# Patient Record
Sex: FEMALE | Race: WHITE | Employment: OTHER | URBAN - NONMETROPOLITAN AREA
[De-identification: names, ages, dates, MRNs, and addresses within clinical notes are randomized per-mention and may not be internally consistent; named-entity substitution may affect disease eponyms.]

---

## 2017-01-30 LAB
T4 FREE: 1.1 NG/DL (ref 0.8–1.8)
TSH SERPL DL<=0.05 MIU/L-ACNC: 0.48 UIU/ML (ref 0.4–4.4)

## 2017-03-02 DIAGNOSIS — E89.0 POSTOPERATIVE HYPOTHYROIDISM: ICD-10-CM

## 2017-03-03 RX ORDER — LEVOTHYROXINE SODIUM 0.05 MG/1
TABLET ORAL
Qty: 90 TABLET | Refills: 1 | Status: SHIPPED | OUTPATIENT
Start: 2017-03-03 | End: 2017-09-05 | Stop reason: SDUPTHER

## 2017-07-17 ENCOUNTER — HOSPITAL ENCOUNTER (OUTPATIENT)
Dept: INTERVENTIONAL RADIOLOGY/VASCULAR | Age: 62
Discharge: HOME | End: 2017-07-17
Payer: COMMERCIAL

## 2017-07-17 DIAGNOSIS — R60.9 SWELLING: ICD-10-CM

## 2017-07-17 PROCEDURE — 93971 EXTREMITY STUDY: CPT

## 2017-08-25 LAB
T4 FREE: 1.01 NG/DL (ref 0.8–1.8)
TSH SERPL DL<=0.05 MIU/L-ACNC: 0.88 UIU/ML (ref 0.4–4.4)

## 2017-09-05 ENCOUNTER — OFFICE VISIT (OUTPATIENT)
Dept: ENDOCRINOLOGY | Age: 62
End: 2017-09-05
Payer: COMMERCIAL

## 2017-09-05 VITALS
HEIGHT: 67 IN | WEIGHT: 189.5 LBS | HEART RATE: 110 BPM | DIASTOLIC BLOOD PRESSURE: 64 MMHG | RESPIRATION RATE: 20 BRPM | SYSTOLIC BLOOD PRESSURE: 118 MMHG | BODY MASS INDEX: 29.74 KG/M2

## 2017-09-05 DIAGNOSIS — E89.0 POSTOPERATIVE HYPOTHYROIDISM: Primary | ICD-10-CM

## 2017-09-05 DIAGNOSIS — E04.2 MULTINODULAR GOITER: ICD-10-CM

## 2017-09-05 PROCEDURE — 99213 OFFICE O/P EST LOW 20 MIN: CPT | Performed by: INTERNAL MEDICINE

## 2017-09-05 RX ORDER — LEVOTHYROXINE SODIUM 0.05 MG/1
TABLET ORAL
Qty: 90 TABLET | Refills: 1 | Status: SHIPPED | OUTPATIENT
Start: 2017-09-05 | End: 2018-03-09 | Stop reason: SDUPTHER

## 2018-02-03 ENCOUNTER — HOSPITAL ENCOUNTER (OUTPATIENT)
Age: 63
Discharge: HOME | End: 2018-02-03
Payer: COMMERCIAL

## 2018-02-03 LAB
ALBUMIN SERPL-MCNC: 4.6 G/DL (ref 3.5–5.1)
ALP BLD-CCNC: 83 U/L (ref 38–126)
ALT SERPL-CCNC: 9 U/L (ref 11–66)
ANION GAP SERPL CALCULATED.3IONS-SCNC: 11 MEQ/L (ref 8–16)
AST SERPL-CCNC: 10 U/L (ref 5–40)
BILIRUB SERPL-MCNC: 0.4 MG/DL (ref 0.3–1.2)
BILIRUBIN DIRECT: < 0.2 MG/DL (ref 0–0.3)
BUN BLDV-MCNC: 12 MG/DL (ref 7–22)
CALCIUM SERPL-MCNC: 9.8 MG/DL (ref 8.5–10.5)
CHLORIDE BLD-SCNC: 104 MEQ/L (ref 98–111)
CHOLESTEROL, TOTAL: 224 MG/DL (ref 100–199)
CO2: 28 MEQ/L (ref 23–33)
CREAT SERPL-MCNC: 0.6 MG/DL (ref 0.4–1.2)
GFR SERPL CREATININE-BSD FRML MDRD: > 90 ML/MIN/1.73M2
GLUCOSE BLD-MCNC: 105 MG/DL (ref 70–108)
HDLC SERPL-MCNC: 73 MG/DL
LDL CHOLESTEROL CALCULATED: 133 MG/DL
POTASSIUM SERPL-SCNC: 5.1 MEQ/L (ref 3.5–5.2)
SODIUM BLD-SCNC: 143 MEQ/L (ref 135–145)
TOTAL PROTEIN: 7 G/DL (ref 6.1–8)
TRIGL SERPL-MCNC: 89 MG/DL (ref 0–199)

## 2018-02-03 PROCEDURE — 80053 COMPREHEN METABOLIC PANEL: CPT

## 2018-02-03 PROCEDURE — 36415 COLL VENOUS BLD VENIPUNCTURE: CPT

## 2018-02-03 PROCEDURE — 82248 BILIRUBIN DIRECT: CPT

## 2018-02-03 PROCEDURE — 80061 LIPID PANEL: CPT

## 2018-03-09 DIAGNOSIS — E89.0 POSTOPERATIVE HYPOTHYROIDISM: ICD-10-CM

## 2018-03-09 RX ORDER — LEVOTHYROXINE SODIUM 0.05 MG/1
TABLET ORAL
Qty: 90 TABLET | Refills: 1 | Status: SHIPPED | OUTPATIENT
Start: 2018-03-09 | End: 2018-09-12 | Stop reason: SDUPTHER

## 2018-08-01 ENCOUNTER — HOSPITAL ENCOUNTER (OUTPATIENT)
Age: 63
Discharge: HOME | End: 2018-08-01
Payer: COMMERCIAL

## 2018-08-01 LAB
CREAT SERPL-MCNC: 0.9 MG/DL (ref 0.4–1.2)
EKG ATRIAL RATE: 74 BPM
EKG P AXIS: 2 DEGREES
EKG P-R INTERVAL: 120 MS
EKG Q-T INTERVAL: 404 MS
EKG QRS DURATION: 80 MS
EKG QTC CALCULATION (BAZETT): 448 MS
EKG R AXIS: 40 DEGREES
EKG T AXIS: 85 DEGREES
EKG VENTRICULAR RATE: 74 BPM
GFR SERPL CREATININE-BSD FRML MDRD: 64 ML/MIN/1.73M2
POTASSIUM SERPL-SCNC: 4.2 MEQ/L (ref 3.5–5.2)

## 2018-08-01 PROCEDURE — 93010 ELECTROCARDIOGRAM REPORT: CPT | Performed by: INTERNAL MEDICINE

## 2018-08-01 PROCEDURE — 84132 ASSAY OF SERUM POTASSIUM: CPT

## 2018-08-01 PROCEDURE — 82565 ASSAY OF CREATININE: CPT

## 2018-08-01 PROCEDURE — 93005 ELECTROCARDIOGRAM TRACING: CPT | Performed by: ORTHOPAEDIC SURGERY

## 2018-08-01 PROCEDURE — 36415 COLL VENOUS BLD VENIPUNCTURE: CPT

## 2018-08-31 ENCOUNTER — OFFICE VISIT (OUTPATIENT)
Dept: INTERNAL MEDICINE CLINIC | Age: 63
End: 2018-08-31
Payer: COMMERCIAL

## 2018-08-31 VITALS
SYSTOLIC BLOOD PRESSURE: 124 MMHG | RESPIRATION RATE: 14 BRPM | DIASTOLIC BLOOD PRESSURE: 82 MMHG | BODY MASS INDEX: 31.58 KG/M2 | HEART RATE: 80 BPM | WEIGHT: 201.2 LBS

## 2018-08-31 DIAGNOSIS — E04.2 MULTIPLE THYROID NODULES: ICD-10-CM

## 2018-08-31 DIAGNOSIS — E89.0 STATUS POST PARTIAL THYROIDECTOMY: ICD-10-CM

## 2018-08-31 DIAGNOSIS — E89.0 POSTOPERATIVE HYPOTHYROIDISM: Primary | ICD-10-CM

## 2018-08-31 PROCEDURE — 99204 OFFICE O/P NEW MOD 45 MIN: CPT | Performed by: INTERNAL MEDICINE

## 2018-08-31 RX ORDER — IBUPROFEN 800 MG/1
800 TABLET ORAL EVERY 6 HOURS PRN
COMMUNITY
End: 2019-08-22 | Stop reason: SDUPTHER

## 2018-08-31 RX ORDER — POTASSIUM CHLORIDE 1.5 G/1.77G
20 POWDER, FOR SOLUTION ORAL PRN
COMMUNITY
End: 2021-05-11

## 2018-08-31 RX ORDER — GABAPENTIN 100 MG/1
100 CAPSULE ORAL DAILY
COMMUNITY
End: 2021-05-11

## 2018-08-31 RX ORDER — FUROSEMIDE 40 MG/1
40 TABLET ORAL PRN
COMMUNITY
End: 2021-05-11

## 2018-08-31 ASSESSMENT — PATIENT HEALTH QUESTIONNAIRE - PHQ9
SUM OF ALL RESPONSES TO PHQ9 QUESTIONS 1 & 2: 0
2. FEELING DOWN, DEPRESSED OR HOPELESS: 0
SUM OF ALL RESPONSES TO PHQ QUESTIONS 1-9: 0
SUM OF ALL RESPONSES TO PHQ QUESTIONS 1-9: 0
1. LITTLE INTEREST OR PLEASURE IN DOING THINGS: 0

## 2018-08-31 NOTE — PROGRESS NOTES
[facility] PROFESSIONAL SERVS  PHYSICIANS [address], [address]  Dept: [phone]  Dept Fax: [phone]      Chief Complaint   Patient presents with    Hypothyroidism    Results       Patient presents for evaluation of postoperative hypothroidism. I have never seen the patient before. This patient is followed regularly by Dr. Lia Samaniego. Patient used to see Dr. Osiel Bradshaw. She had left thyroid lobectomy 2011 for thyroid nodules. She currently takes 50 µg of Synthroid daily. She said she feels good. She denies cold intolerance, weight gain, fatigue or constipation  Past Medical History:   Diagnosis Date    Hypothyroidism 9/9/2013       Current Outpatient Prescriptions   Medication Sig Dispense Refill    ibuprofen (ADVIL;MOTRIN) 800 MG tablet Take 800 mg by mouth every 6 hours as needed for Pain      gabapentin (NEURONTIN) 100 MG capsule Take 100 mg by mouth daily. Orval Opitz furosemide (LASIX) 40 MG tablet Take 40 mg by mouth as needed      potassium chloride (KLOR-CON) 20 MEQ packet Take 20 mEq by mouth as needed Along with furosemide      levothyroxine (SYNTHROID) 50 MCG tablet Take one tablet daily 90 tablet 1    escitalopram (LEXAPRO) 10 MG tablet Take 10 mg by mouth daily. No current facility-administered medications for this visit. Past Surgical History:   Procedure Laterality Date    ANKLE ARTHROSCOPY Left 05/15/2017    HYSTERECTOMY      THYROID SURGERY  1/5/2011    TONSILLECTOMY         Allergies   Allergen Reactions    Bee Venom Anaphylaxis    Tetracyclines & Related Rash    Zithromax [Azithromycin Dihydrate] Rash    Cefprozil     Doxycycline Diarrhea    Effexor [Venlafaxine] Diarrhea       Social History     Social History    Marital status:      Spouse name: N/A    Number of children: N/A    Years of education: N/A     Occupational History    Not on file.      Social History Main Topics    Smoking status: Never Smoker    Smokeless tobacco: Never Used    Alcohol use 0.0 oz/week      Comment: rarely    Drug use: No    Sexual activity: Not on file     Other Topics Concern    Not on file     Social History Narrative    No narrative on file   2 children   at detention    Family History   Problem Relation Age of Onset    Arthritis Mother     High Blood Pressure Mother     Birth Defects Father     Birth Defects Brother     Diabetes Maternal Aunt        Review of Systems - General ROS: negative  Psychological ROS: negative  Hematological and Lymphatic ROS: negative  Respiratory ROS: no cough, shortness of breath, or wheezing  Cardiovascular ROS: no chest pain or dyspnea on exertion  Gastrointestinal ROS: no abdominal pain, change in bowel habits, or black or bloody stools  Genito-Urinary ROS: no dysuria, trouble voiding, or hematuria  Musculoskeletal ROS: negative  Neurological ROS: no TIA or stroke symptoms  Dermatological ROS: negative    Blood pressure 124/82, pulse 80, resp. rate 14, weight 201 lb 3.2 oz (91.3 kg), not currently breastfeeding. Physical Examination: General appearance - alert, well appearing, and in no distress  Mental status - alert, oriented to person, place, and time  Neck - supple, no significant adenopathy, thyroid exam: thyroid is normal in size without nodules or tenderness  Chest - clear to auscultation, no wheezes, rales or rhonchi, symmetric air entry  Heart - normal rate, regular rhythm, normal S1, S2, no murmurs, rubs, clicks or gallops  Abdomen - soft, nontender, nondistended, no masses or organomegaly  Neurological - alert, oriented, normal speech, no focal findings or movement disorder noted  Musculoskeletal - no joint tenderness, deformity or swelling  Extremities - peripheral pulses normal, no pedal edema, no clubbing or cyanosis  Skin - normal coloration and turgor, no rashes, no suspicious skin lesions noted     Thyroid ultrasound (8-16): Impression       1.  Stable appearing right thyroid

## 2018-09-04 ENCOUNTER — HOSPITAL ENCOUNTER (OUTPATIENT)
Dept: ULTRASOUND IMAGING | Age: 63
Discharge: HOME | End: 2018-09-04
Payer: COMMERCIAL

## 2018-09-04 DIAGNOSIS — E04.2 MULTIPLE THYROID NODULES: ICD-10-CM

## 2018-09-04 PROCEDURE — 76536 US EXAM OF HEAD AND NECK: CPT

## 2018-09-12 DIAGNOSIS — E89.0 POSTOPERATIVE HYPOTHYROIDISM: ICD-10-CM

## 2018-09-14 ENCOUNTER — HOSPITAL ENCOUNTER (OUTPATIENT)
Dept: WOMENS IMAGING | Age: 63
Discharge: HOME | End: 2018-09-14
Payer: COMMERCIAL

## 2018-09-14 DIAGNOSIS — Z12.31 VISIT FOR SCREENING MAMMOGRAM: ICD-10-CM

## 2018-09-14 PROCEDURE — 77067 SCR MAMMO BI INCL CAD: CPT

## 2018-09-15 RX ORDER — LEVOTHYROXINE SODIUM 0.05 MG/1
TABLET ORAL
Qty: 30 TABLET | Refills: 5 | OUTPATIENT
Start: 2018-09-15 | End: 2019-02-05 | Stop reason: SDUPTHER

## 2019-01-26 ENCOUNTER — HOSPITAL ENCOUNTER (OUTPATIENT)
Age: 64
Discharge: HOME | End: 2019-01-26
Payer: COMMERCIAL

## 2019-01-26 LAB
ALBUMIN SERPL-MCNC: 4.7 G/DL (ref 3.5–5.1)
ALP BLD-CCNC: 92 U/L (ref 38–126)
ALT SERPL-CCNC: 10 U/L (ref 11–66)
ANION GAP SERPL CALCULATED.3IONS-SCNC: 12 MEQ/L (ref 8–16)
AST SERPL-CCNC: 11 U/L (ref 5–40)
BASOPHILS # BLD: 0.6 %
BASOPHILS ABSOLUTE: 0 THOU/MM3 (ref 0–0.1)
BILIRUB SERPL-MCNC: 0.4 MG/DL (ref 0.3–1.2)
BILIRUBIN DIRECT: < 0.2 MG/DL (ref 0–0.3)
BUN BLDV-MCNC: 16 MG/DL (ref 7–22)
CALCIUM SERPL-MCNC: 9.5 MG/DL (ref 8.5–10.5)
CHLORIDE BLD-SCNC: 110 MEQ/L (ref 98–111)
CHOLESTEROL, TOTAL: 212 MG/DL (ref 100–199)
CO2: 23 MEQ/L (ref 23–33)
CREAT SERPL-MCNC: 0.6 MG/DL (ref 0.4–1.2)
EOSINOPHIL # BLD: 2 %
EOSINOPHILS ABSOLUTE: 0.1 THOU/MM3 (ref 0–0.4)
ERYTHROCYTE [DISTWIDTH] IN BLOOD BY AUTOMATED COUNT: 13.2 % (ref 11.5–14.5)
ERYTHROCYTE [DISTWIDTH] IN BLOOD BY AUTOMATED COUNT: 44.1 FL (ref 35–45)
GFR SERPL CREATININE-BSD FRML MDRD: > 90 ML/MIN/1.73M2
GLUCOSE BLD-MCNC: 108 MG/DL (ref 70–108)
HCT VFR BLD CALC: 42.9 % (ref 37–47)
HDLC SERPL-MCNC: 63 MG/DL
HEMOGLOBIN: 13.1 GM/DL (ref 12–16)
IMMATURE GRANS (ABS): 0.01 THOU/MM3 (ref 0–0.07)
IMMATURE GRANULOCYTES: 0.2 %
LDL CHOLESTEROL CALCULATED: 132 MG/DL
LYMPHOCYTES # BLD: 22.8 %
LYMPHOCYTES ABSOLUTE: 1.1 THOU/MM3 (ref 1–4.8)
MCH RBC QN AUTO: 27.8 PG (ref 26–33)
MCHC RBC AUTO-ENTMCNC: 30.5 GM/DL (ref 32.2–35.5)
MCV RBC AUTO: 91.1 FL (ref 81–99)
MONOCYTES # BLD: 7 %
MONOCYTES ABSOLUTE: 0.4 THOU/MM3 (ref 0.4–1.3)
NUCLEATED RED BLOOD CELLS: 0 /100 WBC
PLATELET # BLD: 211 THOU/MM3 (ref 130–400)
PMV BLD AUTO: 11.2 FL (ref 9.4–12.4)
POTASSIUM SERPL-SCNC: 4.8 MEQ/L (ref 3.5–5.2)
RBC # BLD: 4.71 MILL/MM3 (ref 4.2–5.4)
SEG NEUTROPHILS: 67.4 %
SEGMENTED NEUTROPHILS ABSOLUTE COUNT: 3.4 THOU/MM3 (ref 1.8–7.7)
SODIUM BLD-SCNC: 145 MEQ/L (ref 135–145)
TOTAL PROTEIN: 6.7 G/DL (ref 6.1–8)
TRIGL SERPL-MCNC: 84 MG/DL (ref 0–199)
TSH SERPL DL<=0.05 MIU/L-ACNC: 0.71 UIU/ML (ref 0.4–4.2)
WBC # BLD: 5 THOU/MM3 (ref 4.8–10.8)

## 2019-01-26 PROCEDURE — 80061 LIPID PANEL: CPT

## 2019-01-26 PROCEDURE — 84443 ASSAY THYROID STIM HORMONE: CPT

## 2019-01-26 PROCEDURE — 80053 COMPREHEN METABOLIC PANEL: CPT

## 2019-01-26 PROCEDURE — 36415 COLL VENOUS BLD VENIPUNCTURE: CPT

## 2019-01-26 PROCEDURE — 85025 COMPLETE CBC W/AUTO DIFF WBC: CPT

## 2019-01-26 PROCEDURE — 82248 BILIRUBIN DIRECT: CPT

## 2019-02-05 DIAGNOSIS — E89.0 POSTOPERATIVE HYPOTHYROIDISM: ICD-10-CM

## 2019-02-07 RX ORDER — LEVOTHYROXINE SODIUM 0.05 MG/1
TABLET ORAL
Qty: 90 TABLET | Refills: 3 | Status: SHIPPED | OUTPATIENT
Start: 2019-02-07 | End: 2022-02-14

## 2019-08-22 ENCOUNTER — APPOINTMENT (OUTPATIENT)
Dept: GENERAL RADIOLOGY | Age: 64
End: 2019-08-22
Payer: COMMERCIAL

## 2019-08-22 ENCOUNTER — HOSPITAL ENCOUNTER (EMERGENCY)
Age: 64
Discharge: HOME | End: 2019-08-22
Payer: COMMERCIAL

## 2019-08-22 VITALS
SYSTOLIC BLOOD PRESSURE: 185 MMHG | WEIGHT: 195 LBS | OXYGEN SATURATION: 98 % | TEMPERATURE: 98.2 F | HEART RATE: 83 BPM | BODY MASS INDEX: 30.61 KG/M2 | RESPIRATION RATE: 17 BRPM | DIASTOLIC BLOOD PRESSURE: 90 MMHG

## 2019-08-22 DIAGNOSIS — S46.912A STRAIN OF LEFT SHOULDER, INITIAL ENCOUNTER: Primary | ICD-10-CM

## 2019-08-22 DIAGNOSIS — R03.0 ELEVATED BLOOD PRESSURE READING: ICD-10-CM

## 2019-08-22 PROCEDURE — 2709999900 HC NON-CHARGEABLE SUPPLY

## 2019-08-22 PROCEDURE — 99283 EMERGENCY DEPT VISIT LOW MDM: CPT

## 2019-08-22 PROCEDURE — 73030 X-RAY EXAM OF SHOULDER: CPT

## 2019-08-22 RX ORDER — IBUPROFEN 800 MG/1
800 TABLET ORAL EVERY 6 HOURS PRN
Qty: 30 TABLET | Refills: 0 | Status: SHIPPED | OUTPATIENT
Start: 2019-08-22 | End: 2019-09-05 | Stop reason: ALTCHOICE

## 2019-08-22 RX ORDER — CYCLOBENZAPRINE HCL 10 MG
10 TABLET ORAL 3 TIMES DAILY PRN
Qty: 21 TABLET | Refills: 0 | Status: SHIPPED | OUTPATIENT
Start: 2019-08-22 | End: 2019-09-01

## 2019-08-22 ASSESSMENT — PAIN DESCRIPTION - ORIENTATION: ORIENTATION: LEFT

## 2019-08-22 ASSESSMENT — ENCOUNTER SYMPTOMS
COUGH: 0
SHORTNESS OF BREATH: 0
ABDOMINAL PAIN: 0

## 2019-08-22 ASSESSMENT — PAIN SCALES - GENERAL: PAINLEVEL_OUTOF10: 3

## 2019-08-22 ASSESSMENT — PAIN DESCRIPTION - PAIN TYPE: TYPE: ACUTE PAIN

## 2019-08-22 ASSESSMENT — PAIN DESCRIPTION - DESCRIPTORS: DESCRIPTORS: ACHING

## 2019-08-22 ASSESSMENT — PAIN DESCRIPTION - LOCATION: LOCATION: SHOULDER

## 2019-08-22 NOTE — ED PROVIDER NOTES
[facility]    CONSULTS:  None    PROCEDURES:  None    FINAL IMPRESSION      1. Strain of left shoulder, initial encounter    2. Elevated blood pressure reading          DISPOSITION/PLAN   Discharge    PATIENT REFERRED TO:  Juan Zhang MD  [address] [place] [id number]  [phone]          [address] [address]  [phone]  In 1 day  appointment at [address] [place] [address]:  Discharge Medication List as of 8/22/2019 12:26 PM      START taking these medications    Details   cyclobenzaprine (FLEXERIL) 10 MG tablet Take 1 tablet by mouth 3 times daily as needed for Muscle spasms, Disp-21 tablet, R-0Print             (Please note that portions of this note were completed with a voice recognition program.  Efforts were made to edit the dictations but occasionally words are mis-transcribed.)    The patient was given an opportunity to see the Emergency Attending. The patient voiced understanding that I was a Mid-LevelProvider and was in agreement with being seen independently by myself.           JANEY Manzanares CNP  08/22/19 9453

## 2019-09-04 ENCOUNTER — HOSPITAL ENCOUNTER (OUTPATIENT)
Dept: MRI IMAGING | Age: 64
Discharge: HOME | End: 2019-09-04
Payer: COMMERCIAL

## 2019-09-04 DIAGNOSIS — S46.012A ROTATOR CUFF STRAIN, LEFT, INITIAL ENCOUNTER: ICD-10-CM

## 2019-09-04 PROCEDURE — 73221 MRI JOINT UPR EXTREM W/O DYE: CPT

## 2019-09-05 ENCOUNTER — OFFICE VISIT (OUTPATIENT)
Dept: INTERNAL MEDICINE CLINIC | Age: 64
End: 2019-09-05
Payer: COMMERCIAL

## 2019-09-05 VITALS
DIASTOLIC BLOOD PRESSURE: 63 MMHG | WEIGHT: 195.5 LBS | SYSTOLIC BLOOD PRESSURE: 142 MMHG | HEART RATE: 89 BPM | RESPIRATION RATE: 14 BRPM | HEIGHT: 67 IN | BODY MASS INDEX: 30.69 KG/M2

## 2019-09-05 DIAGNOSIS — E89.0 POSTOPERATIVE HYPOTHYROIDISM: ICD-10-CM

## 2019-09-05 DIAGNOSIS — E04.2 MULTIPLE THYROID NODULES: ICD-10-CM

## 2019-09-05 DIAGNOSIS — E03.9 HYPOTHYROIDISM, UNSPECIFIED TYPE: Primary | ICD-10-CM

## 2019-09-05 DIAGNOSIS — E89.0 STATUS POST PARTIAL THYROIDECTOMY: ICD-10-CM

## 2019-09-05 PROCEDURE — 99214 OFFICE O/P EST MOD 30 MIN: CPT | Performed by: INTERNAL MEDICINE

## 2019-09-05 RX ORDER — METHYLPREDNISOLONE 4 MG/1
4 TABLET ORAL DAILY
COMMUNITY
End: 2021-05-11

## 2019-09-05 ASSESSMENT — PATIENT HEALTH QUESTIONNAIRE - PHQ9
2. FEELING DOWN, DEPRESSED OR HOPELESS: 0
SUM OF ALL RESPONSES TO PHQ QUESTIONS 1-9: 0
SUM OF ALL RESPONSES TO PHQ9 QUESTIONS 1 & 2: 0
SUM OF ALL RESPONSES TO PHQ QUESTIONS 1-9: 0
1. LITTLE INTEREST OR PLEASURE IN DOING THINGS: 0

## 2019-09-05 NOTE — PROGRESS NOTES
not currently breastfeeding. Physical Examination: General appearance - alert, well appearing, and in no distress  Mental status - alert, oriented to person, place, and time  Neck - supple, no significant adenopathy, thyroid exam: thyroid is normal in size without nodules or tenderness  Chest - clear to auscultation, no wheezes, rales or rhonchi, symmetric air entry  Heart - normal rate, regular rhythm, normal S1, S2, no murmurs, rubs, clicks or gallops  Abdomen - soft, nontender, nondistended, no masses or organomegaly  Neurological - alert, oriented, normal speech, no focal findings or movement disorder noted  Musculoskeletal - no joint tenderness, deformity or swelling  Extremities - peripheral pulses normal, no pedal edema, no clubbing or cyanosis  Skin - normal coloration and turgor, no rashes, no suspicious skin lesions noted     Thyroid ultrasound 9/2018     Impression   1. Left thyroidectomy. 2. There is no residual thyroid tissue or mass within the left thyroid bed. 3. There is stable right thyromegaly and a stable sonographic findings consistent with a multinodular goiter. 4. There are no pathologically enlarged lymph nodes adjacent to the left thyroid bed or the right lobe/isthmus.              Diagnosis Orders   1. Hypothyroidism, unspecified type  TSH With Reflex Ft4    US THYROID   2. Postoperative hypothyroidism     3. Multiple thyroid nodules     4. Status post partial thyroidectomy         Orders Placed This Encounter   Procedures    US THYROID     Standing Status:   Future     Standing Expiration Date:   9/5/2020    TSH With Reflex Ft4     Standing Status:   Future     Standing Expiration Date:   9/5/2020     Patient has postoperative hypothyroidism. TSH normal.  In January  She feels well. We'll schedule thyroid ultrasound to follow-up on the nodules on the right.   I told her I would see her on a yearly basis

## 2021-05-11 PROBLEM — F41.9 ANXIETY: Status: ACTIVE | Noted: 2021-05-11

## 2021-05-11 PROBLEM — F33.9 EPISODE OF RECURRENT MAJOR DEPRESSIVE DISORDER (HCC): Status: ACTIVE | Noted: 2021-05-11

## 2021-07-09 PROBLEM — G90.522: Status: ACTIVE | Noted: 2021-07-09

## 2022-01-14 ENCOUNTER — HOSPITAL ENCOUNTER (EMERGENCY)
Age: 67
Discharge: HOME | End: 2022-01-14
Payer: COMMERCIAL

## 2022-01-14 ENCOUNTER — APPOINTMENT (OUTPATIENT)
Dept: GENERAL RADIOLOGY | Age: 67
End: 2022-01-14
Payer: COMMERCIAL

## 2022-01-14 VITALS
DIASTOLIC BLOOD PRESSURE: 66 MMHG | OXYGEN SATURATION: 98 % | HEIGHT: 67 IN | HEART RATE: 84 BPM | RESPIRATION RATE: 19 BRPM | SYSTOLIC BLOOD PRESSURE: 138 MMHG | BODY MASS INDEX: 31.39 KG/M2 | WEIGHT: 200 LBS | TEMPERATURE: 97.9 F

## 2022-01-14 DIAGNOSIS — S90.30XA CONTUSION OF DORSUM OF FOOT: Primary | ICD-10-CM

## 2022-01-14 PROCEDURE — 99213 OFFICE O/P EST LOW 20 MIN: CPT | Performed by: NURSE PRACTITIONER

## 2022-01-14 PROCEDURE — 73630 X-RAY EXAM OF FOOT: CPT

## 2022-01-14 PROCEDURE — 99213 OFFICE O/P EST LOW 20 MIN: CPT

## 2022-01-14 ASSESSMENT — ENCOUNTER SYMPTOMS
SHORTNESS OF BREATH: 0
VOMITING: 0
NAUSEA: 0
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
## 2022-02-08 PROBLEM — M17.12 ARTHRITIS OF LEFT KNEE: Status: ACTIVE | Noted: 2022-02-08

## 2022-02-25 PROBLEM — M76.829 TIBIALIS TENDINITIS: Status: ACTIVE | Noted: 2022-02-25

## 2022-03-04 ENCOUNTER — HOSPITAL ENCOUNTER (OUTPATIENT)
Age: 67
Discharge: HOME | End: 2022-03-04
Payer: COMMERCIAL

## 2022-03-04 DIAGNOSIS — Z00.00 ENCOUNTER FOR WELL ADULT EXAM WITHOUT ABNORMAL FINDINGS: ICD-10-CM

## 2022-03-04 DIAGNOSIS — Z00.00 WELLNESS EXAMINATION: ICD-10-CM

## 2022-03-04 DIAGNOSIS — Z11.59 NEED FOR HEPATITIS C SCREENING TEST: ICD-10-CM

## 2022-03-04 DIAGNOSIS — I10 HYPERTENSION, UNSPECIFIED TYPE: ICD-10-CM

## 2022-03-04 DIAGNOSIS — E89.0 POSTOPERATIVE HYPOTHYROIDISM: ICD-10-CM

## 2022-03-04 LAB
ALBUMIN SERPL-MCNC: 4.3 G/DL (ref 3.5–5.1)
ALP BLD-CCNC: 94 U/L (ref 38–126)
ALT SERPL-CCNC: 12 U/L (ref 11–66)
ANION GAP SERPL CALCULATED.3IONS-SCNC: 9 MEQ/L (ref 8–16)
AST SERPL-CCNC: 16 U/L (ref 5–40)
BASOPHILS # BLD: 0.7 %
BASOPHILS ABSOLUTE: 0 THOU/MM3 (ref 0–0.1)
BILIRUB SERPL-MCNC: 0.3 MG/DL (ref 0.3–1.2)
BUN BLDV-MCNC: 19 MG/DL (ref 7–22)
CALCIUM SERPL-MCNC: 9.5 MG/DL (ref 8.5–10.5)
CHLORIDE BLD-SCNC: 106 MEQ/L (ref 98–111)
CHOLESTEROL, TOTAL: 202 MG/DL (ref 100–199)
CO2: 27 MEQ/L (ref 23–33)
CREAT SERPL-MCNC: 0.8 MG/DL (ref 0.4–1.2)
EOSINOPHIL # BLD: 2.3 %
EOSINOPHILS ABSOLUTE: 0.1 THOU/MM3 (ref 0–0.4)
ERYTHROCYTE [DISTWIDTH] IN BLOOD BY AUTOMATED COUNT: 13.6 % (ref 11.5–14.5)
ERYTHROCYTE [DISTWIDTH] IN BLOOD BY AUTOMATED COUNT: 42.7 FL (ref 35–45)
GFR SERPL CREATININE-BSD FRML MDRD: 72 ML/MIN/1.73M2
GLUCOSE BLD-MCNC: 91 MG/DL (ref 70–108)
HCT VFR BLD CALC: 33 % (ref 37–47)
HDLC SERPL-MCNC: 76 MG/DL
HEMOGLOBIN: 9.7 GM/DL (ref 12–16)
HEPATITIS C ANTIBODY: NEGATIVE
IMMATURE GRANS (ABS): 0.01 THOU/MM3 (ref 0–0.07)
IMMATURE GRANULOCYTES: 0.2 %
LDL CHOLESTEROL CALCULATED: 112 MG/DL
LYMPHOCYTES # BLD: 24.9 %
LYMPHOCYTES ABSOLUTE: 1.4 THOU/MM3 (ref 1–4.8)
MCH RBC QN AUTO: 25.3 PG (ref 26–33)
MCHC RBC AUTO-ENTMCNC: 29.4 GM/DL (ref 32.2–35.5)
MCV RBC AUTO: 85.9 FL (ref 81–99)
MONOCYTES # BLD: 7.9 %
MONOCYTES ABSOLUTE: 0.5 THOU/MM3 (ref 0.4–1.3)
NUCLEATED RED BLOOD CELLS: 0 /100 WBC
PLATELET # BLD: 273 THOU/MM3 (ref 130–400)
PMV BLD AUTO: 11.2 FL (ref 9.4–12.4)
POTASSIUM SERPL-SCNC: 5 MEQ/L (ref 3.5–5.2)
RBC # BLD: 3.84 MILL/MM3 (ref 4.2–5.4)
SEG NEUTROPHILS: 64 %
SEGMENTED NEUTROPHILS ABSOLUTE COUNT: 3.6 THOU/MM3 (ref 1.8–7.7)
SODIUM BLD-SCNC: 142 MEQ/L (ref 135–145)
TOTAL PROTEIN: 6.5 G/DL (ref 6.1–8)
TRIGL SERPL-MCNC: 72 MG/DL (ref 0–199)
TSH SERPL DL<=0.05 MIU/L-ACNC: 0.85 UIU/ML (ref 0.4–4.2)
WBC # BLD: 5.7 THOU/MM3 (ref 4.8–10.8)

## 2022-03-04 PROCEDURE — 84443 ASSAY THYROID STIM HORMONE: CPT

## 2022-03-04 PROCEDURE — 86803 HEPATITIS C AB TEST: CPT

## 2022-03-04 PROCEDURE — 85025 COMPLETE CBC W/AUTO DIFF WBC: CPT

## 2022-03-04 PROCEDURE — 36415 COLL VENOUS BLD VENIPUNCTURE: CPT

## 2022-03-04 PROCEDURE — 80061 LIPID PANEL: CPT

## 2022-03-04 PROCEDURE — 80053 COMPREHEN METABOLIC PANEL: CPT

## 2022-03-08 ENCOUNTER — HOSPITAL ENCOUNTER (OUTPATIENT)
Age: 67
Discharge: HOME | End: 2022-03-08
Payer: COMMERCIAL

## 2022-03-08 DIAGNOSIS — D64.9 ANEMIA, UNSPECIFIED TYPE: ICD-10-CM

## 2022-03-08 PROCEDURE — 36415 COLL VENOUS BLD VENIPUNCTURE: CPT

## 2022-03-08 PROCEDURE — 83550 IRON BINDING TEST: CPT

## 2022-03-08 PROCEDURE — 82746 ASSAY OF FOLIC ACID SERUM: CPT

## 2022-03-08 PROCEDURE — 82607 VITAMIN B-12: CPT

## 2022-03-08 PROCEDURE — 83540 ASSAY OF IRON: CPT

## 2022-03-09 LAB
FOLATE: 10.7 NG/ML (ref 4.8–24.2)
IRON: 47 UG/DL (ref 50–170)
TOTAL IRON BINDING CAPACITY: 356 UG/DL (ref 171–450)
VITAMIN B-12: 571 PG/ML (ref 211–911)

## 2022-04-08 ENCOUNTER — HOSPITAL ENCOUNTER (OUTPATIENT)
Age: 67
Discharge: HOME | End: 2022-04-08
Payer: COMMERCIAL

## 2022-04-08 ENCOUNTER — HOSPITAL ENCOUNTER (OUTPATIENT)
Dept: GENERAL RADIOLOGY | Age: 67
Discharge: HOME | End: 2022-04-08
Payer: COMMERCIAL

## 2022-04-08 DIAGNOSIS — M79.671 RIGHT FOOT PAIN: ICD-10-CM

## 2022-04-08 LAB
ANION GAP SERPL CALCULATED.3IONS-SCNC: 9 MEQ/L (ref 8–16)
ANISOCYTOSIS: PRESENT
BASOPHILS # BLD: 0.7 %
BASOPHILS ABSOLUTE: 0 THOU/MM3 (ref 0–0.1)
BUN BLDV-MCNC: 24 MG/DL (ref 7–22)
C-REACTIVE PROTEIN: < 0.3 MG/DL (ref 0–1)
CALCIUM SERPL-MCNC: 9.6 MG/DL (ref 8.5–10.5)
CHLORIDE BLD-SCNC: 104 MEQ/L (ref 98–111)
CO2: 26 MEQ/L (ref 23–33)
CREAT SERPL-MCNC: 0.8 MG/DL (ref 0.4–1.2)
EKG ATRIAL RATE: 70 BPM
EKG P AXIS: 70 DEGREES
EKG P-R INTERVAL: 134 MS
EKG Q-T INTERVAL: 406 MS
EKG QRS DURATION: 82 MS
EKG QTC CALCULATION (BAZETT): 438 MS
EKG R AXIS: 64 DEGREES
EKG T AXIS: 78 DEGREES
EKG VENTRICULAR RATE: 70 BPM
EOSINOPHIL # BLD: 1.7 %
EOSINOPHILS ABSOLUTE: 0.1 THOU/MM3 (ref 0–0.4)
ERYTHROCYTE [DISTWIDTH] IN BLOOD BY AUTOMATED COUNT: 14 % (ref 11.5–14.5)
ERYTHROCYTE [DISTWIDTH] IN BLOOD BY AUTOMATED COUNT: 42 FL (ref 35–45)
GFR SERPL CREATININE-BSD FRML MDRD: 72 ML/MIN/1.73M2
GLUCOSE BLD-MCNC: 99 MG/DL (ref 70–108)
HCT VFR BLD CALC: 29.6 % (ref 37–47)
HEMOGLOBIN: 8.4 GM/DL (ref 12–16)
HYPOCHROMIA: PRESENT
IMMATURE GRANS (ABS): 0.02 THOU/MM3 (ref 0–0.07)
IMMATURE GRANULOCYTES: 0.4 %
LYMPHOCYTES # BLD: 17.5 %
LYMPHOCYTES ABSOLUTE: 0.9 THOU/MM3 (ref 1–4.8)
MCH RBC QN AUTO: 23.2 PG (ref 26–33)
MCHC RBC AUTO-ENTMCNC: 28.4 GM/DL (ref 32.2–35.5)
MCV RBC AUTO: 81.8 FL (ref 81–99)
MONOCYTES # BLD: 7.3 %
MONOCYTES ABSOLUTE: 0.4 THOU/MM3 (ref 0.4–1.3)
MRSA SCREEN RT-PCR: NEGATIVE
NUCLEATED RED BLOOD CELLS: 0 /100 WBC
PLATELET # BLD: 252 THOU/MM3 (ref 130–400)
PMV BLD AUTO: 10.7 FL (ref 9.4–12.4)
POTASSIUM SERPL-SCNC: 4.6 MEQ/L (ref 3.5–5.2)
RBC # BLD: 3.62 MILL/MM3 (ref 4.2–5.4)
SCAN OF BLOOD SMEAR: NORMAL
SEDIMENTATION RATE, ERYTHROCYTE: 11 MM/HR (ref 0–20)
SEG NEUTROPHILS: 72.4 %
SEGMENTED NEUTROPHILS ABSOLUTE COUNT: 3.9 THOU/MM3 (ref 1.8–7.7)
SODIUM BLD-SCNC: 139 MEQ/L (ref 135–145)
WBC # BLD: 5.4 THOU/MM3 (ref 4.8–10.8)

## 2022-04-08 PROCEDURE — 85651 RBC SED RATE NONAUTOMATED: CPT

## 2022-04-08 PROCEDURE — 71046 X-RAY EXAM CHEST 2 VIEWS: CPT

## 2022-04-08 PROCEDURE — 93010 ELECTROCARDIOGRAM REPORT: CPT | Performed by: INTERNAL MEDICINE

## 2022-04-08 PROCEDURE — 87641 MR-STAPH DNA AMP PROBE: CPT

## 2022-04-08 PROCEDURE — 36415 COLL VENOUS BLD VENIPUNCTURE: CPT

## 2022-04-08 PROCEDURE — 86140 C-REACTIVE PROTEIN: CPT

## 2022-04-08 PROCEDURE — 99211 OFF/OP EST MAY X REQ PHY/QHP: CPT

## 2022-04-08 PROCEDURE — 93005 ELECTROCARDIOGRAM TRACING: CPT | Performed by: ORTHOPAEDIC SURGERY

## 2022-04-08 PROCEDURE — 85025 COMPLETE CBC W/AUTO DIFF WBC: CPT

## 2022-04-08 PROCEDURE — 80048 BASIC METABOLIC PNL TOTAL CA: CPT

## 2022-04-18 ENCOUNTER — HOSPITAL ENCOUNTER (OUTPATIENT)
Age: 67
Discharge: HOME | End: 2022-04-18
Payer: COMMERCIAL

## 2022-04-18 DIAGNOSIS — D64.9 ANEMIA, UNSPECIFIED TYPE: ICD-10-CM

## 2022-04-18 LAB
IRON: 28 UG/DL (ref 50–170)
TOTAL IRON BINDING CAPACITY: 384 UG/DL (ref 171–450)

## 2022-04-18 PROCEDURE — 83550 IRON BINDING TEST: CPT

## 2022-04-18 PROCEDURE — 36415 COLL VENOUS BLD VENIPUNCTURE: CPT

## 2022-04-18 PROCEDURE — 83540 ASSAY OF IRON: CPT

## 2022-05-11 ENCOUNTER — HOSPITAL ENCOUNTER (OUTPATIENT)
Age: 67
Setting detail: SPECIMEN
Discharge: HOME | End: 2022-05-11
Payer: COMMERCIAL

## 2022-05-11 LAB — HEMOCCULT STL QL: NEGATIVE

## 2022-05-11 PROCEDURE — 82272 OCCULT BLD FECES 1-3 TESTS: CPT

## 2022-06-28 ENCOUNTER — HOSPITAL ENCOUNTER (OUTPATIENT)
Age: 67
Discharge: HOME | End: 2022-06-28

## 2022-06-28 DIAGNOSIS — D64.9 ANEMIA, UNSPECIFIED TYPE: ICD-10-CM

## 2022-06-29 ENCOUNTER — HOSPITAL ENCOUNTER (OUTPATIENT)
Age: 67
Discharge: HOME | End: 2022-06-29
Payer: COMMERCIAL

## 2022-06-29 PROCEDURE — 83540 ASSAY OF IRON: CPT

## 2022-06-29 PROCEDURE — 36415 COLL VENOUS BLD VENIPUNCTURE: CPT

## 2022-06-29 PROCEDURE — 85025 COMPLETE CBC W/AUTO DIFF WBC: CPT

## 2022-06-29 PROCEDURE — 83550 IRON BINDING TEST: CPT

## 2022-06-30 ENCOUNTER — HOSPITAL ENCOUNTER (EMERGENCY)
Age: 67
Discharge: HOME | End: 2022-06-30
Payer: COMMERCIAL

## 2022-06-30 VITALS
HEART RATE: 70 BPM | RESPIRATION RATE: 16 BRPM | WEIGHT: 200 LBS | DIASTOLIC BLOOD PRESSURE: 56 MMHG | TEMPERATURE: 98.1 F | SYSTOLIC BLOOD PRESSURE: 102 MMHG | OXYGEN SATURATION: 100 % | BODY MASS INDEX: 31.39 KG/M2 | HEIGHT: 67 IN

## 2022-06-30 DIAGNOSIS — D50.9 IRON DEFICIENCY ANEMIA, UNSPECIFIED IRON DEFICIENCY ANEMIA TYPE: Primary | ICD-10-CM

## 2022-06-30 LAB
ABO: NORMAL
ALBUMIN SERPL-MCNC: 4.2 G/DL (ref 3.5–5.1)
ALP BLD-CCNC: 95 U/L (ref 38–126)
ALT SERPL-CCNC: 13 U/L (ref 11–66)
ANION GAP SERPL CALCULATED.3IONS-SCNC: 10 MEQ/L (ref 8–16)
ANTIBODY SCREEN: NORMAL
APTT: 27.7 SECONDS (ref 22–38)
AST SERPL-CCNC: 17 U/L (ref 5–40)
BASOPHILIA: ABNORMAL
BASOPHILS # BLD: 0.5 %
BASOPHILS # BLD: 0.8 %
BASOPHILS ABSOLUTE: 0 THOU/MM3 (ref 0–0.1)
BASOPHILS ABSOLUTE: 0 THOU/MM3 (ref 0–0.1)
BILIRUB SERPL-MCNC: 0.2 MG/DL (ref 0.3–1.2)
BUN BLDV-MCNC: 21 MG/DL (ref 7–22)
CALCIUM SERPL-MCNC: 9 MG/DL (ref 8.5–10.5)
CHLORIDE BLD-SCNC: 104 MEQ/L (ref 98–111)
CO2: 25 MEQ/L (ref 23–33)
CREAT SERPL-MCNC: 0.7 MG/DL (ref 0.4–1.2)
DIFFERENTIAL TYPE: ABNORMAL
EKG ATRIAL RATE: 73 BPM
EKG P AXIS: -4 DEGREES
EKG P-R INTERVAL: 128 MS
EKG Q-T INTERVAL: 424 MS
EKG QRS DURATION: 78 MS
EKG QTC CALCULATION (BAZETT): 467 MS
EKG R AXIS: 22 DEGREES
EKG T AXIS: 31 DEGREES
EKG VENTRICULAR RATE: 73 BPM
ELLIPTOCYTES: ABNORMAL
ELLIPTOCYTES: ABNORMAL
EOSINOPHIL # BLD: 2.1 %
EOSINOPHIL # BLD: 2.4 %
EOSINOPHILS ABSOLUTE: 0.1 THOU/MM3 (ref 0–0.4)
EOSINOPHILS ABSOLUTE: 0.1 THOU/MM3 (ref 0–0.4)
ERYTHROCYTE [DISTWIDTH] IN BLOOD BY AUTOMATED COUNT: 19.1 % (ref 11.5–14.5)
ERYTHROCYTE [DISTWIDTH] IN BLOOD BY AUTOMATED COUNT: 19.1 % (ref 11.5–14.5)
ERYTHROCYTE [DISTWIDTH] IN BLOOD BY AUTOMATED COUNT: 58.2 FL (ref 35–45)
ERYTHROCYTE [DISTWIDTH] IN BLOOD BY AUTOMATED COUNT: 58.8 FL (ref 35–45)
FOLATE: 13.8 NG/ML (ref 4.8–24.2)
GFR SERPL CREATININE-BSD FRML MDRD: 84 ML/MIN/1.73M2
GLUCOSE BLD-MCNC: 96 MG/DL (ref 70–108)
HCT VFR BLD CALC: 25.3 % (ref 37–47)
HCT VFR BLD CALC: 25.6 % (ref 37–47)
HCT VFR BLD CALC: 25.6 % (ref 37–47)
HEMOCCULT STL QL: NEGATIVE
HEMOGLOBIN: 7 GM/DL (ref 12–16)
HEMOGLOBIN: 7.2 GM/DL (ref 12–16)
HEMOGLOBIN: 7.2 GM/DL (ref 12–16)
HYPOCHROMIA: PRESENT
HYPOCHROMIA: PRESENT
IMMATURE GRANS (ABS): 0.01 THOU/MM3 (ref 0–0.07)
IMMATURE GRANS (ABS): 0.02 THOU/MM3 (ref 0–0.07)
IMMATURE GRANULOCYTES: 0.2 %
IMMATURE GRANULOCYTES: 0.5 %
INR BLD: 0.98 (ref 0.85–1.13)
IRON SATURATION: 6 % (ref 20–50)
IRON: 23 UG/DL (ref 50–170)
IRON: 34 UG/DL (ref 50–170)
LYMPHOCYTES # BLD: 23.6 %
LYMPHOCYTES # BLD: 26.5 %
LYMPHOCYTES ABSOLUTE: 1 THOU/MM3 (ref 1–4.8)
LYMPHOCYTES ABSOLUTE: 1.3 THOU/MM3 (ref 1–4.8)
MCH RBC QN AUTO: 23 PG (ref 26–33)
MCH RBC QN AUTO: 23.5 PG (ref 26–33)
MCHC RBC AUTO-ENTMCNC: 27.3 GM/DL (ref 32.2–35.5)
MCHC RBC AUTO-ENTMCNC: 28.1 GM/DL (ref 32.2–35.5)
MCV RBC AUTO: 83.4 FL (ref 81–99)
MCV RBC AUTO: 84.2 FL (ref 81–99)
MONOCYTES # BLD: 7.8 %
MONOCYTES # BLD: 8 %
MONOCYTES ABSOLUTE: 0.3 THOU/MM3 (ref 0.4–1.3)
MONOCYTES ABSOLUTE: 0.4 THOU/MM3 (ref 0.4–1.3)
NUCLEATED RED BLOOD CELLS: 0 /100 WBC
NUCLEATED RED BLOOD CELLS: 0 /100 WBC
OSMOLALITY CALCULATION: 280.4 MOSMOL/KG (ref 275–300)
PATHOLOGIST REVIEW: ABNORMAL
PLATELET # BLD: 246 THOU/MM3 (ref 130–400)
PLATELET # BLD: 259 THOU/MM3 (ref 130–400)
PLATELET ESTIMATE: ADEQUATE
PMV BLD AUTO: 10.6 FL (ref 9.4–12.4)
PMV BLD AUTO: 10.8 FL (ref 9.4–12.4)
POIKILOCYTES: ABNORMAL
POTASSIUM REFLEX MAGNESIUM: 4 MEQ/L (ref 3.5–5.2)
RBC # BLD: 3.04 MILL/MM3 (ref 4.2–5.4)
RBC # BLD: 3.07 MILL/MM3 (ref 4.2–5.4)
RH FACTOR: NORMAL
SCAN OF BLOOD SMEAR: NORMAL
SCAN OF BLOOD SMEAR: NORMAL
SEG NEUTROPHILS: 62.4 %
SEG NEUTROPHILS: 65.2 %
SEGMENTED NEUTROPHILS ABSOLUTE COUNT: 2.7 THOU/MM3 (ref 1.8–7.7)
SEGMENTED NEUTROPHILS ABSOLUTE COUNT: 3.1 THOU/MM3 (ref 1.8–7.7)
SODIUM BLD-SCNC: 139 MEQ/L (ref 135–145)
TOTAL IRON BINDING CAPACITY: 359 UG/DL (ref 171–450)
TOTAL IRON BINDING CAPACITY: 367 UG/DL (ref 171–450)
TOTAL PROTEIN: 6.2 G/DL (ref 6.1–8)
TROPONIN T: < 0.01 NG/ML
TSH SERPL DL<=0.05 MIU/L-ACNC: 1.27 UIU/ML (ref 0.4–4.2)
VITAMIN B-12: 524 PG/ML (ref 211–911)
WBC # BLD: 4.2 THOU/MM3 (ref 4.8–10.8)
WBC # BLD: 4.9 THOU/MM3 (ref 4.8–10.8)

## 2022-06-30 PROCEDURE — 85025 COMPLETE CBC W/AUTO DIFF WBC: CPT

## 2022-06-30 PROCEDURE — 84443 ASSAY THYROID STIM HORMONE: CPT

## 2022-06-30 PROCEDURE — 2580000003 HC RX 258: Performed by: PHYSICIAN ASSISTANT

## 2022-06-30 PROCEDURE — 83550 IRON BINDING TEST: CPT

## 2022-06-30 PROCEDURE — 80053 COMPREHEN METABOLIC PANEL: CPT

## 2022-06-30 PROCEDURE — 84484 ASSAY OF TROPONIN QUANT: CPT

## 2022-06-30 PROCEDURE — 83540 ASSAY OF IRON: CPT

## 2022-06-30 PROCEDURE — 99284 EMERGENCY DEPT VISIT MOD MDM: CPT

## 2022-06-30 PROCEDURE — 93005 ELECTROCARDIOGRAM TRACING: CPT | Performed by: PHYSICIAN ASSISTANT

## 2022-06-30 PROCEDURE — 85014 HEMATOCRIT: CPT

## 2022-06-30 PROCEDURE — 85610 PROTHROMBIN TIME: CPT

## 2022-06-30 PROCEDURE — 82272 OCCULT BLD FECES 1-3 TESTS: CPT

## 2022-06-30 PROCEDURE — 85018 HEMOGLOBIN: CPT

## 2022-06-30 PROCEDURE — 36415 COLL VENOUS BLD VENIPUNCTURE: CPT

## 2022-06-30 PROCEDURE — 86850 RBC ANTIBODY SCREEN: CPT

## 2022-06-30 PROCEDURE — 82607 VITAMIN B-12: CPT

## 2022-06-30 PROCEDURE — 6360000002 HC RX W HCPCS: Performed by: PHYSICIAN ASSISTANT

## 2022-06-30 PROCEDURE — 93010 ELECTROCARDIOGRAM REPORT: CPT | Performed by: INTERNAL MEDICINE

## 2022-06-30 PROCEDURE — 96365 THER/PROPH/DIAG IV INF INIT: CPT

## 2022-06-30 PROCEDURE — 82746 ASSAY OF FOLIC ACID SERUM: CPT

## 2022-06-30 PROCEDURE — 85730 THROMBOPLASTIN TIME PARTIAL: CPT

## 2022-06-30 PROCEDURE — 86901 BLOOD TYPING SEROLOGIC RH(D): CPT

## 2022-06-30 PROCEDURE — 86900 BLOOD TYPING SEROLOGIC ABO: CPT

## 2022-06-30 RX ADMIN — IRON SUCROSE 200 MG: 20 INJECTION, SOLUTION INTRAVENOUS at 17:40

## 2022-06-30 ASSESSMENT — PAIN - FUNCTIONAL ASSESSMENT
PAIN_FUNCTIONAL_ASSESSMENT: NONE - DENIES PAIN

## 2022-06-30 ASSESSMENT — ENCOUNTER SYMPTOMS: SHORTNESS OF BREATH: 1

## 2022-06-30 NOTE — ED PROVIDER NOTES
[facility]  eMERGENCY dEPARTMENT eNCOUnter          CHIEF COMPLAINT       Chief Complaint   Patient presents with    Abnormal Lab       Nurses Notes reviewed and I agree except as noted in the HPI. HISTORY OF PRESENT ILLNESS    Paul Merritt is a 61 y.o. female who presents to the Emergency Department for the evaluation of anemia. Patient states that in late May she had a hemoglobin of 8.4 and was started on iron supplement at that time. She had some repeat testing done through her PCP yesterday that revealed hemoglobin of 7. She denies any abnormal bleeding visible. States she has had some black bowel movements that started only after she began the iron supplement and she notes a previous negative Hemoccult last month. States she does not take any Pepto-Bismol. She denies any associated abdominal pain, vomiting, unexpected weight loss, dizziness, syncope, chest pain or palpitations. She does report some fatigue but attributes this to poor sleep due to increased stress due to some family issues as well as pending total knee replacement and recovery from a foot surgery in April. States she has some occasional shortness of breath and this is not new and she attributes it to her weight. Denies any NSAID use or coffee intake. States she drinks 1-2 sodas per day, has cut back to assist in weight loss and has also eliminated sweets from her diet with resultant 10 pound weight loss. States she has had colonoscopies, most recently 4 years ago and they have always been normal.  No family history of colon cancer. No anticoagulant use. No other complaints today. The HPI was provided by the patient. REVIEW OF SYSTEMS     Review of Systems   Constitutional: Positive for fatigue. Respiratory: Positive for shortness of breath (chronic, at baseline). Psychiatric/Behavioral: Positive for sleep disturbance. The patient is nervous/anxious.     All other systems reviewed and are negative. PAST MEDICAL HISTORY    has a past medical history of Arthritis of left knee, Asthma, and Hypothyroidism. SURGICAL HISTORY      has a past surgical history that includes Tonsillectomy; Hysterectomy; Thyroid surgery (1/5/2011); and Ankle arthroscopy (Left, 05/15/2017). CURRENT MEDICATIONS       Discharge Medication List as of 6/30/2022  5:08 PM      CONTINUE these medications which have NOT CHANGED    Details   ferrous sulfate (FE TABS) 325 (65 Fe) MG EC tablet Take 1 tablet by mouth in the morning, at noon, and at bedtime, Disp-90 tablet, R-3Normal      levothyroxine (SYNTHROID) 50 MCG tablet take 1 tablet by mouth once daily, Disp-30 tablet, R-11DUE FOR WELLNESSNormal      valACYclovir (VALTREX) 500 MG tablet Take 1 tablet by mouth daily, Disp-30 tablet, R-11DUE FOR WELLNESSNormal      valsartan-hydroCHLOROthiazide (DIOVAN-HCT) 160-12.5 MG per tablet Take 1 tablet by mouth daily, Disp-30 tablet, R-11Normal      citalopram (CELEXA) 40 MG tablet Take 1 tablet by mouth daily, Disp-30 tablet, R-11Normal             ALLERGIES     is allergic to bee venom, tetracyclines & related, zithromax [azithromycin dihydrate], doxycycline, effexor [venlafaxine], synthroid [levothyroxine], and hydrochlorothiazide. FAMILY HISTORY     She indicated that the status of her mother is unknown. She indicated that the status of her father is unknown. She indicated that the status of her brother is unknown. She indicated that the status of her maternal aunt is unknown.   family history includes Arthritis in her mother; Birth Defects in her brother and father; Diabetes in her maternal aunt; High Blood Pressure in her mother. SOCIAL HISTORY      reports that she has never smoked. She has never used smokeless tobacco. She reports current alcohol use. She reports that she does not use drugs. PHYSICAL EXAM     INITIAL VITALS:  height is 5' 7\" (1.702 m) and weight is 200 lb (90.7 kg).  Her oral temperature is 98.1 °F (36.7 °C). Her blood pressure is 102/56 (abnormal) and her pulse is 70. Her respiration is 16 and oxygen saturation is 100%. Physical Exam  Vitals and nursing note reviewed. Constitutional:       Appearance: Normal appearance. HENT:      Head: Normocephalic and atraumatic. Eyes:      Conjunctiva/sclera: Conjunctivae normal.   Cardiovascular:      Rate and Rhythm: Normal rate and regular rhythm. Heart sounds: Normal heart sounds. No murmur heard. Pulmonary:      Effort: Pulmonary effort is normal. No respiratory distress. Breath sounds: Normal breath sounds. No wheezing or rhonchi. Abdominal:      Palpations: Abdomen is soft. Tenderness: There is no abdominal tenderness. There is no guarding or rebound. Musculoskeletal:      Cervical back: Normal range of motion. Right lower leg: No edema. Left lower leg: No edema. Skin:     General: Skin is warm and dry. Neurological:      General: No focal deficit present. Mental Status: She is alert and oriented to person, place, and time. Psychiatric:         Mood and Affect: Mood normal.         DIFFERENTIAL DIAGNOSIS:   Differential diagnoses are discussed    DIAGNOSTIC RESULTS     EKG: All EKG's are interpreted by the Emergency Department Physician who either signs or Co-signsthis chart in the absence of a cardiologist.    Vent. Rate: 73 bpm  PRinterval: 128 ms  QRS duration: 78 ms  QTc: 467 ms  P-R-T axes: -4, 22, 31  NSR. No STEMI.   Compared to old EKG on 4-8-22      RADIOLOGY: non-plain film images(s) such as CT, Ultrasound and MRI are read by the radiologist.    No orders to display       LABS:      Labs Reviewed   CBC WITH AUTO DIFFERENTIAL - Abnormal; Notable for the following components:       Result Value    WBC 4.2 (*)     RBC 3.07 (*)     Hemoglobin 7.2 (*)     Hematocrit 25.6 (*)     MCH 23.5 (*)     MCHC 28.1 (*)     RDW-CV 19.1 (*)     RDW-SD 58.2 (*)     Monocytes Absolute 0.3 (*)     All other components within normal limits   COMPREHENSIVE METABOLIC PANEL W/ REFLEX TO MG FOR LOW K - Abnormal; Notable for the following components: Total Bilirubin 0.2 (*)     All other components within normal limits   IRON SATURATION - Abnormal; Notable for the following components:    Iron Saturation 6 (*)     All other components within normal limits   IRON - Abnormal; Notable for the following components:    Iron 23 (*)     All other components within normal limits   GLOMERULAR FILTRATION RATE, ESTIMATED - Abnormal; Notable for the following components:    Est, Glom Filt Rate 84 (*)     All other components within normal limits   HEMOGLOBIN AND HEMATOCRIT - Abnormal; Notable for the following components:    Hemoglobin 7.2 (*)     Hematocrit 25.3 (*)     All other components within normal limits   BLOOD OCCULT STOOL SCREEN #1   PROTIME-INR   APTT   TROPONIN   VITAMIN B12 & FOLATE   TSH WITH REFLEX   ANION GAP   OSMOLALITY   SCAN OF BLOOD SMEAR   IRON BINDING CAPACITY   TYPE AND SCREEN       EMERGENCY DEPARTMENT COURSE:   Vitals:    Vitals:    06/30/22 1253 06/30/22 1423 06/30/22 1540 06/30/22 1654   BP: 106/80 131/78 (!) 103/57 (!) 102/56   Pulse: 62 72 70 70   Resp: 16 16 16 16   Temp:       TempSrc:       SpO2: 100% 99% 99% 100%   Weight:       Height:          12:31 PM EDT: The patient was seen and evaluated. Patient presents with reassuring vital signs for some ongoing anemia which has been present since March and worsening since that time. Denies any visible bleeding and denies any dark stools prior to initiating iron supplementation. She has been on iron supplementation since May but noted to have hemoglobin 7.0 upon outpatient labs yesterday. She does not really have any associated symptoms with this. She does note some fatigue but attributes this to poor sleep secondary to anxiety appropriate for situational stressors. Additional Hemoccult today was negative.   Hemoglobin 7.2 in the department and this remained stable on 3-hour recheck. Iron saturation is 6, iron 23, downward trending in comparison to yesterday's labs. Total iron binding capacity was within normal limits. At this time, I do not feel CT imaging is likely to yield any diagnosis due to repeated negative Hemoccults, no unexpected weight loss, abdominal pain, stool changes, family history of colon cancer. She does have history of routine screenings for colonoscopy that have been without abnormality, most recent 4 years ago, currently on every 10-year follow-up schedule. Mother and aunt both had diagnosis of iron deficiency anemia in their mid 76s with mother reporting having had many iron transfusions as a result. I discussed the case with hematology who is agreeable with 200 mg of Venofer in the department and follow-up with their office in 2 weeks. Patient was agreeable with this as well. She supposed to have a tooth pulled in 1 week, will contact her PCP office tomorrow to discuss a recheck prior to the procedure though she denies any history of abnormal bleeding with prior procedures. Signs and symptoms of anemia were discussed with the patient and mother at bedside and she is to return should the emergency department for further evaluation should she notice any or have any visible bleeding. She will continue her oral iron supplementation and contact hematology office tomorrow for follow-up. She was agreeable and denied further needs upon discharge. She remained hemodynamically stable during her ED stay. CRITICAL CARE:   None    CONSULTS:  Dr. gO Delgado, hematology    PROCEDURES:  None    FINAL IMPRESSION      1.  Iron deficiency anemia, unspecified iron deficiency anemia type          DISPOSITION/PLAN   Discharge    PATIENT REFERRED TO:  JANEY Villegas - SAURABH Flores [facility]  [address] [facility]  [phone]    Call in 1 day  for hemoglobin monitoring    Miah Dao MD  [address] [place] [address]  [phone]    Call in 1 day  to schedule follow-up in 2 weeks    [facility] EMERGENCY DEPT  [address] [place] [address]  [phone]    If symptoms worsen      DISCHARGEMEDICATIONS:  Discharge Medication List as of 6/30/2022  5:08 PM          (Please note that portions of this note were completedwith a voice recognition program.  Efforts were made to edit the dictations but occasionally words are mis-transcribed.)        Travon Hickey PA-C  07/01/22 0012

## 2022-06-30 NOTE — ED NOTES
Pt resting in cot. Updated on POC. VSS. Denies any needs.  Will monitor      Bobby Hernandez RN  06/30/22 1837

## 2022-07-06 ENCOUNTER — HOSPITAL ENCOUNTER (OUTPATIENT)
Age: 67
Discharge: HOME | End: 2022-07-06
Payer: COMMERCIAL

## 2022-07-06 DIAGNOSIS — D64.9 ANEMIA, UNSPECIFIED TYPE: ICD-10-CM

## 2022-07-06 LAB
HCT VFR BLD CALC: 28 % (ref 37–47)
HEMOGLOBIN: 7.9 GM/DL (ref 12–16)

## 2022-07-06 PROCEDURE — 85014 HEMATOCRIT: CPT

## 2022-07-06 PROCEDURE — 85018 HEMOGLOBIN: CPT

## 2022-07-06 PROCEDURE — 36415 COLL VENOUS BLD VENIPUNCTURE: CPT

## 2022-07-13 ENCOUNTER — HOSPITAL ENCOUNTER (OUTPATIENT)
Dept: MAMMOGRAPHY | Age: 67
Discharge: HOME | End: 2022-07-13
Payer: COMMERCIAL

## 2022-07-13 DIAGNOSIS — Z12.31 ENCOUNTER FOR SCREENING MAMMOGRAM FOR BREAST CANCER: ICD-10-CM

## 2022-07-13 PROCEDURE — 77063 BREAST TOMOSYNTHESIS BI: CPT

## 2022-07-15 ENCOUNTER — HOSPITAL ENCOUNTER (OUTPATIENT)
Age: 67
Discharge: HOME | End: 2022-07-15
Payer: COMMERCIAL

## 2022-07-15 DIAGNOSIS — R79.89 ABNORMAL CBC: ICD-10-CM

## 2022-07-15 DIAGNOSIS — D50.9 IRON DEFICIENCY ANEMIA, UNSPECIFIED IRON DEFICIENCY ANEMIA TYPE: ICD-10-CM

## 2022-07-15 PROCEDURE — 36415 COLL VENOUS BLD VENIPUNCTURE: CPT

## 2022-07-15 PROCEDURE — 85025 COMPLETE CBC W/AUTO DIFF WBC: CPT

## 2022-07-16 LAB
BASOPHILS # BLD: 0.4 %
BASOPHILS ABSOLUTE: 0 THOU/MM3 (ref 0–0.1)
ELLIPTOCYTES: ABNORMAL
EOSINOPHIL # BLD: 1.2 %
EOSINOPHILS ABSOLUTE: 0.1 THOU/MM3 (ref 0–0.4)
ERYTHROCYTE [DISTWIDTH] IN BLOOD BY AUTOMATED COUNT: 19 % (ref 11.5–14.5)
ERYTHROCYTE [DISTWIDTH] IN BLOOD BY AUTOMATED COUNT: 56.5 FL (ref 35–45)
HCT VFR BLD CALC: 29.4 % (ref 37–47)
HEMOGLOBIN: 8.3 GM/DL (ref 12–16)
HYPOCHROMIA: PRESENT
IMMATURE GRANS (ABS): 0.01 THOU/MM3 (ref 0–0.07)
IMMATURE GRANULOCYTES: 0.2 %
LYMPHOCYTES # BLD: 28.1 %
LYMPHOCYTES ABSOLUTE: 1.3 THOU/MM3 (ref 1–4.8)
MCH RBC QN AUTO: 23.2 PG (ref 26–33)
MCHC RBC AUTO-ENTMCNC: 28.2 GM/DL (ref 32.2–35.5)
MCV RBC AUTO: 82.1 FL (ref 81–99)
MONOCYTES # BLD: 8.1 %
MONOCYTES ABSOLUTE: 0.4 THOU/MM3 (ref 0.4–1.3)
NUCLEATED RED BLOOD CELLS: 0 /100 WBC
PLATELET # BLD: 304 THOU/MM3 (ref 130–400)
PLATELET ESTIMATE: ADEQUATE
PMV BLD AUTO: 11.3 FL (ref 9.4–12.4)
POIKILOCYTES: ABNORMAL
RBC # BLD: 3.58 MILL/MM3 (ref 4.2–5.4)
SCAN OF BLOOD SMEAR: NORMAL
SEG NEUTROPHILS: 62 %
SEGMENTED NEUTROPHILS ABSOLUTE COUNT: 3 THOU/MM3 (ref 1.8–7.7)
WBC # BLD: 4.8 THOU/MM3 (ref 4.8–10.8)

## 2022-08-05 ENCOUNTER — HOSPITAL ENCOUNTER (OUTPATIENT)
Dept: INFUSION THERAPY | Age: 67
Discharge: HOME | End: 2022-08-05
Payer: COMMERCIAL

## 2022-08-05 VITALS
TEMPERATURE: 97.5 F | OXYGEN SATURATION: 99 % | DIASTOLIC BLOOD PRESSURE: 67 MMHG | RESPIRATION RATE: 16 BRPM | SYSTOLIC BLOOD PRESSURE: 134 MMHG | HEART RATE: 65 BPM

## 2022-08-05 DIAGNOSIS — D50.9 IRON DEFICIENCY ANEMIA, UNSPECIFIED IRON DEFICIENCY ANEMIA TYPE: Primary | ICD-10-CM

## 2022-08-05 PROBLEM — D64.9 ANEMIA: Status: ACTIVE | Noted: 2022-08-05

## 2022-08-05 PROCEDURE — P9016 RBC LEUKOCYTES REDUCED: HCPCS

## 2022-08-05 PROCEDURE — 36430 TRANSFUSION BLD/BLD COMPNT: CPT

## 2022-08-05 PROCEDURE — 2580000003 HC RX 258: Performed by: INTERNAL MEDICINE

## 2022-08-05 RX ORDER — SODIUM CHLORIDE 9 MG/ML
20 INJECTION, SOLUTION INTRAVENOUS CONTINUOUS
Status: DISCONTINUED | OUTPATIENT
Start: 2022-08-05 | End: 2022-08-06 | Stop reason: HOSPADM

## 2022-08-05 RX ORDER — SODIUM CHLORIDE 9 MG/ML
20 INJECTION, SOLUTION INTRAVENOUS CONTINUOUS
Status: CANCELLED | OUTPATIENT
Start: 2022-08-05

## 2022-08-05 RX ADMIN — SODIUM CHLORIDE 20 ML/HR: 9 INJECTION, SOLUTION INTRAVENOUS at 08:53

## 2022-08-05 NOTE — PROGRESS NOTES
Patient assessed for the following post blood transfusion    Dizziness   No  Lightheadedness  No      Acute nausea/vomiting No  Headache   No  Chest pain/pressure  No  Rash/itching   No  Shortness of breath  No    Patient kept for 20 minutes observation post blood transfusion    Patient tolerated blood transfusion without any complications. Last vital signs:   /67   Pulse 65   Temp 97.5 °F (36.4 °C) (Oral)   Resp 16 Comment: lungs clear  SpO2 99%         Patient instructed if experience any of the above symptoms following today's infusion,he/she is to notify MD immediately or go to the emergency department. Discharge instructions given to patient. Verbalizes understanding. Ambulated off unit per self with belongings.

## 2022-08-05 NOTE — DISCHARGE INSTRUCTIONS
Please contact your Oncologist if you have any questions regarding the  blood transfusion that you received today. Patient instructed if experience any of the symptoms following today's transfusion / to notify MD immediately or go to emergency department.     * dizziness/lightheadedness  *acute nausea/vomiting - not relieved with medication  *headache - not relieved from Tylenol/pain medication  *chest pain/pressure  *rash/itching  *shortness of breath        Drink fluids - 48oz fluids daily  Call if develop fever/ chills/ signs or symptoms of infection     Keep follow up appt  With Dr. Claudean Held

## 2022-08-05 NOTE — PLAN OF CARE
Problem: Discharge Planning  Goal: Discharge to home or other facility with appropriate resources  Outcome: Adequate for Discharge  Flowsheets (Taken 8/5/2022 6665)  Discharge to home or other facility with appropriate resources: Identify barriers to discharge with patient and caregiver  Note: Verbalize understanding of discharge instructions, follow up appointments, and when to call Physician. Problem: Chronic Conditions and Co-morbidities  Goal: Patient's chronic conditions and co-morbidity symptoms are monitored and maintained or improved  Outcome: Adequate for Discharge  Flowsheets (Taken 8/5/2022 8761)  Care Plan - Patient's Chronic Conditions and Co-Morbidity Symptoms are Monitored and Maintained or Improved: Monitor and assess patient's chronic conditions and comorbid symptoms for stability, deterioration, or improvement  Note: Patient educated blood product transfusion protocol:    Patient receiving 1 UNIT PRBC:      - Blood product transfusion information sheet given: questions answered and consent signed  - Take vital signs/ monitor lungs sound prior to transfusion  - Monitor patient for 15 minutes after transfusion started  - Take vital signs / monitor lungs sound in 15 minutes and post transfusion  - Assess IV site   - Monitor patient closely for potential transfusion reaction    Call MD if develop complications once discharged. Problem: Safety - Adult  Goal: Free from fall injury  Outcome: Adequate for Discharge  Flowsheets (Taken 8/5/2022 0042)  Free From Fall Injury: Instruct family/caregiver on patient safety  Note: No falls occurred with visit today. Care plan reviewed with patient. Patient verbalizes understanding of the plan of care and contributes to goal setting.

## 2022-08-28 ENCOUNTER — APPOINTMENT (OUTPATIENT)
Dept: CT IMAGING | Age: 67
End: 2022-08-28
Payer: COMMERCIAL

## 2022-08-28 ENCOUNTER — HOSPITAL ENCOUNTER (OUTPATIENT)
Age: 67
Setting detail: OBSERVATION
Discharge: HOME | End: 2022-08-29
Attending: EMERGENCY MEDICINE | Admitting: INTERNAL MEDICINE
Payer: COMMERCIAL

## 2022-08-28 ENCOUNTER — APPOINTMENT (OUTPATIENT)
Dept: GENERAL RADIOLOGY | Age: 67
End: 2022-08-28
Payer: COMMERCIAL

## 2022-08-28 DIAGNOSIS — R06.09 DYSPNEA ON EXERTION: Primary | ICD-10-CM

## 2022-08-28 DIAGNOSIS — R31.29 MICROSCOPIC HEMATURIA: ICD-10-CM

## 2022-08-28 DIAGNOSIS — R10.9 LEFT FLANK PAIN: ICD-10-CM

## 2022-08-28 PROBLEM — J18.9 CAP (COMMUNITY ACQUIRED PNEUMONIA): Status: ACTIVE | Noted: 2022-08-28

## 2022-08-28 PROBLEM — R06.00 DYSPNEA: Status: ACTIVE | Noted: 2022-08-28

## 2022-08-28 LAB
ABSOLUTE RETIC #: 53 THOU/MM3 (ref 20–115)
ALBUMIN SERPL-MCNC: 4.1 G/DL (ref 3.5–5.1)
ALBUMIN SERPL-MCNC: 4.2 G/DL (ref 3.5–5.1)
ALP BLD-CCNC: 90 U/L (ref 38–126)
ALP BLD-CCNC: 91 U/L (ref 38–126)
ALT SERPL-CCNC: 14 U/L (ref 11–66)
ALT SERPL-CCNC: 16 U/L (ref 11–66)
ANION GAP SERPL CALCULATED.3IONS-SCNC: 10 MEQ/L (ref 8–16)
ANISOCYTOSIS: PRESENT
AST SERPL-CCNC: 16 U/L (ref 5–40)
AST SERPL-CCNC: 17 U/L (ref 5–40)
BACTERIA: NORMAL
BASOPHILS # BLD: 0.6 %
BASOPHILS ABSOLUTE: 0 THOU/MM3 (ref 0–0.1)
BILIRUB SERPL-MCNC: 0.2 MG/DL (ref 0.3–1.2)
BILIRUB SERPL-MCNC: 0.3 MG/DL (ref 0.3–1.2)
BILIRUBIN DIRECT: < 0.2 MG/DL (ref 0–0.3)
BILIRUBIN URINE: NEGATIVE
BLOOD, URINE: NEGATIVE
BUN BLDV-MCNC: 14 MG/DL (ref 7–22)
CALCIUM SERPL-MCNC: 9.5 MG/DL (ref 8.5–10.5)
CASTS: NORMAL /LPF
CASTS: NORMAL /LPF
CHARACTER, URINE: CLEAR
CHLORIDE BLD-SCNC: 104 MEQ/L (ref 98–111)
CO2: 25 MEQ/L (ref 23–33)
COLOR: YELLOW
CREAT SERPL-MCNC: 0.7 MG/DL (ref 0.4–1.2)
CRYSTALS: NORMAL
EKG ATRIAL RATE: 79 BPM
EKG ATRIAL RATE: 92 BPM
EKG P AXIS: 62 DEGREES
EKG P AXIS: 65 DEGREES
EKG P-R INTERVAL: 126 MS
EKG P-R INTERVAL: 132 MS
EKG Q-T INTERVAL: 380 MS
EKG Q-T INTERVAL: 414 MS
EKG QRS DURATION: 76 MS
EKG QRS DURATION: 78 MS
EKG QTC CALCULATION (BAZETT): 469 MS
EKG QTC CALCULATION (BAZETT): 474 MS
EKG R AXIS: 39 DEGREES
EKG R AXIS: 40 DEGREES
EKG T AXIS: 70 DEGREES
EKG T AXIS: 75 DEGREES
EKG VENTRICULAR RATE: 79 BPM
EKG VENTRICULAR RATE: 92 BPM
EOSINOPHIL # BLD: 3.7 %
EOSINOPHILS ABSOLUTE: 0.2 THOU/MM3 (ref 0–0.4)
EPITHELIAL CELLS, UA: NORMAL /HPF
ERYTHROCYTE [DISTWIDTH] IN BLOOD BY AUTOMATED COUNT: 23.1 % (ref 11.5–14.5)
ERYTHROCYTE [DISTWIDTH] IN BLOOD BY AUTOMATED COUNT: 70.7 FL (ref 35–45)
FERRITIN: 198 NG/ML (ref 10–291)
FOLATE: 13.5 NG/ML (ref 4.8–24.2)
GFR SERPL CREATININE-BSD FRML MDRD: 84 ML/MIN/1.73M2
GLUCOSE BLD-MCNC: 139 MG/DL (ref 70–108)
GLUCOSE, URINE: NEGATIVE MG/DL
HCT VFR BLD CALC: 33.1 % (ref 37–47)
HEMOGLOBIN: 9.7 GM/DL (ref 12–16)
IMMATURE GRANS (ABS): 0.01 THOU/MM3 (ref 0–0.07)
IMMATURE GRANULOCYTES: 0.2 %
IMMATURE RETIC FRACT: 14.6 % (ref 3–15.9)
IRON SATURATION: 8 % (ref 20–50)
IRON: 20 UG/DL (ref 50–170)
KETONES, URINE: NEGATIVE
LACTIC ACID: 1.2 MMOL/L (ref 0.5–2)
LEUKOCYTE ESTERASE, URINE: NEGATIVE
LYMPHOCYTES # BLD: 14.4 %
LYMPHOCYTES ABSOLUTE: 0.7 THOU/MM3 (ref 1–4.8)
MCH RBC QN AUTO: 24.9 PG (ref 26–33)
MCHC RBC AUTO-ENTMCNC: 29.3 GM/DL (ref 32.2–35.5)
MCV RBC AUTO: 85.1 FL (ref 81–99)
MISCELLANEOUS LAB TEST RESULT: NORMAL
MONOCYTES # BLD: 5.8 %
MONOCYTES ABSOLUTE: 0.3 THOU/MM3 (ref 0.4–1.3)
NITRITE, URINE: NEGATIVE
NUCLEATED RED BLOOD CELLS: 0 /100 WBC
OSMOLALITY CALCULATION: 280.3 MOSMOL/KG (ref 275–300)
PH UA: 5.5 (ref 5–9)
PLATELET # BLD: 232 THOU/MM3 (ref 130–400)
PMV BLD AUTO: 10.7 FL (ref 9.4–12.4)
POTASSIUM REFLEX MAGNESIUM: 4 MEQ/L (ref 3.5–5.2)
POTASSIUM SERPL-SCNC: 4 MEQ/L (ref 3.5–5.2)
PRO-BNP: 291.7 PG/ML (ref 0–900)
PROCALCITONIN: 0.08 NG/ML (ref 0.01–0.09)
PROTEIN UA: NEGATIVE MG/DL
RBC # BLD: 3.89 MILL/MM3 (ref 4.2–5.4)
RBC URINE: NORMAL /HPF
RENAL EPITHELIAL, UA: NORMAL
RETIC HEMOGLOBIN: 28 PG (ref 28.2–35.7)
RETICULOCYTE ABSOLUTE COUNT: 1.4 % (ref 0.5–2)
SARS-COV-2, NAAT: NOT DETECTED
SCAN OF BLOOD SMEAR: NORMAL
SEG NEUTROPHILS: 75.3 %
SEGMENTED NEUTROPHILS ABSOLUTE COUNT: 3.5 THOU/MM3 (ref 1.8–7.7)
SODIUM BLD-SCNC: 139 MEQ/L (ref 135–145)
SPECIFIC GRAVITY UA: 1.02 (ref 1–1.03)
TOTAL IRON BINDING CAPACITY: 258 UG/DL (ref 171–450)
TOTAL PROTEIN: 6.4 G/DL (ref 6.1–8)
TOTAL PROTEIN: 6.5 G/DL (ref 6.1–8)
TROPONIN T: < 0.01 NG/ML
TSH SERPL DL<=0.05 MIU/L-ACNC: 1.05 UIU/ML (ref 0.4–4.2)
UROBILINOGEN, URINE: 0.2 EU/DL (ref 0–1)
VITAMIN B-12: 560 PG/ML (ref 211–911)
WBC # BLD: 4.6 THOU/MM3 (ref 4.8–10.8)
WBC UA: NORMAL /HPF
YEAST: NORMAL

## 2022-08-28 PROCEDURE — 80053 COMPREHEN METABOLIC PANEL: CPT

## 2022-08-28 PROCEDURE — 82607 VITAMIN B-12: CPT

## 2022-08-28 PROCEDURE — 82728 ASSAY OF FERRITIN: CPT

## 2022-08-28 PROCEDURE — 6360000002 HC RX W HCPCS

## 2022-08-28 PROCEDURE — 71046 X-RAY EXAM CHEST 2 VIEWS: CPT

## 2022-08-28 PROCEDURE — 84484 ASSAY OF TROPONIN QUANT: CPT

## 2022-08-28 PROCEDURE — 36415 COLL VENOUS BLD VENIPUNCTURE: CPT

## 2022-08-28 PROCEDURE — 74177 CT ABD & PELVIS W/CONTRAST: CPT

## 2022-08-28 PROCEDURE — 87635 SARS-COV-2 COVID-19 AMP PRB: CPT

## 2022-08-28 PROCEDURE — 85046 RETICYTE/HGB CONCENTRATE: CPT

## 2022-08-28 PROCEDURE — 99285 EMERGENCY DEPT VISIT HI MDM: CPT

## 2022-08-28 PROCEDURE — 84145 PROCALCITONIN (PCT): CPT

## 2022-08-28 PROCEDURE — G0378 HOSPITAL OBSERVATION PER HR: HCPCS

## 2022-08-28 PROCEDURE — 93010 ELECTROCARDIOGRAM REPORT: CPT | Performed by: INTERNAL MEDICINE

## 2022-08-28 PROCEDURE — 6360000004 HC RX CONTRAST MEDICATION: Performed by: EMERGENCY MEDICINE

## 2022-08-28 PROCEDURE — 83605 ASSAY OF LACTIC ACID: CPT

## 2022-08-28 PROCEDURE — 82746 ASSAY OF FOLIC ACID SERUM: CPT

## 2022-08-28 PROCEDURE — 83540 ASSAY OF IRON: CPT

## 2022-08-28 PROCEDURE — 85025 COMPLETE CBC W/AUTO DIFF WBC: CPT

## 2022-08-28 PROCEDURE — 83550 IRON BINDING TEST: CPT

## 2022-08-28 PROCEDURE — 93005 ELECTROCARDIOGRAM TRACING: CPT | Performed by: EMERGENCY MEDICINE

## 2022-08-28 PROCEDURE — 96366 THER/PROPH/DIAG IV INF ADDON: CPT

## 2022-08-28 PROCEDURE — 81001 URINALYSIS AUTO W/SCOPE: CPT

## 2022-08-28 PROCEDURE — 2580000003 HC RX 258

## 2022-08-28 PROCEDURE — 99219 PR INITIAL OBSERVATION CARE/DAY 50 MINUTES: CPT | Performed by: INTERNAL MEDICINE

## 2022-08-28 PROCEDURE — 83880 ASSAY OF NATRIURETIC PEPTIDE: CPT

## 2022-08-28 PROCEDURE — 96365 THER/PROPH/DIAG IV INF INIT: CPT

## 2022-08-28 PROCEDURE — 84443 ASSAY THYROID STIM HORMONE: CPT

## 2022-08-28 RX ORDER — POTASSIUM CHLORIDE 20 MEQ/1
40 TABLET, EXTENDED RELEASE ORAL PRN
Status: DISCONTINUED | OUTPATIENT
Start: 2022-08-28 | End: 2022-08-29 | Stop reason: HOSPADM

## 2022-08-28 RX ORDER — POLYETHYLENE GLYCOL 3350 17 G/17G
17 POWDER, FOR SOLUTION ORAL DAILY PRN
Status: DISCONTINUED | OUTPATIENT
Start: 2022-08-28 | End: 2022-08-29 | Stop reason: HOSPADM

## 2022-08-28 RX ORDER — ACETAMINOPHEN 325 MG/1
650 TABLET ORAL EVERY 6 HOURS PRN
Status: DISCONTINUED | OUTPATIENT
Start: 2022-08-28 | End: 2022-08-29 | Stop reason: HOSPADM

## 2022-08-28 RX ORDER — ACETAMINOPHEN 650 MG/1
650 SUPPOSITORY RECTAL EVERY 6 HOURS PRN
Status: DISCONTINUED | OUTPATIENT
Start: 2022-08-28 | End: 2022-08-29 | Stop reason: HOSPADM

## 2022-08-28 RX ORDER — CITALOPRAM 40 MG/1
40 TABLET ORAL DAILY
Status: DISCONTINUED | OUTPATIENT
Start: 2022-08-29 | End: 2022-08-29 | Stop reason: HOSPADM

## 2022-08-28 RX ORDER — MAGNESIUM SULFATE IN WATER 40 MG/ML
2000 INJECTION, SOLUTION INTRAVENOUS PRN
Status: DISCONTINUED | OUTPATIENT
Start: 2022-08-28 | End: 2022-08-29 | Stop reason: HOSPADM

## 2022-08-28 RX ORDER — POTASSIUM CHLORIDE 7.45 MG/ML
10 INJECTION INTRAVENOUS PRN
Status: DISCONTINUED | OUTPATIENT
Start: 2022-08-28 | End: 2022-08-29 | Stop reason: HOSPADM

## 2022-08-28 RX ORDER — ONDANSETRON 2 MG/ML
4 INJECTION INTRAMUSCULAR; INTRAVENOUS EVERY 6 HOURS PRN
Status: DISCONTINUED | OUTPATIENT
Start: 2022-08-28 | End: 2022-08-29 | Stop reason: HOSPADM

## 2022-08-28 RX ORDER — ONDANSETRON 4 MG/1
4 TABLET, ORALLY DISINTEGRATING ORAL EVERY 8 HOURS PRN
Status: DISCONTINUED | OUTPATIENT
Start: 2022-08-28 | End: 2022-08-29 | Stop reason: HOSPADM

## 2022-08-28 RX ORDER — BENZONATATE 100 MG/1
100 CAPSULE ORAL 3 TIMES DAILY PRN
Status: DISCONTINUED | OUTPATIENT
Start: 2022-08-28 | End: 2022-08-29 | Stop reason: HOSPADM

## 2022-08-28 RX ORDER — LEVOTHYROXINE SODIUM 0.05 MG/1
50 TABLET ORAL DAILY
Status: DISCONTINUED | OUTPATIENT
Start: 2022-08-29 | End: 2022-08-29 | Stop reason: HOSPADM

## 2022-08-28 RX ORDER — ENOXAPARIN SODIUM 100 MG/ML
40 INJECTION SUBCUTANEOUS DAILY
Status: DISCONTINUED | OUTPATIENT
Start: 2022-08-28 | End: 2022-08-29 | Stop reason: HOSPADM

## 2022-08-28 RX ORDER — SODIUM CHLORIDE 0.9 % (FLUSH) 0.9 %
5-40 SYRINGE (ML) INJECTION EVERY 12 HOURS SCHEDULED
Status: DISCONTINUED | OUTPATIENT
Start: 2022-08-28 | End: 2022-08-29 | Stop reason: HOSPADM

## 2022-08-28 RX ORDER — LEVOFLOXACIN 5 MG/ML
750 INJECTION, SOLUTION INTRAVENOUS EVERY 24 HOURS
Status: DISCONTINUED | OUTPATIENT
Start: 2022-08-28 | End: 2022-08-29

## 2022-08-28 RX ORDER — SODIUM CHLORIDE 9 MG/ML
INJECTION, SOLUTION INTRAVENOUS PRN
Status: DISCONTINUED | OUTPATIENT
Start: 2022-08-28 | End: 2022-08-29 | Stop reason: HOSPADM

## 2022-08-28 RX ORDER — PANTOPRAZOLE SODIUM 40 MG/1
40 TABLET, DELAYED RELEASE ORAL DAILY
Status: DISCONTINUED | OUTPATIENT
Start: 2022-08-29 | End: 2022-08-29 | Stop reason: HOSPADM

## 2022-08-28 RX ORDER — SODIUM CHLORIDE 0.9 % (FLUSH) 0.9 %
5-40 SYRINGE (ML) INJECTION PRN
Status: DISCONTINUED | OUTPATIENT
Start: 2022-08-28 | End: 2022-08-29 | Stop reason: HOSPADM

## 2022-08-28 RX ADMIN — IOPAMIDOL 80 ML: 755 INJECTION, SOLUTION INTRAVENOUS at 14:49

## 2022-08-28 RX ADMIN — SODIUM CHLORIDE, PRESERVATIVE FREE 10 ML: 5 INJECTION INTRAVENOUS at 20:41

## 2022-08-28 RX ADMIN — LEVOFLOXACIN 750 MG: 5 INJECTION, SOLUTION INTRAVENOUS at 20:42

## 2022-08-28 ASSESSMENT — LIFESTYLE VARIABLES
HOW MANY STANDARD DRINKS CONTAINING ALCOHOL DO YOU HAVE ON A TYPICAL DAY: 1 OR 2
HOW OFTEN DO YOU HAVE A DRINK CONTAINING ALCOHOL: 2-4 TIMES A MONTH

## 2022-08-28 ASSESSMENT — ENCOUNTER SYMPTOMS
VOICE CHANGE: 0
SHORTNESS OF BREATH: 1
BACK PAIN: 0
DIARRHEA: 0
CHEST TIGHTNESS: 0
VOMITING: 0
ABDOMINAL PAIN: 0
TROUBLE SWALLOWING: 0
BLOOD IN STOOL: 0
COUGH: 1
NAUSEA: 0

## 2022-08-28 ASSESSMENT — PAIN SCALES - GENERAL
PAINLEVEL_OUTOF10: 8
PAINLEVEL_OUTOF10: 8

## 2022-08-28 ASSESSMENT — PAIN - FUNCTIONAL ASSESSMENT
PAIN_FUNCTIONAL_ASSESSMENT: 0-10
PAIN_FUNCTIONAL_ASSESSMENT: NONE - DENIES PAIN
PAIN_FUNCTIONAL_ASSESSMENT: 0-10
PAIN_FUNCTIONAL_ASSESSMENT: NONE - DENIES PAIN

## 2022-08-28 ASSESSMENT — PAIN DESCRIPTION - LOCATION: LOCATION: FLANK

## 2022-08-28 ASSESSMENT — PAIN DESCRIPTION - ORIENTATION: ORIENTATION: LEFT;LOWER

## 2022-08-28 NOTE — ED PROVIDER NOTES
V5 and V6 and is very mild. There is T wave inversion in V2. ST depression seems to be new. RADIOLOGY: (none if blank)   Interpretation per the Radiologistbelow, if available at the time of this note:    CT ABDOMEN PELVIS W IV CONTRAST Additional Contrast? None   Final Result   1. Diverticulosis coli. Findings of constipation. 2. Moderate-sized hiatus hernia. 3. No acute findings in the abdomen or pelvis. .            **This report has been created using voice recognition software. It may contain minor errors which are inherent in voice recognition technology. **      Final report electronically signed by Dr. Martin Carey on 8/28/2022 3:12 PM      XR CHEST (2 VW)   Final Result   1. Borderline heart size. Small hiatus hernia. 2. No acute findings. No infiltrates or effusions are seen. **This report has been created using voice recognition software. It may contain minor errors which are inherent in voice recognition technology. **      Final report electronically signed by Dr. Martin Carey on 8/28/2022 12:58 PM          LABS:  Labs Reviewed   BASIC METABOLIC PANEL W/ REFLEX TO MG FOR LOW K - Abnormal; Notable for the following components:       Result Value    Glucose 139 (*)     All other components within normal limits   CBC WITH AUTO DIFFERENTIAL - Abnormal; Notable for the following components:    WBC 4.6 (*)     RBC 3.89 (*)     Hemoglobin 9.7 (*)     Hematocrit 33.1 (*)     MCH 24.9 (*)     MCHC 29.3 (*)     RDW-CV 23.1 (*)     RDW-SD 70.7 (*)     Lymphocytes Absolute 0.7 (*)     Monocytes Absolute 0.3 (*)     All other components within normal limits   GLOMERULAR FILTRATION RATE, ESTIMATED - Abnormal; Notable for the following components:    Est, Glom Filt Rate 84 (*)     All other components within normal limits   HEPATIC FUNCTION PANEL - Abnormal; Notable for the following components:     Total Bilirubin 0.2 (*)     All other components within normal limits   COVID-19, RAPID   BRAIN NATRIURETIC PEPTIDE   COMPREHENSIVE METABOLIC PANEL   LACTIC ACID   ANION GAP   OSMOLALITY   URINALYSIS WITH MICROSCOPIC   TSH WITH REFLEX   TROPONIN   SCAN OF BLOOD SMEAR       All other labs were within normal range or not returned as of this dictation. Please note, any cultures that may have been sent were not resulted at the time of this patient visit. EMERGENCY DEPARTMENT COURSE andMedical Decision Making:     MDM  /   Patient presents with new/worsened exertional dyspnea and although she does not have any chest pain and has not any chest pain with this, does sometimes feel some tightness with exertion. EKG does show some minimal ST depressions in the lateral leads, slightly worse from previous. She has not had a cardiac evaluation and I feel would benefit from an echo sooner rather than later      ED Medications administered this visit:    Medications   iopamidol (ISOVUE-370) 76 % injection 80 mL (80 mLs IntraVENous Given 8/28/22 1449)         Procedures: (None if blank)         CLINICAL IMPRESSION     1. Dyspnea on exertion    2. Left flank pain    3.  Microscopic hematuria          DISPOSITION/PLAN   DISPOSITION Decision To Admit 08/28/2022 04:05:54 PM      PATIENT REFERRED TO:  [facility]  [address]  In 3 days      DISCHARGE MEDICATIONS:  New Prescriptions    No medications on file           (Please note that portions of this note were completed with a voice recognition program.  Efforts were made to edit the dictations but occasionallywords are mis-transcribed.)      Katherine Cid DO,ABRAHAM (electronically signed)  Attending Physician, Emergency [address], DO  08/28/22 3201

## 2022-08-28 NOTE — PLAN OF CARE
Problem: Discharge Planning  Goal: Discharge to home or other facility with appropriate resources  Flowsheets (Taken 8/28/2022 5140)  Discharge to home or other facility with appropriate resources: Identify barriers to discharge with patient and caregiver  Note: Patient will discharge home when medically stable. Problem: Safety - Adult  Goal: Free from fall injury  Note: Call light in reach. Bed wheels locked. Bed alarm on. Patient calls out appropriately.

## 2022-08-28 NOTE — ED NOTES
Pt resting in cot. Denies any pain at this time. VSS.  Will monitor      Anali Killian RN  08/28/22 6102

## 2022-08-28 NOTE — H&P
Internal Medicine Resident History and Physical          Patient: Stef Lindquist  : 1955  MRN: [id number]     Acct: [de-identified]    PCP: JANEY Maria - CNP  Date of Admission: 2022  Date of Service: Pt seen/examined on 22  and Admitted to Observation with expected LOS less than two midnights due to medical therapy. Hospital Problems             Last Modified POA    * (Principal) CAP (community acquired pneumonia) 2022 Yes    Dyspnea 2022 Yes       Assessment and Plan:    CAP  Afebrile w/o leukocytosis. LA WNL. Productive cough w/ green sputum. CXR w/o acute processes. Left lung fields w/ inspiratory and expiratory wheezing w/o improvements in forced expiration. Cannot r/o atypical pneumonia  Obtain Procal for further evaluation  Levofloxacin IV ( - ) for 5 days. Tessalon pearls prn. Trend CBC    Atypical CP  Bilateral anterior chest wall pain associated w/ coughs. Does not worsen w/ exertion, does not relieve w/ rest. Dyspnea on exertion. BNP and trops WNL. TSH WNL. EKG w/ T wave inversion in anterior leads and T wave flattening in inferior leads unchanged from prior; no acute ischemic changes noted. HEART score 2; Low suspicion for cardiac etiology in setting of chronic EKG T wave abnormalities. Echo. 4PEPS score 1; Low suspicion for PE. US Doppler BLE. Tele monitoring  Monitor vitals    Iron Deficiency Anemia  Diagnosed 2022. Hgb 9.7, likely at baseline. Receiving infusions from Dr. Foreign Fraser OP. GI work-up OP w/ endoscopy and colonoscopy w/o significant findings. Obtained Iron Panel, Retic, B12&Folate for further evaluation. Consider Venofer pending results  Trend CBC    Nephrolithiasis, resolved  Hx Nephrolithiasis. Back pain w/ hematuria; has resolved upon ED admit. Afebrile, no leukocytosis. CT A/P notable for 2mm non-obstructive stones in left kidney. She passed previous stones w/o needing intervention; likely has passed this stone already. Pain management prn. Continued to monitor. HTN: held home Valsartan-HCTZ in setting of soft BPs on admit  Asthma: currently not on any home medications  Hypothyroidism: continued home Synthroid  Anxiety/Depression: continued home Celexa  Hiatal Hernia: follows w/ GI OP. S/p endoscopy and colonoscopy (7/2022). Continued home Protonix. Hx Nephrolithiasis: noted, last episode >1 years ago.          =======================================================================      Chief Complaint:  Hematuria    History Of Present Illness:  Alvan Nyhan is a 61 y.o. female with PMHx of asthma, hypothyroidism, HTN, and previous nephrolithiasis (>1 year ago) who presents to [address] [place] [address] with hematuria. Patient reported chronically stable SOB for 1 year; initially seen by PCP and diagnosed w/ iron deficiency anemia. Unresolved on oral iron supplements and referred to Dr. Amanda Blackman from [facility] ED for transfusions 6/2022. She had been getting transfusions w/o any improvement in dyspnea. Endorsed that she was ambulatory w/ full ADLs.  8/25/2022, patient had episode of hematuria w/ left back pain which has since resolved. Also endorsed productive cough w/ green sputum starting then; associated bilateral chest pain during coughing episodes which does not improve w/ rest or worsen on exertion. Left lung fields w/ inspiratory and expiratory wheezing w/o improvements in forced expiration. Denied fevers, chills, night sweats, headaches, SOB, N/V/C/D, abdominal pain, alterations in appetite or weight. Of note, her previous kidney stones were all passed w/o needing intervention. ED course: Vitals stable in ED, afebrile. CMP unremarkable, TSH, BNP, trop WNL. LA 1.2, WBC 4.6, Hgb 9.7. UA negative for UTI, notable for 5-10 RBC. CXR w/o acute processes. CT A/P  notable for constipation, hiatal hernia, and 2mm non-obstructive stones in left kidney.  EKG w/ T wave inversion in anterior leads and T wave flattening in inferior leads unchanged from prior; no acute ischemic changes noted. Admitted to observation w/ telemetry monitoring for management. Past Medical History:        Diagnosis Date    Arthritis of left knee 2/8/2022    Asthma     Hypothyroidism 9/9/2013       Past Surgical History:        Procedure Laterality Date    ANKLE ARTHROSCOPY Left 05/15/2017    HYSTERECTOMY (CERVIX STATUS UNKNOWN)      THYROID SURGERY  1/5/2011    TONSILLECTOMY         Medications Prior to Admission:   Prior to Admission medications    Medication Sig Start Date End Date Taking? Authorizing Provider   Potassium 99 MG TABS Take 99 mg by mouth daily as needed (muscle cramps)   Yes Historical Provider, MD   pantoprazole (PROTONIX) 40 MG tablet Take 40 mg by mouth 2 times daily 7/7/22   Historical Provider, MD   levothyroxine (SYNTHROID) 50 MCG tablet take 1 tablet by mouth once daily 2/25/22   Giulia Dose, APRN - CNP   valACYclovir (VALTREX) 500 MG tablet Take 1 tablet by mouth daily 2/25/22   Giulia Dose, APRN - CNP   valsartan-hydroCHLOROthiazide (DIOVAN-HCT) 160-12.5 MG per tablet Take 1 tablet by mouth daily 2/25/22   Giulia Dose, APRN - CNP   citalopram (CELEXA) 40 MG tablet Take 1 tablet by mouth daily 2/25/22   Giulia Dose, APRN - CNP       Allergies:  Bee venom, Tetracyclines & related, Zithromax [azithromycin dihydrate], Doxycycline, Effexor [venlafaxine], Synthroid [levothyroxine], Cyclobenzaprine, and Hydrochlorothiazide    Social History:    The patient currently lives at home. Tobacco use:   reports that she has never smoked. She has never used smokeless tobacco.  Alcohol use:   reports current alcohol use. Drug use:  reports no history of drug use.      Family History:   as follows:      Problem Relation Age of Onset    Arthritis Mother     High Blood Pressure Mother     Breast Cancer Mother 80    Birth Defects Father     Birth Defects Brother     Diabetes Maternal Aunt     Breast Cancer Maternal Cousin 64       Review of Systems:   Pertinent positives and negatives as noted in the HPI. Otherwise complete ROS negative. Physical Exam:    /75   Pulse 72   Temp 98 °F (36.7 °C) (Oral)   Resp 18   Ht 5' 7\" (1.702 m)   Wt 202 lb 11.2 oz (91.9 kg)   SpO2 98%   BMI 31.75 kg/m²       General appearance: No apparent distress, appears stated age. Eyes:  Pupils equal, round, and reactive to light. Conjunctivae/corneas clear. HENT: Head normal in appearance. External nares normal.  Oral mucosa moist without lesions. Hearing grossly intact. Neck: Supple, with full range of motion. Trachea midline. No gross JVD appreciated. Respiratory:  Normal respiratory effort. Left lung inspiratory and expiratory wheezes in upper and lower lobe; no improvements w/ forced expiration. Left posterior lower rib tenderness. Cardiovascular: Normal rate, regular rhythm with normal S1/S2 without murmurs. No lower extremity edema. No ttp on anterior or posterior thoracic wall. Abdomen: Soft, non-tender, non-distended with normal bowel sounds. No CVA tenderness bilaterally. Musculoskeletal: No joint swelling or tenderness. Normal tone. No abnormal movements. Skin: Warm and dry. No rashes or lesions. Neurologic:  No focal sensory/motor deficits in the upper and lower extremities. Psychiatric: Alert and oriented, normal insight and thought content. Capillary Refill: Brisk,< 3 seconds. Peripheral Pulses: +2 palpable, equal bilaterally. Labs:     Recent Labs     08/28/22  1150   WBC 4.6*   HGB 9.7*   HCT 33.1*        Recent Labs     08/28/22  1150      K 4.0  4.0      CO2 25   BUN 14   CREATININE 0.7   CALCIUM 9.5     Recent Labs     08/28/22  1150   AST 17  16   ALT 16  14   BILIDIR <0.2   BILITOT 0.3  0.2*   ALKPHOS 91  90     No results for input(s): INR in the last 72 hours. No results for input(s): Yuki Comber in the last 72 hours.   Lab Results   Component Value Date/Time    NITRU NEGATIVE 08/28/2022 01:00 PM    WBCUA 0-2 08/28/2022 01:00 PM    BACTERIA NONE SEEN 08/28/2022 01:00 PM    RBCUA 5-10 08/28/2022 01:00 PM    BLOODU NEGATIVE 08/28/2022 01:00 PM    SPECGRAV 1.020 08/28/2022 01:00 PM    GLUCOSEU negative 02/25/2022 04:00 PM    GLUCOSEU Negative 10/27/2016 04:19 PM         Radiology:     CT ABDOMEN PELVIS W IV CONTRAST Additional Contrast? None   Final Result   1. Diverticulosis coli. Findings of constipation. 2. Moderate-sized hiatus hernia. 3. No acute findings in the abdomen or pelvis. .            **This report has been created using voice recognition software. It may contain minor errors which are inherent in voice recognition technology. **      Final report electronically signed by Dr. Roosevelt Verdugo on 8/28/2022 3:12 PM      XR CHEST (2 VW)   Final Result   1. Borderline heart size. Small hiatus hernia. 2. No acute findings. No infiltrates or effusions are seen. **This report has been created using voice recognition software. It may contain minor errors which are inherent in voice recognition technology. **      Final report electronically signed by Dr. Roosevelt Verdugo on 8/28/2022 12:58 PM             EKG:  I have reviewed the EKG with the following interpretation: EKG w/ T wave inversion in anterior leads and T wave flattening in inferior leads unchanged from prior; no acute ischemic changes noted. PT/OT Eval Status: patient will be assessed  Diet: ADULT DIET; Regular  DVT prophylaxis: lovenox 40mg SC  Code Status: Full Code  Disposition: admit to observation; likely discharge home when medically stable. Thank you JANEY Dominguez - SAURABH for the opportunity to be involved in this patient's care.     Electronically signed by Fred Fang DO on 8/28/2022 at 6:20 PM.     Case discussed with Attending, Dr. Juan Manuel Blevins DO  .

## 2022-08-28 NOTE — ED NOTES
Patient ambulated around department with supervision of this RN. Patient gets winded by end of walk but pulse ox remains 96% on room air. Patient does state she does have shortness of breath. Repeat EKG done at this time per Dr Telly Ramsey.       Daija Rice RN  08/28/22 7714

## 2022-08-28 NOTE — ED NOTES
Pt to ED c/o shortness of breath, hematuria, and left sided flank pain. Pt states it feels like a kidney stone. Hx of low iron that causes SOB; last infusion was a week ago Thursday. EKG complete. Blood work sent.  Dagoberto Nunes RN  08/28/22 1587

## 2022-08-28 NOTE — DISCHARGE INSTRUCTIONS
Return to the Emergency Department immediately if you develop any persistent or worsening shortness of breath, chest pain, fever, vomiting, worsening pain , or you have any other concerns. Please follow up with your primary care doctor in 1-2 days, and urologist for the blood in the urine.

## 2022-08-29 ENCOUNTER — APPOINTMENT (OUTPATIENT)
Dept: INTERVENTIONAL RADIOLOGY/VASCULAR | Age: 67
End: 2022-08-29
Payer: COMMERCIAL

## 2022-08-29 VITALS
OXYGEN SATURATION: 96 % | BODY MASS INDEX: 31.81 KG/M2 | TEMPERATURE: 98.2 F | HEIGHT: 67 IN | DIASTOLIC BLOOD PRESSURE: 66 MMHG | HEART RATE: 76 BPM | WEIGHT: 202.7 LBS | RESPIRATION RATE: 18 BRPM | SYSTOLIC BLOOD PRESSURE: 113 MMHG

## 2022-08-29 LAB
ACINETOBACTER CALCOACETICUS-BAUMANNII BY PCR: NOT DETECTED
ADENOVIRUS BY PCR: NOT DETECTED
ANION GAP SERPL CALCULATED.3IONS-SCNC: 10 MEQ/L (ref 8–16)
BUN BLDV-MCNC: 17 MG/DL (ref 7–22)
CALCIUM SERPL-MCNC: 9.1 MG/DL (ref 8.5–10.5)
CHLAMYDIA PNEUMONIAE BY PCR: NOT DETECTED
CHLORIDE BLD-SCNC: 106 MEQ/L (ref 98–111)
CO2: 25 MEQ/L (ref 23–33)
CREAT SERPL-MCNC: 0.8 MG/DL (ref 0.4–1.2)
ENTEROBACTER CLOACAE COMPLEX BY PCR: NOT DETECTED
ERYTHROCYTE [DISTWIDTH] IN BLOOD BY AUTOMATED COUNT: 22.9 % (ref 11.5–14.5)
ERYTHROCYTE [DISTWIDTH] IN BLOOD BY AUTOMATED COUNT: 73.5 FL (ref 35–45)
ESCHERICHIA COLI BY PCR: NOT DETECTED
GFR SERPL CREATININE-BSD FRML MDRD: 72 ML/MIN/1.73M2
GLUCOSE BLD-MCNC: 101 MG/DL (ref 70–108)
HAEMOPHILUS INFLUENZAE BY PCR: NOT DETECTED
HCT VFR BLD CALC: 32.6 % (ref 37–47)
HCT VFR BLD CALC: 34.1 % (ref 37–47)
HEMOGLOBIN: 9.2 GM/DL (ref 12–16)
HEMOGLOBIN: 9.8 GM/DL (ref 12–16)
INFLUENZA A BY PCR: NOT DETECTED
INFLUENZA B BY PCR: NOT DETECTED
KLEBSIELLA AEROGENES BY PCR: NOT DETECTED
KLEBSIELLA OXYTOCA BY PCR: NOT DETECTED
KLEBSIELLA PNEUMONIAE GROUP BY PCR: NOT DETECTED
LEGIONELLA PNEUMOPHILIA BY PCR: NOT DETECTED
LV EF: 60 %
LVEF MODALITY: NORMAL
MCH RBC QN AUTO: 24.9 PG (ref 26–33)
MCHC RBC AUTO-ENTMCNC: 28.2 GM/DL (ref 32.2–35.5)
MCV RBC AUTO: 88.1 FL (ref 81–99)
METAPNEUMOVIRUS BY PCR: NOT DETECTED
MORAXELLA CATARRHALIS BY PCR: NOT DETECTED
MYCOPLASMA PNEUMONIAE BY PCR: NOT DETECTED
NON-SARS CORONAVIRUS: NOT DETECTED
PARAINFLUENZA VIRUS BY PCR: NOT DETECTED
PLATELET # BLD: 220 THOU/MM3 (ref 130–400)
PMV BLD AUTO: 10.9 FL (ref 9.4–12.4)
POTASSIUM SERPL-SCNC: 5 MEQ/L (ref 3.5–5.2)
PROTEUS SPECIES BY PCR: NOT DETECTED
PSEUDOMONAS AERUGINOSA BY PCR: NOT DETECTED
RBC # BLD: 3.7 MILL/MM3 (ref 4.2–5.4)
RESISTANT GENE CTX-M BY PCR: ABNORMAL
RESISTANT GENE IMP BY PCR: ABNORMAL
RESISTANT GENE KPC BY PCR: ABNORMAL
RESISTANT GENE MECA/C & MREJ BY PCR: ABNORMAL
RESISTANT GENE NDM BY PCR: ABNORMAL
RESISTANT GENE OXA-48-LIKE BY PCR: ABNORMAL
RESISTANT GENE VIM BY PCR: ABNORMAL
RESPIRATORY SYNCYTIAL VIRUS BY PCR: NOT DETECTED
RHINOVIRUS ENTEROVIRUS PCR: DETECTED
SERRATIA MARCESCENS BY PCR: NOT DETECTED
SODIUM BLD-SCNC: 141 MEQ/L (ref 135–145)
SOURCE: ABNORMAL
SPECIMEN ACCEPTABILITY: ABNORMAL
STAPH AUREUS BY PCR: NOT DETECTED
STREP AGALACTIAE BY PCR: NOT DETECTED
STREP PNEUMONIAE BY PCR: NOT DETECTED
STREP PYOGENES BY PCR: NOT DETECTED
WBC # BLD: 4.2 THOU/MM3 (ref 4.8–10.8)

## 2022-08-29 PROCEDURE — 87581 M.PNEUMON DNA AMP PROBE: CPT

## 2022-08-29 PROCEDURE — G0378 HOSPITAL OBSERVATION PER HR: HCPCS

## 2022-08-29 PROCEDURE — 85027 COMPLETE CBC AUTOMATED: CPT

## 2022-08-29 PROCEDURE — 85014 HEMATOCRIT: CPT

## 2022-08-29 PROCEDURE — 93970 EXTREMITY STUDY: CPT

## 2022-08-29 PROCEDURE — 87205 SMEAR GRAM STAIN: CPT

## 2022-08-29 PROCEDURE — 87486 CHLMYD PNEUM DNA AMP PROBE: CPT

## 2022-08-29 PROCEDURE — 80048 BASIC METABOLIC PNL TOTAL CA: CPT

## 2022-08-29 PROCEDURE — 36415 COLL VENOUS BLD VENIPUNCTURE: CPT

## 2022-08-29 PROCEDURE — 87631 RESP VIRUS 3-5 TARGETS: CPT

## 2022-08-29 PROCEDURE — 85018 HEMOGLOBIN: CPT

## 2022-08-29 PROCEDURE — 87070 CULTURE OTHR SPECIMN AEROBIC: CPT

## 2022-08-29 PROCEDURE — 6370000000 HC RX 637 (ALT 250 FOR IP)

## 2022-08-29 PROCEDURE — 2580000003 HC RX 258

## 2022-08-29 PROCEDURE — 87798 DETECT AGENT NOS DNA AMP: CPT

## 2022-08-29 PROCEDURE — 99217 PR OBSERVATION CARE DISCHARGE MANAGEMENT: CPT | Performed by: INTERNAL MEDICINE

## 2022-08-29 PROCEDURE — 87541 LEGION PNEUMO DNA AMP PROB: CPT

## 2022-08-29 PROCEDURE — 93306 TTE W/DOPPLER COMPLETE: CPT

## 2022-08-29 RX ORDER — ALBUTEROL SULFATE 90 UG/1
2 AEROSOL, METERED RESPIRATORY (INHALATION) EVERY 6 HOURS PRN
Qty: 18 G | Refills: 3 | Status: SHIPPED | OUTPATIENT
Start: 2022-08-29 | End: 2022-11-03

## 2022-08-29 RX ADMIN — ACETAMINOPHEN 650 MG: 325 TABLET ORAL at 09:15

## 2022-08-29 RX ADMIN — LEVOTHYROXINE SODIUM 50 MCG: 50 TABLET ORAL at 06:05

## 2022-08-29 RX ADMIN — PANTOPRAZOLE SODIUM 40 MG: 40 TABLET, DELAYED RELEASE ORAL at 06:05

## 2022-08-29 RX ADMIN — SODIUM CHLORIDE, PRESERVATIVE FREE 10 ML: 5 INJECTION INTRAVENOUS at 13:45

## 2022-08-29 NOTE — PLAN OF CARE
Problem: Discharge Planning  Goal: Discharge to home or other facility with appropriate resources  8/29/2022 0400 by Rafat Oneill RN  Outcome: Progressing  8/28/2022 1835 by Lita Kaplan RN  Flowsheets (Taken 8/28/2022 1834)  Discharge to home or other facility with appropriate resources: Identify barriers to discharge with patient and caregiver  Note: Patient will discharge home when medically stable. Problem: Safety - Adult  Goal: Free from fall injury  8/29/2022 0400 by Rafat Oneill RN  Outcome: Progressing  Note: Patient uses call light for all needs. Overbed table within reach. Call light within reach. Hourly rounds complete and needs are made known during rounds. 8/28/2022 1835 by Lita Kaplan RN  Note: Call light in reach. Bed wheels locked. Bed alarm on. Patient calls out appropriately. Problem: Respiratory - Adult  Goal: Achieves optimal ventilation and oxygenation  Outcome: Progressing  Note: No issues noted this shift. Problem: Cardiovascular - Adult  Goal: Maintains optimal cardiac output and hemodynamic stability  Outcome: Progressing  Note: No issues reported this shift. Goal: Absence of cardiac dysrhythmias or at baseline  Outcome: Progressing  Note: No issues this shift.

## 2022-08-29 NOTE — CARE COORDINATION
8/29/22, 10:23 AM EDT  DISCHARGE PLANNING EVALUATION:    Avis Christopher       Admitted: 8/28/2022/ Jose 688 day: 0   Location: [address] [place] [address] Reason for admit: Dyspnea [R06.00]  Dyspnea on exertion [R06.00]  Microscopic hematuria [R31.29]  Left flank pain [R10.9]   PMH:  has a past medical history of Arthritis of left knee, Asthma, and Hypothyroidism. Procedure: No.   Barriers to Discharge: To ER with SOB and hematuria. Echo ordered. Afebrile and on room air. IV Levaquin for possible atypical pneumonia. PCP: JANEY Cobb CNP    Patient's Healthcare Decision Maker: Named in [address] [place] [address]    Patient Goals/Plan/Treatment Preferences: Met with pt today. She is from home with spouse. A dtr is present. Pt has no home services or DME. She is self sufficient typically, she drives she is insured, no issues getting meds and she has a PCP. Pt dtr speaks up during pt interview with concerns that pt was told she is being discharged again without answers regarding her fatigue and SOB that has been going on for 2 months. She states, this is not how my mother is. Dtr states pt was recently sent to ER per her PCP and then sent home from ER without answers. Dtr states they did make a call to Patient Relations regarding this. I advised pt that there is some mention of possibly an atypical pneumonia that could contribute to SOB, however dtr again states this has been going on for 2 months. Spoke with Jose M Hill RN for pt and requested she ambulate pt around the unit with O2 monitor. Jose M Hill ambulated pt and reports pt was coughing much of the time, during ambulation with PO of 92%. Jose M Hill reports pt was very weak and fatigued with ambulation. Spoke with Dr. Demetria Crocker regarding above concerns and pt/family feeling the need for further workup and answers. Dr. Demetria Crocker states he will discuss with team and assess situation. 1310 Dr. Demetria Crocker to unit to see pt. Pulmonary consult placed. Transportation/Food Security/Housekeeping Addressed:  No issues identified. 8/29/22, 4:11 PM EDT    Patient goals/plan/ treatment preferences discussed by  and . Patient goals/plan/ treatment preferences reviewed with patient/ family. Patient/ family verbalize understanding of discharge plan and are in agreement with goal/plan/treatment preferences. Understanding was demonstrated using the teach back method. AVS provided by RN at time of discharge, which includes all necessary medical information pertaining to the patients current course of illness, treatment, post-discharge goals of care, and treatment preferences. Services At/After Discharge: None           Discharge order in place. Pt to have OP follow up with Pulmonology post discharge. Pt plans return home with spouse.

## 2022-08-29 NOTE — DISCHARGE SUMMARY
Internal Medicine Resident Discharge Summary      Patient Identification:   Asher Bland   : 1955  MRN: [id number]   Account: [de-identified]      Patient's PCP: JANEY Garibay CNP    Admit Date: 2022     Discharge Date:   2022    Admitting Physician: Negrito Huggins DO     Discharge Physician: Kalen Mcarthur DO       Hospital Course:   Asher Bland is a 74 y.o. female with PMHx  asthma, hypothyroidism, HTN, and previous nephrolithiasis (>1 year ago) admitted to [facility] on 2022 for hematuria. Patient reported chronically stable SOB for 1 year; initially seen by PCP and diagnosed w/ iron deficiency anemia. Unresolved on oral iron supplements and referred to Dr. Mercy Miranda from [facility] ED for transfusions 2022. She had been getting transfusions w/o any improvement in dyspnea. She had been following w/ GI, endoscopy and colonoscopy (2022) notable for hiatal hernia and bleeding site at hiatal hernia. Biopsied and sent for testing, appointment for 2022 scheduled. Endorsed that she was ambulatory w/ full ADLs. 2022, patient had episode of hematuria w/ left back pain which has since resolved. Also endorsed productive cough w/ green sputum starting then; associated bilateral chest pain during coughing episodes which does not improve w/ rest or worsen on exertion. Left lung fields w/ inspiratory and expiratory wheezing w/o improvements in forced expiration. Denied fevers, chills, night sweats, headaches, SOB, N/V/C/D, abdominal pain, alterations in appetite or weight. Of note, her previous kidney stones were all passed w/o needing intervention. Vitals stable in ED, afebrile. CMP unremarkable, TSH, BNP, trop WNL. LA 1.2, Procal 0. 08, WBC 4.6, Hgb 9.7. UA negative for UTI, notable for 5-10 RBC. CXR w/o acute processes. CT A/P  notable for constipation, hiatal hernia, and 2mm non-obstructive stones in left kidney.  EKG w/ T wave inversion in anterior leads and T wave flattening in inferior leads unchanged from prior; no acute ischemic changes noted. Admitted to observation w/ telemetry monitoring for management. BLE US Doppler w/o signs of VTE. TTE notable for EF 60% w/ normal ventricular function. Coughing improved, continued dyspnea on exertion on RA. Patient reported tarry stool during stay; endorsed history of chronic tarry stool (2-3 times a week) for about 6 months. No new symptoms of dyschezia, N/V/C/D, hematochezia, fevers, or chills. Work-up suggested viral rather than bacterial pneumonia; discontinued Levofloxacin. Pulmonology consulted and discussed OP work-up options w/ patient and family. Patient's family concerned about her progressing weakness over the last several months; discussed w/ patient and family regarding OP work-up that is currently being completed. Discharged home w/ follow up scheduled w/ PCP, Pulmonology, Cardiology, Heme/Onc, and Urology. Dyspnea on Exertion  Likely multifactorial from obstructive lung disease (PFTs 2015) and viral pneumonia. Discussed supportive treatment w/ fluid hydration and rest w/ patient. Obstructive disease may have progressed and contributed to JOSEPH as she was not following up w/ any doctor. Pulm consulted and discussed OP work-up and treatment options. Patient agreed and discharged w/ OP pulmonology follow-up. Prescribed Albuterol inhaler and ordered PFTs to be completed at [facility] clinic. CAP  Hx asthma, PFTs (2015) notable for obstructive process. JOSEPH, Afebrile w/o leukocytosis. LA WNL. Productive cough w/ green sputum. CXR w/o acute processes. Left lung fields w/ inspiratory and expiratory wheezing w/o improvements in forced expiration. Cannot r/o atypical pneumonia vs viral pneumonia. Levofloxacin IV (8/28). Procal 0.08, likely viral etiology. Discontinued Levofloxacin. Tessalon pearls prn. Discussed supportive treatment w/ fluid hydration and rest w/ patient.       Atypical CP, improved  Bilateral anterior chest wall pain associated w/ coughs. Does not worsen w/ exertion, does not relieve w/ rest. Dyspnea on exertion. BNP and trops WNL. TSH WNL. EKG w/ T wave inversion in anterior leads and T wave flattening in inferior leads unchanged from prior; no acute ischemic changes noted. HEART score 2; Low suspicion for cardiac etiology in setting of chronic EKG T wave abnormalities. Echo notable for EF 60% w/o ventricular wall abnormalities. 4PEPS score 1; Low suspicion for PE. US Doppler BLE negative for VTE. CP improved w/ treatment of cough. Follow-up OP w/ Dr. Desmond Houston who she saw in the past.     Iron Deficiency Anemia  Diagnosed 6/30/2022. Hgb 9.7, likely at baseline. Received infusions from Dr. Nixon Walsh OP. GI work-up OP w/ endoscopy and colonoscopy notable for hiatal hernia w/ noted bleeding site; biopsies obtained and sent for testing. Obtained Iron Panel, Retic, B12&Folate for further evaluation; results consistent w/ SANTANA. Discharged w/ instructions to continued follow-up and treatment w/ Dr. Nixon Walsh. Nephrolithiasis, resolved  Hx Nephrolithiasis. Back pain w/ hematuria; has resolved upon ED admit. Afebrile, no leukocytosis. CT A/P notable for 2mm non-obstructive stones in left kidney. She passed previous stones w/o needing intervention; likely has passed this stone already. Pain management prn. Continued to monitor. Hematuria never returned during hospitalization. Follow-up w/ Urology OP. HTN: held home Valsartan-HCTZ in setting of soft BPs on admit. Asthma: PFTs 2015 notable for obstructive processes, improved w/ bronchodilators. currently not on any home medications. Follow-up w/ pulm OP for PFTs and further work up. Hypothyroidism: continued home Synthroid. Anxiety/Depression: continued home Celexa. Hiatal Hernia: followed w/ GI OP. S/p endoscopy and colonoscopy (7/2022). Continued home Protonix. Follow-up w/ GI OP 9/2022.   Hx Nephrolithiasis: noted, last episode >1 years Capillary Refill: Brisk,< 3 seconds. Peripheral Pulses: +2 palpable, equal bilaterally. Labs: For convenience and continuity at follow-up the following most recent labs are provided:    CBC:    Lab Results   Component Value Date/Time    WBC 4.2 08/29/2022 06:38 AM    HGB 9.8 08/29/2022 04:47 PM    HCT 34.1 08/29/2022 04:47 PM     08/29/2022 06:38 AM       Renal:    Lab Results   Component Value Date/Time     08/29/2022 06:38 AM    K 5.0 08/29/2022 06:38 AM    K 4.0 08/28/2022 11:50 AM     08/29/2022 06:38 AM    CO2 25 08/29/2022 06:38 AM    BUN 17 08/29/2022 06:38 AM    CREATININE 0.8 08/29/2022 06:38 AM    CALCIUM 9.1 08/29/2022 06:38 AM         Significant Diagnostic Studies    Radiology:   VL DUP LOWER EXTREMITY VENOUS BILATERAL   Final Result   No evidence of a DVT. **This report has been created using voice recognition software. It may contain minor errors which are inherent in voice recognition technology. **      Final report electronically signed by Dr. Alex Steward on 8/29/2022 8:23 AM      CT ABDOMEN PELVIS W IV CONTRAST Additional Contrast? None   Final Result   1. Diverticulosis coli. Findings of constipation. 2. Moderate-sized hiatus hernia. 3. No acute findings in the abdomen or pelvis. .            **This report has been created using voice recognition software. It may contain minor errors which are inherent in voice recognition technology. **      Final report electronically signed by Dr. Shruti Sylvester on 8/28/2022 3:12 PM      XR CHEST (2 VW)   Final Result   1. Borderline heart size. Small hiatus hernia. 2. No acute findings. No infiltrates or effusions are seen. **This report has been created using voice recognition software. It may contain minor errors which are inherent in voice recognition technology. **      Final report electronically signed by Dr. Shruti Sylvester on 8/28/2022 12:58 PM             Consults:     IP CONSULT TO PULMONOLOGY    Disposition: Home  Condition at Discharge: Stable    Code Status:  Full Code     Patient Instructions:    Discharge lab work: None  Activity: activity as tolerated  Diet: ADULT DIET; Regular      Follow-up visits:   Lanita Paget, APRN - CNP  [address] [facility]  [phone]    Follow up on 9/6/2022  Follow up appointment September 6, 2022 at [address] [facility]. [facility]  [address] [facility].  [address] [facility]  Follow up in 1 week(s)  Office will contact patient for a new patient appointment. [address] [facility], [address] [facility]  [address] [facility]  [address]     Call in 2 week(s)  follow-up for Tom Santana  [address] [facility] [address] [phone]  Schedule an appointment as soon as possible for a visit in 2 week(s)  PFTs 2015 noted for obstructive disease. follow-up for PFTs and work-up in setting of JOSEPH. [address] [address] [phone]    Call in 2 week(s)  follow-up for SANTANA.          Discharge Medications:        Medication List        START taking these medications      albuterol sulfate  (90 Base) MCG/ACT inhaler  Commonly known as: Proventil HFA  Inhale 2 puffs into the lungs every 6 hours as needed for Wheezing            CONTINUE taking these medications      citalopram 40 MG tablet  Commonly known as: CeleXA  Take 1 tablet by mouth daily     levothyroxine 50 MCG tablet  Commonly known as: SYNTHROID  take 1 tablet by mouth once daily     pantoprazole 40 MG tablet  Commonly known as: PROTONIX     Potassium 99 MG Tabs     valACYclovir 500 MG tablet  Commonly known as: VALTREX  Take 1 tablet by mouth daily     valsartan-hydroCHLOROthiazide 160-12.5 MG per tablet  Commonly known as: DIOVAN-HCT  Take 1 tablet by mouth daily               Where to Get Your Medications        These medications were sent to [facility] [address]  [address], [place] [address]      Phone: [phone]   albuterol sulfate  (90 Base) MCG/ACT inhaler            Time Spent on discharge is 60 minutes in the examination, evaluation, counseling and review of medications and discharge plan. Thank you JANEY Nava CNP for the opportunity to be involved in this patient's care.       Signed:    Electronically signed by Lars Almanzar DO on 8/29/22 at 5:11 PM EDT     Case was discussed with Attending, Dr. Una Sanford MD

## 2022-08-29 NOTE — PLAN OF CARE
Problem: Discharge Planning  Goal: Discharge to home or other facility with appropriate resources  8/29/2022 1731 by Monica France RN  Outcome: Completed  8/29/2022 0400 by Rima Rios RN  Outcome: Progressing     Problem: Safety - Adult  Goal: Free from fall injury  8/29/2022 1731 by Monica France RN  Outcome: Completed  8/29/2022 0400 by Rima Rios RN  Outcome: Progressing  Note: Patient uses call light for all needs. Overbed table within reach. Call light within reach. Hourly rounds complete and needs are made known during rounds. Problem: Respiratory - Adult  Goal: Achieves optimal ventilation and oxygenation  8/29/2022 1731 by Monica France RN  Outcome: Completed  8/29/2022 0400 by Rima Rios RN  Outcome: Progressing  Note: No issues noted this shift. Problem: Cardiovascular - Adult  Goal: Maintains optimal cardiac output and hemodynamic stability  8/29/2022 1731 by Monica France RN  Outcome: Completed  8/29/2022 0400 by Rima Rios RN  Outcome: Progressing  Note: No issues reported this shift. Goal: Absence of cardiac dysrhythmias or at baseline  8/29/2022 1731 by Monica France RN  Outcome: Completed  8/29/2022 0400 by Rima Rios RN  Outcome: Progressing  Note: No issues this shift.

## 2022-08-30 ENCOUNTER — TELEPHONE (OUTPATIENT)
Dept: PULMONOLOGY | Age: 67
End: 2022-08-30

## 2022-08-30 PROBLEM — R31.29 MICROSCOPIC HEMATURIA: Status: ACTIVE | Noted: 2022-08-30

## 2022-08-30 PROBLEM — D50.0 CHRONIC BLOOD LOSS ANEMIA: Status: ACTIVE | Noted: 2022-08-30

## 2022-08-30 NOTE — PROGRESS NOTES
Patient with discharge order. All instructions for care at home, including medications, diet, and ff-up appointments were explained in detail to the patient and then presented with written form to take home with.      Patient left the facility at 6:05 PM.

## 2022-08-30 NOTE — TELEPHONE ENCOUNTER
Patient called to schedule her hospital fup and wants to be seen asap which we would need to see her in 3 months not 1-2 wks. I advised her that Dr. Royal Veronika roth had stated in hospital for 1-2 wks which she need to get pft done and and schedule with us in three month I spoke to Dr. Niki Alegria as well. I explain to patient that and she didn't understand but I did transfer her to central scheduling to get pft set up in three moths and she can call back to schedule follow up.

## 2022-08-31 LAB
GRAM STAIN RESULT: NORMAL
RESPIRATORY CULTURE: NORMAL

## 2022-09-06 ENCOUNTER — OFFICE VISIT (OUTPATIENT)
Dept: CARDIOLOGY CLINIC | Age: 67
End: 2022-09-06
Payer: COMMERCIAL

## 2022-09-06 VITALS
DIASTOLIC BLOOD PRESSURE: 82 MMHG | WEIGHT: 204.8 LBS | HEIGHT: 67 IN | HEART RATE: 88 BPM | SYSTOLIC BLOOD PRESSURE: 164 MMHG | BODY MASS INDEX: 32.15 KG/M2

## 2022-09-06 DIAGNOSIS — R06.02 SOB (SHORTNESS OF BREATH): ICD-10-CM

## 2022-09-06 DIAGNOSIS — I20.8 ANGINAL EQUIVALENT (HCC): Primary | ICD-10-CM

## 2022-09-06 DIAGNOSIS — R53.83 FATIGUE, UNSPECIFIED TYPE: ICD-10-CM

## 2022-09-06 PROCEDURE — 1123F ACP DISCUSS/DSCN MKR DOCD: CPT | Performed by: INTERNAL MEDICINE

## 2022-09-06 PROCEDURE — G8427 DOCREV CUR MEDS BY ELIG CLIN: HCPCS | Performed by: INTERNAL MEDICINE

## 2022-09-06 PROCEDURE — 1036F TOBACCO NON-USER: CPT | Performed by: INTERNAL MEDICINE

## 2022-09-06 PROCEDURE — G8400 PT W/DXA NO RESULTS DOC: HCPCS | Performed by: INTERNAL MEDICINE

## 2022-09-06 PROCEDURE — 99204 OFFICE O/P NEW MOD 45 MIN: CPT | Performed by: INTERNAL MEDICINE

## 2022-09-06 PROCEDURE — G8417 CALC BMI ABV UP PARAM F/U: HCPCS | Performed by: INTERNAL MEDICINE

## 2022-09-06 PROCEDURE — 1090F PRES/ABSN URINE INCON ASSESS: CPT | Performed by: INTERNAL MEDICINE

## 2022-09-06 PROCEDURE — 3017F COLORECTAL CA SCREEN DOC REV: CPT | Performed by: INTERNAL MEDICINE

## 2022-09-06 NOTE — PROGRESS NOTES
[address] [facility] [place] [address] [place] [address]  Dept: [phone]  Dept Fax: [phone]  Loc: [phone]    Visit Date: 9/6/2022    Ms. Benjamin Celis is a 74 y.o. female  who presented for:  Chief Complaint   Patient presents with    Establish Cardiologist    New Patient    Check-Up       HPI:   75 yo F c hx of hypothyrodism, SANTANA (Hb 9.8/34) is here for evaluation of shortness of breath and fatigue. Patient has been following up with hematologist/gastroenterologist and has been getting blood transfusion and iron infusion. EKG shows SR, NSST. Echo recently showed EF 60%, no WMA. Current Outpatient Medications:     albuterol sulfate HFA (PROVENTIL HFA) 108 (90 Base) MCG/ACT inhaler, Inhale 2 puffs into the lungs every 6 hours as needed for Wheezing, Disp: 18 g, Rfl: 3    pantoprazole (PROTONIX) 40 MG tablet, Take 40 mg by mouth 2 times daily, Disp: , Rfl:     levothyroxine (SYNTHROID) 50 MCG tablet, take 1 tablet by mouth once daily, Disp: 30 tablet, Rfl: 11    valACYclovir (VALTREX) 500 MG tablet, Take 1 tablet by mouth daily, Disp: 30 tablet, Rfl: 11    valsartan-hydroCHLOROthiazide (DIOVAN-HCT) 160-12.5 MG per tablet, Take 1 tablet by mouth daily, Disp: 30 tablet, Rfl: 11    citalopram (CELEXA) 40 MG tablet, Take 1 tablet by mouth daily, Disp: 30 tablet, Rfl: 11    Past Medical History  Reinaldo Miller  has a past medical history of Arthritis of left knee, Asthma, and Hypothyroidism. Social History  Reinaldo Miller  reports that she has never smoked. She has never used smokeless tobacco. She reports current alcohol use. She reports that she does not use drugs. Family History  Sofya family history includes Arthritis in her mother; Birth Defects in her brother and father; Breast Cancer (age of onset: 64) in her maternal cousin; Breast Cancer (age of onset: 80) in her mother; Diabetes in her maternal aunt;  High Blood Pressure in her mother. Past Surgical History   Past Surgical History:   Procedure Laterality Date    ANKLE ARTHROSCOPY Left 05/15/2017    COLONOSCOPY  08/23/2022    ENDOSCOPY, COLON, DIAGNOSTIC  08/09/2022    HYSTERECTOMY (CERVIX STATUS UNKNOWN)      THYROID SURGERY  01/05/2011    TONSILLECTOMY         Subjective:     REVIEW OF SYSTEMS  Constitutional: denies sweats, chills and fever  HENT: denies  congestion, sinus pressure, sneezing and sore throat. Eyes: denies  pain, discharge, redness and itching. Respiratory: denies apnea, cough  Gastrointestinal: denies blood in stool, constipation, diarrhea   Endocrine: denies cold intolerance, heat intolerance, polydipsia. Genitourinary: denies dysuria, enuresis, flank pain and hematuria. Musculoskeletal: denies arthralgias, joint swelling and neck pain. Neurological: denies numbness and headaches. Psychiatric/Behavioral: denies agitation, confusion, decreased concentration and dysphoric mood    All others reviewed and are negative. Objective:     BP (!) 164/82   Pulse 88   Ht 5' 7\" (1.702 m)   Wt 204 lb 12.8 oz (92.9 kg)   BMI 32.08 kg/m²     Wt Readings from Last 3 Encounters:   09/06/22 204 lb 12.8 oz (92.9 kg)   08/28/22 202 lb 11.2 oz (91.9 kg)   06/30/22 200 lb (90.7 kg)     BP Readings from Last 3 Encounters:   09/06/22 (!) 164/82   08/29/22 113/66   08/05/22 134/67       PHYSICAL EXAM  Constitutional: Oriented to person, place, and time. Appears well-developed and well-nourished. HENT:   Head: Normocephalic and atraumatic. Eyes: EOM are normal. Pupils are equal, round, and reactive to light. Neck: Normal range of motion. Neck supple. No JVD present. Cardiovascular: Normal rate , normal heart sounds and intact distal pulses. Pulmonary/Chest: Effort normal and breath sounds normal. No respiratory distress. No wheezes. No rales. Abdominal: Soft. Bowel sounds are normal. No distension. There is no tenderness. Musculoskeletal: Normal range of motion. No edema. Neurological: Alert and oriented to person, place, and time. No cranial nerve deficit. Coordination normal.   Skin: Skin is warm and dry. Psychiatric: Normal mood and affect.        No results found for: CKTOTAL, CKMB, CKMBINDEX    Lab Results   Component Value Date/Time    WBC 4.2 08/29/2022 06:38 AM    RBC 3.70 08/29/2022 06:38 AM    HGB 9.8 08/29/2022 04:47 PM    HCT 34.1 08/29/2022 04:47 PM    MCV 88.1 08/29/2022 06:38 AM    MCH 24.9 08/29/2022 06:38 AM    MCHC 28.2 08/29/2022 06:38 AM    RDW 12.7 05/05/2017 04:25 PM     08/29/2022 06:38 AM    MPV 10.9 08/29/2022 06:38 AM       Lab Results   Component Value Date/Time     08/29/2022 06:38 AM    K 5.0 08/29/2022 06:38 AM    K 4.0 08/28/2022 11:50 AM     08/29/2022 06:38 AM    CO2 25 08/29/2022 06:38 AM    BUN 17 08/29/2022 06:38 AM    LABALBU 4.1 08/28/2022 11:50 AM    LABALBU 4.2 08/28/2022 11:50 AM    CREATININE 0.8 08/29/2022 06:38 AM    CALCIUM 9.1 08/29/2022 06:38 AM    LABGLOM 72 08/29/2022 06:38 AM    GLUCOSE 101 08/29/2022 06:38 AM    GLUCOSE 93 11/11/2011 11:20 AM       Lab Results   Component Value Date/Time    ALKPHOS 91 08/28/2022 11:50 AM    ALKPHOS 90 08/28/2022 11:50 AM    ALT 16 08/28/2022 11:50 AM    ALT 14 08/28/2022 11:50 AM    AST 17 08/28/2022 11:50 AM    AST 16 08/28/2022 11:50 AM    PROT 6.4 08/28/2022 11:50 AM    PROT 6.5 08/28/2022 11:50 AM    BILITOT 0.3 08/28/2022 11:50 AM    BILITOT 0.2 08/28/2022 11:50 AM    BILIDIR <0.2 08/28/2022 11:50 AM    LABALBU 4.1 08/28/2022 11:50 AM    LABALBU 4.2 08/28/2022 11:50 AM       Lab Results   Component Value Date/Time    MG 2.3 05/27/2013 01:00 PM       Lab Results   Component Value Date    INR 0.98 06/30/2022    INR 1.06 09/29/2016         No results found for: LABA1C    Lab Results   Component Value Date/Time    TRIG 72 03/04/2022 10:05 AM    HDL 76 03/04/2022 10:05 AM    LDLCALC 112 03/04/2022 10:05 AM       Lab Results   Component Value Date/Time    TSH 1.050 08/28/2022 11:50 AM         Testing Reviewed:      I haveindividually reviewed the below cardiac tests    EKG:    ECHO: Results for orders placed during the hospital encounter of 08/28/22    ECHO Complete 2D W Doppler W Color    Narrative  Transthoracic Echocardiography Report (TTE)    Demographics    Patient Name   [facility] Gender              Female  BRITTANI    MR #           [id number]       Race                    Ethnicity    Account #      [de-identified]       Room Number         1004    Accession      [id number]      Date of Study       08/29/2022  Number    Date of Birth  1955      Referring Physician Flor Davis DO  [address],  [facility]    Age            74 year(s)      [address] [facility]    Interpreting        Echo reader of the week  Physician           Carlos Mcarthur MD    Procedure    Type of Study    TTE procedure:ECHOCARDIOGRAM COMPLETE 2D W DOPPLER W COLOR. Procedure Date  Date: 08/29/2022 Start: 08:53 AM    Study Location: Bedside  Technical Quality: Adequate visualization    Indications:Dyspnea on exertion and Lower extremity edema. Additional Medical History:Dyspnea, anemia, anxiety, hypothyroidism    Patient Status: Routine    Height: 67 inches Weight: 202 pounds BSA: 2.03 m^2 BMI: 31.64 kg/m^2    BP: 130/60 mmHg    Conclusions    Summary  Ejection fraction is visually estimated at 60%. Overall left ventricular function is normal.    Signature    ----------------------------------------------------------------  Electronically signed by Carlos Mcarthur MD (Interpreting  physician) on 08/29/2022 at 03:59 PM  ----------------------------------------------------------------    Findings    Mitral Valve  Trace mitral regurgitation is present. Aortic Valve  The aortic valve was trileaflet with normal thickness and cuspal  separation. DOPPLER: Transaortic velocity was within the normal range with  no evidence of aortic stenosis.  There was no evidence of aortic  regurgitation. Tricuspid Valve  Trivial tricuspid regurgitation visualized. Pulmonic Valve  The pulmonic valve was not well visualized . Trivial pulmonic regurgitation visualized. Left Atrium  Left atrial size was normal.    Left Ventricle  Ejection fraction is visually estimated at 60%. Overall left ventricular function is normal.    Right Atrium  Right atrial size was normal.    Right Ventricle  The right ventricular size was normal with normal systolic function and  wall thickness. Pericardial Effusion  The pericardium was normal in appearance with no evidence of a pericardial  effusion. Pleural Effusion  No evidence of pleural effusion. Aorta / Great Vessels  -Aortic root dimension within normal limits.  -The Pulmonary artery is within normal limits. -IVC size is within normal limits with normal respiratory phasic changes.     M-Mode/2D Measurements & Calculations    LV Diastolic   LV Systolic Dimension:    AV Cusp Separation: 1.8 cmLA  Dimension: 4.9 3.4 cm                    Dimension: 4.7 cmAO Root  cm             LV Volume Diastolic: 844  Dimension: 2.8 cmLA Area: 17.2  LV FS:30.6 %   ml                        cm^2  LV PW          LV Volume Systolic: 11.6  Diastolic: 0.9 ml  cm             LV EDV/LV EDV Index: 113  Septum         ml/56 m^2LV ESV/LV ESV    RV Diastolic Dimension: 2.3 cm  Diastolic: 1   Index: 49.8 ml/23 m^2  cm             EF Calculated: 58.1 %     LA/Aorta: 1.68    LA volume/Index: 49.1 ml /24m^2    Doppler Measurements & Calculations    MV Peak E-Wave: 86.1 cm/s  AV Peak Velocity: 150 LVOT Peak Velocity: 130  MV Peak A-Wave: 107 cm/s   cm/s                  cm/s  MV E/A Ratio: 0.8          AV Peak Gradient: 9   LVOT Peak Gradient: 7  MV Peak Gradient: 2.97     mmHg                  mmHg  mmHg  TV Peak E-Wave: 49.7 cm/s  MV Deceleration Time: 194                        TV Peak A-Wave: 39 cm/s  msec  MV P1/2t: 57 msec          IVRT: 85 msec         TV Peak Gradient: 0.99  MVA by PHT:3.86 cm^2                             mmHg  TR Velocity:203 cm/s  MV E' Septal Velocity: 5.4 AV DVI (Vmax):0.87    TR Gradient:16.48 mmHg  cm/s                                             PV Peak Velocity: 65.6  MV A' Septal Velocity: 7.4                       cm/s  cm/s                                             PV Peak Gradient: 1.72  MV E' Lateral Velocity:                          mmHg  7.8 cm/s  MV A' Lateral Velocity:  13.7 cm/s                                        SD ED Velocity: 83.8 cm/s  E/E' septal: 15.94  E/E' lateral: 11.04  MR Velocity: 346 cm/s    [email or web address]? [id number]%[id number]      STRESS:    CATH:    Assessment/Plan       Diagnosis Orders   1. Anginal equivalent (HCC)          Shortness of breath/fatigue, dyspnea on exertion, ?angina eqivalent  SANTANA  Abnormal EKG    EKG with nonspecific ST-T changes  Has fatigue and shortness of breath and dyspnea on exertion  Had already been evaluated by hem and GI  Discussed EKG, Echo  Will get Lexiscan stress test  Refer to sleep clinic  The patient is asked to make an attempt to improve diet and exercise patterns to aid in medical management of this problem. Advised more plant based nutrition/meditarrean diet   Advised patient to call office or seek immediate medical attention if there is any new onset of  any chest pain, sob, palpitations, lightheadedness, dizziness, orthopnea, PND or pedal edema. All medication side effects were discussed in details. Thank youfor allowing me to participate in the care of this patient. Please do not hesitate to contact me for any further questions. Return in about 6 weeks (around 10/18/2022).        Electronically signed by Rebecca Rey MD [facility]  9/6/2022 at 8:42 AM EDT

## 2022-09-12 ENCOUNTER — TELEPHONE (OUTPATIENT)
Dept: UROLOGY | Age: 67
End: 2022-09-12

## 2022-09-12 ENCOUNTER — TELEPHONE (OUTPATIENT)
Dept: CARDIOLOGY CLINIC | Age: 67
End: 2022-09-12

## 2022-09-12 DIAGNOSIS — R06.02 SOB (SHORTNESS OF BREATH): Primary | ICD-10-CM

## 2022-09-12 DIAGNOSIS — I20.8 ANGINAL EQUIVALENT (HCC): ICD-10-CM

## 2022-09-12 DIAGNOSIS — R53.83 FATIGUE, UNSPECIFIED TYPE: ICD-10-CM

## 2022-09-12 NOTE — TELEPHONE ENCOUNTER
Conni Councilman completed Peer to Peer. Steven Mendez denied. Pt notified. Exercise stress ordered and given to scheduling.

## 2022-09-12 NOTE — TELEPHONE ENCOUNTER
Peer to peer scheduled for today at 1430. Umu Jimenez will receive call at [phone] at 1430. Reviewing Physician will Chris Pike. 15 minute leana period around the scheduled time.

## 2022-09-12 NOTE — TELEPHONE ENCOUNTER
Per Trish Bradley,  The NM stress test ordered for this patient is pending denial for a peer to peer as the clinical information does not support medical necessity for the ordered test. All available clinical has been submitted at this time. The denial reasoning has been uploaded into the media tab for you review. Peer to peer / Appeal information in Denial letter  Can try Andrade Rodríguez @ [phone]  Case# [id number]    Please advise.

## 2022-09-12 NOTE — TELEPHONE ENCOUNTER
Luis Yoon is calling to get a NP appt scheduled with any doctor, she is having blood in her urine, and lower back, thinks it is due to kidney stones. Her DC notes from 08-28 states Follow up with [address] uro in 1 wk, 09-05, office to contact patient to schedule an appt, Please call her to get an appt scheduled.

## 2022-09-12 NOTE — TELEPHONE ENCOUNTER
Insurance wanting regular exercise stress test. Spoke to patient, she states she won't be able to walk on treadmill bc \"I'm so short of breath. I won't be able to do it, I can't catch my breath when I'm up and moving around. \"  Nadine or Davion-pt is scheduled on 9/14 for Steven Mendez. Would either of you be willing to assist with a peer to peer? If so, I can call and schedule for you.

## 2022-09-14 ENCOUNTER — TELEMEDICINE (OUTPATIENT)
Dept: UROLOGY | Age: 67
End: 2022-09-14
Payer: COMMERCIAL

## 2022-09-14 ENCOUNTER — HOSPITAL ENCOUNTER (OUTPATIENT)
Dept: NON INVASIVE DIAGNOSTICS | Age: 67
Discharge: HOME | End: 2022-09-14
Payer: COMMERCIAL

## 2022-09-14 VITALS — BODY MASS INDEX: 31.71 KG/M2 | WEIGHT: 202 LBS | HEIGHT: 67 IN

## 2022-09-14 DIAGNOSIS — N20.0 NEPHROLITHIASIS: ICD-10-CM

## 2022-09-14 DIAGNOSIS — I20.8 ANGINAL EQUIVALENT (HCC): ICD-10-CM

## 2022-09-14 DIAGNOSIS — D17.9 ANGIOMYOLIPOMA: ICD-10-CM

## 2022-09-14 DIAGNOSIS — R31.0 GROSS HEMATURIA: ICD-10-CM

## 2022-09-14 DIAGNOSIS — N28.1 RENAL CYST: Primary | ICD-10-CM

## 2022-09-14 DIAGNOSIS — R06.02 SOB (SHORTNESS OF BREATH): ICD-10-CM

## 2022-09-14 DIAGNOSIS — R53.83 FATIGUE, UNSPECIFIED TYPE: ICD-10-CM

## 2022-09-14 PROCEDURE — G8400 PT W/DXA NO RESULTS DOC: HCPCS | Performed by: UROLOGY

## 2022-09-14 PROCEDURE — G8427 DOCREV CUR MEDS BY ELIG CLIN: HCPCS | Performed by: UROLOGY

## 2022-09-14 PROCEDURE — 93017 CV STRESS TEST TRACING ONLY: CPT | Performed by: INTERNAL MEDICINE

## 2022-09-14 PROCEDURE — 99204 OFFICE O/P NEW MOD 45 MIN: CPT | Performed by: UROLOGY

## 2022-09-14 PROCEDURE — 1090F PRES/ABSN URINE INCON ASSESS: CPT | Performed by: UROLOGY

## 2022-09-14 PROCEDURE — 1123F ACP DISCUSS/DSCN MKR DOCD: CPT | Performed by: UROLOGY

## 2022-09-14 PROCEDURE — 3017F COLORECTAL CA SCREEN DOC REV: CPT | Performed by: UROLOGY

## 2022-09-14 NOTE — PROGRESS NOTES
[facility]    Patient: Gregory Ceballos, 74 y.o.   : 1955         Subjective     Chief Complaint   Patient presents with    New Patient     Consult seen Dr. Jose Martinez inpatient ref for SOB. LM Agustin is here for evaluation of shortness of breath with referral from Dr. Jose Martinez. Patient reports that shortness of breath has been ongoing for 9 months. Patient was hospitalized on 2022. During her hospitalization, she reported chronically stable shortness of breath for 1 year and was initially diagnosed by her primary care provider with iron deficiency anemia. She has been getting iron infusions without any improvement in her dyspnea. She also reported productive cough with green sputum production. She was diagnosed with community-acquired pneumonia while inpatient and received levofloxacin. Her chest x-ray on 2022 revealed no acute findings. Patient has been evaluated by cardiology and completed a stress test. She was also referred to the sleep clinic, however an appointment was not made. She admits to snoring. Patient reports that she had low iron levels around 2022 and was treated initially by her primary care provider. She now follows with Dr. Mercy Miranda for her SANTANA. She reports she has also had an endoscopic evaluation and was found to have a hiatal hernia with a reported small bleed. She is following with Dr. Davida Horn for kidney stones and has a cystoscopy next week. She reports that she has gained 10 lbs in 2 days and has been having issues with fluid retention. She is following with her primary care provider for this and reports that she is on a BP medication with a diuretic. She has been sleeping in a recliner since foot surgery in April - reports needs knee replacement. Denies chest pain, shoulder pain, neck pain, palpitations, chest tightness, or air hunger. Denies history of allergies.  Denies orthopnea or paroxysmal exertional dyspnea. However she is sleeping in recliner. Denies history of PE or DVT. Patient's past medical history is significant for asthma, arthritis, hypothyroidism, anemia. Never smoker  Reports secondhand smoke from parents growing up. PFT 9/15/2022 - moderate obstruction and mildly decreased DLCO    Patient has not used albuterol since hospitalization. History of COVID-19 about 1 year ago or less - never hospitalized or treated - had mild symptoms      HPI  Onset Duration: about 9 months  Exacerbating factors: exertional tasks, cleaning - has to sit and take breaks, up and down stairs - very hard  Alleviating factors: rest  Timing-exertion, certain times of day: no certain time of day  Associated symptoms: None    Sleeping habits:  Time to go to bed: 12:00 AM  Time to wake up: 6:00 AM    She falls asleep watching TV  No difficulty with falling asleep    Sleep History:  Pt with history of:  Morning headache:No   Dryness of mouth in the morning:No  Hx of snoring:Yes  Witnessed apneas:No  Excessive day time sleepiness:Yes - relates this to low iron levels. See below for Pompey score  Hypnogogic Hallucinations:NO  Hypnopompic Hallucinations:NO  Symptoms suggestive of Restless leg syndrome:NO  History of Seizures:NO  Sleep Walking:NO - not currently, did as a child  Sleep Talking:Yes  Sleep paralysis: NO  Cataplexy: NO      Pompey Sleepiness Score:   Sitting and reading:3  Watching TV:2  Sitting inactive in a public place:0  Being a passenger in a motor vehicle for an hour or more:3 - reports due to car sick  Lying down in the afternoon:2  Sitting and talking to someone:0  Sitting quietly after lunch (no alcohol): 2  Stopped for a few minutes in traffic while drivin  Total SSYWI:76      She is currently working as a: retired. She denies any difficulty in sleeping at new places. Do you drink coffee: No. 0 cup/s per day. Do you drink caffeinated beverages i.e sodas: Yes.  1 can/s per day. Pepsi  Do you drink tea: No.       Do you drink alcoholic beverages:  Yes. 2 or less drinks on the weekend  History of recreational drug use: No.     Mallampati airway Class:III  Neck Circumference:16 Inches    Patient considerations: None    Patient was diagnosed with chronic respiratory condition ie asthma, bronchitis, COPD, or ILD. Patient has been admitted or treated for pneumonia, pulmonary embolism, or respiratory failure. Patient has not been intubated for reasons other than planned procedures. 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 (Coal, dust, asbestos, molds ie Hay)  [place]  Denies exposure to pets/animals at home. Denies exposure to tuberculosis. Admits to hobbies they can no longer perform due to shortness of breath. She is not riding her bike or going on walks anymore due to shortness of breath. Denies history of radiation therapy to the chest  Denies family history of asthma or lung cancer  Her paternal grandfather had COPD - smoker -  at young age  Denies family history of autoimmune conditions, such as RA, SLE, scleroderma, etc.  Her father passed from COPD  She believes her mother may have COPD - not on inhalers anymore.       Pneumonia vaccine: PCV 13 2016  COVID-19 vaccine: vaccinated  Past Medical hx   PMH:  Past Medical History:   Diagnosis Date    Arthritis of left knee 2022    Asthma     Hypothyroidism 2013     SURGICAL HISTORY:  Past Surgical History:   Procedure Laterality Date    ANKLE ARTHROSCOPY Left 05/15/2017    COLONOSCOPY  2022    ENDOSCOPY, COLON, DIAGNOSTIC  2022    HYSTERECTOMY (CERVIX STATUS UNKNOWN)      THYROID SURGERY  2011    TONSILLECTOMY       SOCIAL HISTORY:  Social History     Tobacco Use    Smoking status: Never    Smokeless tobacco: Never   Vaping Use    Vaping Use: Never used   Substance Use Topics    Alcohol use: Yes     Alcohol/week: 0.0 standard drinks     Comment: rarely    Drug use: No     ALLERGIES:  Allergies   Allergen Reactions    Bee Venom Anaphylaxis    Tetracyclines & Related Rash    Zithromax [Azithromycin Dihydrate] Rash    Doxycycline Diarrhea    Effexor [Venlafaxine] Diarrhea    Synthroid [Levothyroxine]     Cyclobenzaprine Rash    Hydrochlorothiazide Rash     FAMILY HISTORY:  Family History   Problem Relation Age of Onset    Arthritis Mother     High Blood Pressure Mother     Breast Cancer Mother 80    Birth Defects Father     Birth Defects Brother     Diabetes Maternal Aunt     Breast Cancer Maternal Cousin 64     CURRENT MEDICATIONS:  Current Outpatient Medications   Medication Sig Dispense Refill    albuterol sulfate HFA (PROVENTIL HFA) 108 (90 Base) MCG/ACT inhaler Inhale 2 puffs into the lungs every 6 hours as needed for Wheezing 18 g 3    pantoprazole (PROTONIX) 40 MG tablet Take 40 mg by mouth 2 times daily      levothyroxine (SYNTHROID) 50 MCG tablet take 1 tablet by mouth once daily 30 tablet 11    valACYclovir (VALTREX) 500 MG tablet Take 1 tablet by mouth daily 30 tablet 11    valsartan-hydroCHLOROthiazide (DIOVAN-HCT) 160-12.5 MG per tablet Take 1 tablet by mouth daily 30 tablet 11    citalopram (CELEXA) 40 MG tablet Take 1 tablet by mouth daily 30 tablet 11     No current facility-administered medications for this visit. Mary Ann DEVRIES   Review of Systems   Constitutional:  Negative for appetite change and fever. HENT:  Negative for congestion, postnasal drip, rhinorrhea, sinus pressure, sinus pain, sneezing and sore throat. Respiratory:  Positive for shortness of breath. Negative for cough, chest tightness and wheezing. Denies hemoptysis   Cardiovascular:  Positive for leg swelling. Negative for chest pain and palpitations.    Gastrointestinal:         Reflux   Allergic/Immunologic: Negative for environmental allergies. Physical exam   /68   Pulse 67   Temp 97.9 °F (36.6 °C)   Ht 5' 6.5\" (1.689 m)   Wt 210 lb (95.3 kg)   SpO2 98% Comment: R/A  BMI 33.39 kg/m²    Wt Readings from Last 3 Encounters:   09/20/22 210 lb (95.3 kg)   09/14/22 202 lb (91.6 kg)   09/06/22 204 lb 12.8 oz (92.9 kg)       Physical Exam  Constitutional:       General: She is not in acute distress. Appearance: She is well-developed. Comments: BMI 33   HENT:      Head: Normocephalic and atraumatic. Right Ear: External ear normal.      Left Ear: External ear normal.      Mouth/Throat:      Mouth: Mucous membranes are moist.      Pharynx: Oropharynx is clear. No oropharyngeal exudate. Eyes:      General:         Right eye: No discharge. Left eye: No discharge. Cardiovascular:      Rate and Rhythm: Normal rate and regular rhythm. Pulmonary:      Effort: Pulmonary effort is normal. No respiratory distress. Breath sounds: No wheezing, rhonchi or rales. Chest:      Chest wall: No tenderness. Musculoskeletal:      Cervical back: Neck supple. Right lower leg: Edema present. Left lower leg: Edema present. Comments: +1-2 in bilateral feet   Skin:     General: Skin is warm and dry. Neurological:      General: No focal deficit present. Mental Status: She is alert. Psychiatric:         Mood and Affect: Mood normal.         Behavior: Behavior normal.         Thought Content: Thought content normal.         Judgment: Judgment normal.        results   Lung Nodule Screening     [] Qualifies    [x] Does not qualify   [] Declined    [] Completed  Non-smoker   The [place]PSTFrecommends annual screening for lung cancer with low-dose computed tomography (LDCT) in adults aged 48 to [de-identified] years who have a 20 pack-year smoking history and currently smoke or have quit within the past 15 years.  Screeningshould be discontinued once a person has not smoked for 15 years or develops a health problem that substantially limits life expectancy or the ability or willingness to have curative lung surgery. Chest x-ray 8/28/22  Narrative   PROCEDURE: XR CHEST (2 VW)       CLINICAL INFORMATION: shortness of breath       COMPARISON: 4/8/2022       TECHNIQUE: PA and lateral views of the chest were obtained. Impression   1. Borderline heart size. Small hiatus hernia. 2. No acute findings. No infiltrates or effusions are seen. **This report has been created using voice recognition software. It may contain minor errors which are inherent in voice recognition technology. **       Final report electronically signed by Dr. Charlotte Izaguirre on 8/28/2022 12:58 PM        TTE procedure:ECHOCARDIOGRAM COMPLETE 2D W DOPPLER W COLOR. Procedure Date  Date: 08/29/2022 Start: 08:53 AM     Study Location: Bedside  Technical Quality: Adequate visualization     Indications:Dyspnea on exertion and Lower extremity edema. Additional Medical History:Dyspnea, anemia, anxiety, hypothyroidism     Patient Status: Routine     Height: 67 inches Weight: 202 pounds BSA: 2.03 m^2 BMI: 31.64 kg/m^2     BP: 130/60 mmHg     Conclusions     Summary  Ejection fraction is visually estimated at 60%. Overall left ventricular function is normal.     Signature     ----------------------------------------------------------------  Electronically signed by Alejandro Chandra MD (Interpreting  physician) on 08/29/2022 at 03:59 PM  ----------------------------------------------------------------     Findings     Mitral Valve  Trace mitral regurgitation is present. Aortic Valve  The aortic valve was trileaflet with normal thickness and cuspal  separation. DOPPLER: Transaortic velocity was within the normal range with  no evidence of aortic stenosis. There was no evidence of aortic  regurgitation. Tricuspid Valve  Trivial tricuspid regurgitation visualized.      Pulmonic Valve  The pulmonic valve was not well visualized . Trivial pulmonic regurgitation visualized. Left Atrium  Left atrial size was normal.     Left Ventricle  Ejection fraction is visually estimated at 60%. Overall left ventricular function is normal.     Right Atrium  Right atrial size was normal.     Right Ventricle  The right ventricular size was normal with normal systolic function and  wall thickness. Pericardial Effusion  The pericardium was normal in appearance with no evidence of a pericardial  effusion. Pleural Effusion  No evidence of pleural effusion. Aorta / Great Vessels  -Aortic root dimension within normal limits.  -The Pulmonary artery is within normal limits. -IVC size is within normal limits with normal respiratory phasic changes. Latest Reference Range & Units 8/29/22 06:38   Sodium 135 - 145 meq/L 141   Potassium 3.5 - 5.2 meq/L 5.0   Chloride 98 - 111 meq/L 106   CO2 23 - 33 meq/L 25   BUN,BUNPL 7 - 22 mg/dL 17   Creatinine 0.4 - 1.2 mg/dL 0.8   Anion Gap 8.0 - 16.0 meq/L 10.0   Est, Glom Filt Rate ml/min/1.73m2 72 ! Glucose, Random 70 - 108 mg/dL 101   CALCIUM, SERUM, 441322 8.5 - 10.5 mg/dL 9.1   WBC 4.8 - 10.8 thou/mm3 4.2 (L)   RBC 4.20 - 5.40 mill/mm3 3.70 (L)   Hemoglobin Quant 12.0 - 16.0 gm/dl 9.2 (L)   Hematocrit 37.0 - 47.0 % 32.6 (L)   MCV 81.0 - 99.0 fL 88.1   MCH 26.0 - 33.0 pg 24.9 (L)   MCHC 32.2 - 35.5 gm/dl 28.2 (L)   MPV 9.4 - 12.4 fL 10.9   RDW-CV 11.5 - 14.5 % 22.9 (H)   RDW-SD 35.0 - 45.0 fL 73.5 (H)   Platelet Count 590 - 400 thou/mm3 220   !: Data is abnormal  (L): Data is abnormally low  (H): Data is abnormally high   Latest Reference Range & Units 8/29/22 13:45   Respiratory Culture  Normal gary- preliminary Normal gary    Gram Stain Result  Quality of sputum specimen: Specimen acceptable. Moderate segmented neutrophils observed. Few epithelial cells observed. Moderate gram negative bacilli. Moderate gram positive cocci in pairs.     Chlamydia pneumoniae By PCR Not Detected  Not Detected   Rhinovirus Enterovirus PCR Not Detected  Detected !    Influenza A by PCR Not Detected  Not Detected   Influenza B by PCR Not Detected  Not Detected   Legionella pneumophilia by PCR Not Detected  Not Detected   Haemophilus Influenzae by PCR Not Detected  Not Detected   Enterobacter cloacae complex by PCR Not Detected  Not Detected   Escherichia coli by PCR Not Detected  Not Detected   Klebsiella oxytoca by PCR Not Detected  Not Detected   Klebsiella pneumoniae group by PCR Not Detected  Not Detected   Serratia marcescens by PCR Not Detected  Not Detected   Pseudomonas aeruginosa by PCR Not Detected  Not Detected   Adenovirus by PCR Not Detected  Not Detected   Respiratory Syncytial Virus by PCR Not Detected  Not Detected   Mycoplasma pneumoniae by PCR Not Detected  Not Detected   Acinetobacter calcoaceticus-baumannii by PCR Not Detected  Not Detected   Klebsiella aerogenes by PCR Not Detected  Not Detected   Metapneumovirus by PCR Not Detected  Not Detected   Moraxella catarrhalis by PCR Not Detected  Not Detected   Parainfluenza virus by PCR Not Detected  Not Detected   Proteus species by PCR Not Detected  Not Detected   Resistant gene ctx-m by PCR Not Detected  N/A   Resistant gene imp by PCR Not Detected  N/A   Resistant gene kpc by PCR Not Detected  N/A   Resistant gene ndm by PCR Not Detected  N/A   Resistant gene vim by PCR Not Detected  N/A   Resistant gene oxa-48-like by pcr Not Detected  N/A   Resistant gene meca/c & mrej by PCR Not Detected  N/A   Staph aureus by PCR Not Detected  Not Detected   Strep agalactiae by PCR Not Detected  Not Detected   Strep pneumoniae by PCR Not Detected  Not Detected   Strep pyogenes by PCR Not Detected  Not Detected   SOURCE, [id number]  see below      Latest Reference Range & Units 8/29/22 13:45   Non-SARS Coronavirus Not Detected  Not Detected      Latest Reference Range & Units 8/28/22 13:10   SARS-CoV-2, NAAT NOT DETECTED  NOT DETECTED       Six Minute Walk Test  Laila Ramsey 1955    Six minute walk test done in my office today by my medical assistant. Sofya's oxygen saturation at rest on room air was 99%. Her oxygen saturation dropped to 97% on room air with exertion after walking 648 feet in 6 minutes. The patient did not require supplemental O2, no order was generated. Assessment      Diagnosis Orders   1. Stage 2 moderate COPD by GOLD classification ([facility])  6 Minute Walk Test    Alpha-1-Antitrypsin    CT CHEST HIGH RESOLUTION      2. SOB (shortness of breath) on exertion  CT CHEST HIGH RESOLUTION      3. Decreased diffusion capacity of lung  Alpha-1-Antitrypsin    CT CHEST HIGH RESOLUTION      4. Snoring  Baseline Diagnostic Sleep Study      5. Class 1 obesity with body mass index (BMI) of 33.0 to 33.9 in adult, unspecified obesity type, unspecified whether serious comorbidity present  Baseline Diagnostic Sleep Study      6. Daytime sleepiness  Baseline Diagnostic Sleep Study      7. Poor sleep hygiene  Baseline Diagnostic Sleep Study      8. Sleep talking  Baseline Diagnostic Sleep Study      9. Bilateral swelling of feet        10. Iron deficiency anemia, unspecified iron deficiency anemia type        11. Personal history of COVID-19          Plan   - Discussed pulmonary function test result with patient and compared to pulmonary function test completed in 2015. Discussed with patient decline in FEV1, FVC, TLC, and DLCO. Discussed starting daily maintenance inhaler therapy with patient and she declined today.    - Advised patient to continue albuterol 2 puffs every 6 hours as needed for shortness of breath or wheezing  - Obtain HRCT to r/o interstitial lung disease/fibrosis with significant decline in DLCO compared to 2015 in a non-smoker  - Obtain A1A testing in COPD patient that is a non-smoker  - Obtain baseline PSG for further evaluation of daytime somnolence, sleep talking, snoring, and obesity  - Advised patient obtain 7-9 hours of sleep per night  - Discouraged caffeine intake in the afternoon  - Continue with hematologist for anemia  - Continue with urology for kidney stones  - Advised patient to follow closely with primary care provider for fluid retention and feet swelling  - Maintain pneumonia vaccine with primary care provider  - Received COVID-19 vaccination  - Advised patient to call office with any changes, questions, or concerns regarding respiratory status    Will see Reji Ortiz back in: 1 month with HRCT prior    Electronically signed by JANEY Yin CNP on 9/20/2022 at 9:53 AM

## 2022-09-14 NOTE — PROGRESS NOTES
Shon Chacon MD  Urology Clinic office visit  NEW PATIENT    Patient:  Sybil Weiner  YOB: 1955  Date: 9/14/2022    HISTORY OF PRESENT ILLNESS:   The patient is a 74 y.o. female who presents today for evaluation of the following problems:      1. Renal cyst    2. Nephrolithiasis    3. Angiomyolipoma    4. Gross hematuria         Overall the problem(s) : show no change. Associated Symptoms: No dysuria, gross hematuria. Pain Severity:      Summary of old records: N/A  (Patient's old records, notes and chart reviewed and summarized above.)      Onset years  Severity is described as moderate. The course of symptoms over time is insidious. Alleviating factors: none  Worsening factors: none  Lower urinary tract symptoms: gross hematuria, painless  Once a week for several weeks    CT 8/2022  Small low-density subcentimeter focus in the superior pole right kidney, unchanged from prior study, likely a small angiomyolipoma. There is a small nonobstructing 2 mm calculus mid body left kidney. In addition there is a small nonobstructing    2 mm calculus superior pole left kidney, associated with a small cyst. This cyst has decreased in size since prior study. Due to the benign appearance of this cyst, follow-up CT imaging to evaluate the cysts is not warranted. Urinalysis today:  No results found for this visit on 09/14/22. Last BUN and creatinine:  Lab Results   Component Value Date    BUN 17 08/29/2022     Lab Results   Component Value Date    CREATININE 0.8 08/29/2022       Imaging Reviewed during this Office Visit:   (results were independently reviewed by physician and radiology report verified)  I independently reviewed and verified the images and reports from:    No results found.       PAST MEDICAL, FAMILY AND SOCIAL HISTORY:  Past Medical History:   Diagnosis Date    Arthritis of left knee 2/8/2022    Asthma     Hypothyroidism 9/9/2013     Past Surgical History:   Procedure Laterality Date    ANKLE ARTHROSCOPY Left 05/15/2017    COLONOSCOPY  08/23/2022    ENDOSCOPY, COLON, DIAGNOSTIC  08/09/2022    HYSTERECTOMY (CERVIX STATUS UNKNOWN)      THYROID SURGERY  01/05/2011    TONSILLECTOMY       Family History   Problem Relation Age of Onset    Arthritis Mother     High Blood Pressure Mother     Breast Cancer Mother 80    Birth Defects Father     Birth Defects Brother     Diabetes Maternal Aunt     Breast Cancer Maternal Cousin 64     Outpatient Medications Marked as Taking for the 9/14/22 encounter (Telemedicine) with Yamel Ramirez MD   Medication Sig Dispense Refill    albuterol sulfate HFA (PROVENTIL HFA) 108 (90 Base) MCG/ACT inhaler Inhale 2 puffs into the lungs every 6 hours as needed for Wheezing 18 g 3    pantoprazole (PROTONIX) 40 MG tablet Take 40 mg by mouth 2 times daily      levothyroxine (SYNTHROID) 50 MCG tablet take 1 tablet by mouth once daily 30 tablet 11    valACYclovir (VALTREX) 500 MG tablet Take 1 tablet by mouth daily 30 tablet 11    valsartan-hydroCHLOROthiazide (DIOVAN-HCT) 160-12.5 MG per tablet Take 1 tablet by mouth daily 30 tablet 11    citalopram (CELEXA) 40 MG tablet Take 1 tablet by mouth daily 30 tablet 11       Bee venom, Tetracyclines & related, Zithromax [azithromycin dihydrate], Doxycycline, Effexor [venlafaxine], Synthroid [levothyroxine], Cyclobenzaprine, and Hydrochlorothiazide  Social History     Tobacco Use   Smoking Status Never   Smokeless Tobacco Never       Social History     Substance and Sexual Activity   Alcohol Use Yes    Alcohol/week: 0.0 standard drinks    Comment: rarely       REVIEW OF SYSTEMS:  Constitutional: negative  Eyes: negative  Respiratory: negative  Cardiovascular: negative  Gastrointestinal: negative  Genitourinary: negative except for what is in HPI  Musculoskeletal: negative  Skin: negative   Neurological: negative  Hematological/Lymphatic: negative  Psychological: negative    Physical Exam:    This a 74 y.o. female There were no vitals filed for this visit. Constitutional- no acute distress, alert and oriented  Eyes- extra ocular movements intact, no scleral icterus  Ears, nose, mouth, throat- no nasal deformity, external auricles normal  Cardiovascular- no evidence of cyanosis, no obvious jugular distension   Respiratory- normal respiratory effort, and normal chest rise  Gastrointestinal- abdomen non-distended  Musculoskeletal- no upper extremity deformity, normal range of motion of upper extremities  Skin- no obvious abrasions, no rash of head and neck  Psych- normal mood, normal affect  Neuro- no facial droop, no slurred speech        Assessment and Plan      1. Renal cyst    2. Nephrolithiasis    3. Angiomyolipoma    4. Gross hematuria           Plan:        Small AML  Small stones  Renal cyst      Cystoscopy bilateral retrograde pyelogram under MAC  Ucx pre op    No follow-ups on file. Prescriptions Ordered:  No orders of the defined types were placed in this encounter. Orders Placed:  Orders Placed This Encounter   Procedures    Culture, Urine     Order Specific Question:   Specify (ex-cath, midstream, cysto, etc)? Answer:   MD Aicha Reich M.D, MD [facility]    Patient being evaluated by a Virtual Visit (video visit) encounter to address concerns as mentioned above. A caregiver was present when appropriate. Due to this being a TeleHealth encounter (During QEGGJ-08 public health emergency), evaluation of the following organ systems was limited: Vitals/Constitutional/EENT/Resp/CV/GI//MS/Neuro/Skin/Heme-Lymph-Imm.   Pursuant to the emergency declaration under the [facility] [address] authority and the [employer] Act, this Virtual Visit was conducted with patient's (and/or legal guardian's) consent, to reduce the patient's risk of exposure to COVID-19 and provide necessary medical care. The patient (and/or legal guardian) has also been advised to contact this office for worsening conditions or problems, and seek emergency medical treatment and/or call 911 if deemed necessary. I was located at my home office. Patient was located at home. Services were provided through a video synchronous discussion virtually to substitute for in-person clinic visit. This encounter was initiated by the patient.

## 2022-09-15 ENCOUNTER — HOSPITAL ENCOUNTER (OUTPATIENT)
Dept: PULMONOLOGY | Age: 67
Discharge: HOME | End: 2022-09-15
Payer: COMMERCIAL

## 2022-09-15 DIAGNOSIS — R06.09 DYSPNEA ON EXERTION: ICD-10-CM

## 2022-09-15 PROCEDURE — 94060 EVALUATION OF WHEEZING: CPT

## 2022-09-15 PROCEDURE — 94726 PLETHYSMOGRAPHY LUNG VOLUMES: CPT

## 2022-09-15 PROCEDURE — 94729 DIFFUSING CAPACITY: CPT

## 2022-09-19 ENCOUNTER — TELEPHONE (OUTPATIENT)
Dept: UROLOGY | Age: 67
End: 2022-09-19

## 2022-09-19 ENCOUNTER — HOSPITAL ENCOUNTER (OUTPATIENT)
Age: 67
Discharge: HOME | End: 2022-09-19
Payer: MEDICARE

## 2022-09-19 DIAGNOSIS — Z01.818 PRE-OP TESTING: ICD-10-CM

## 2022-09-19 DIAGNOSIS — R31.0 GROSS HEMATURIA: ICD-10-CM

## 2022-09-19 DIAGNOSIS — R31.0 GROSS HEMATURIA: Primary | ICD-10-CM

## 2022-09-19 PROCEDURE — 87086 URINE CULTURE/COLONY COUNT: CPT

## 2022-09-19 NOTE — TELEPHONE ENCOUNTER
Patient is scheduled for Cystoscopy, Bilateral retrograde pyelogram under MAC with Dr Suzi Cornejo on 9/27/22. We are asking for clearance. Thank you.

## 2022-09-19 NOTE — TELEPHONE ENCOUNTER
S[facility] [address]  [address] [place] [address] [address] [place], [address]      Phone *[phone] *[phone]   Surgical Scheduling Direct Line Phone *[phone] Fax *[phone]      Nayeli Loera 1955 female    [address] [facility] [address]  Marital Status:          Home Phone: [phone]      Cell Phone:    Telephone Information:   Mobile [phone]          Surgeon: Dr. Cuca Keys Surgery Date: 09/27/2022   Time: 7:30 AM    Procedure: Cystoscopy, Bilateral retrograde pyelogram    Diagnosis: Gross hematuria     Important Medical History:  In Epic    Special Inst/Equip:     CPT Codes:    02421  Latex Allergy: No     Cardiac Device:  No    Anesthesia:  MAC          Admission Type:  Same Day                        Admit Prior to Day of Surgery: No    Case Location:  Main OR            Preadmission Testing:  Phone Call          PAT Date and Time:______________________________________________________    PAT Confirmation #: ______________________________________________________    Post Op Visit: ___________________________________________________________    Need Preop Cardiac Clearance: Yes    Does Patient have Cardiologist/physician?      Dr Kiara Powell Confirmation #: __________________________________________________    Levell Bingham: ________________________   Date: __________________________     Insurance Company Name: [employer] Santac[employer]

## 2022-09-19 NOTE — TELEPHONE ENCOUNTER
Patient is scheduled for surgery with Dr Christiano Kim on 9/27/22. Surgery consent on arrival. Surgery instructions gone over verbally, NPO after midnight, Patient will have an adult over the age of 25 with them at discharge and 24 hours after procedure, report to [facility] at 6:00 AM. Dr Bar Schools to clear. Patient to do pre op urine culture now.

## 2022-09-20 ENCOUNTER — OFFICE VISIT (OUTPATIENT)
Dept: PULMONOLOGY | Age: 67
End: 2022-09-20
Payer: MEDICARE

## 2022-09-20 VITALS
TEMPERATURE: 97.9 F | HEIGHT: 67 IN | SYSTOLIC BLOOD PRESSURE: 138 MMHG | DIASTOLIC BLOOD PRESSURE: 68 MMHG | BODY MASS INDEX: 32.96 KG/M2 | OXYGEN SATURATION: 98 % | WEIGHT: 210 LBS | HEART RATE: 67 BPM

## 2022-09-20 DIAGNOSIS — E66.9 CLASS 1 OBESITY WITH BODY MASS INDEX (BMI) OF 33.0 TO 33.9 IN ADULT, UNSPECIFIED OBESITY TYPE, UNSPECIFIED WHETHER SERIOUS COMORBIDITY PRESENT: ICD-10-CM

## 2022-09-20 DIAGNOSIS — R94.2 DECREASED DIFFUSION CAPACITY OF LUNG: ICD-10-CM

## 2022-09-20 DIAGNOSIS — M79.89 BILATERAL SWELLING OF FEET: ICD-10-CM

## 2022-09-20 DIAGNOSIS — R40.0 DAYTIME SLEEPINESS: ICD-10-CM

## 2022-09-20 DIAGNOSIS — Z86.16 PERSONAL HISTORY OF COVID-19: ICD-10-CM

## 2022-09-20 DIAGNOSIS — D50.9 IRON DEFICIENCY ANEMIA, UNSPECIFIED IRON DEFICIENCY ANEMIA TYPE: ICD-10-CM

## 2022-09-20 DIAGNOSIS — R06.02 SOB (SHORTNESS OF BREATH) ON EXERTION: ICD-10-CM

## 2022-09-20 DIAGNOSIS — J44.9 STAGE 2 MODERATE COPD BY GOLD CLASSIFICATION (HCC): Primary | ICD-10-CM

## 2022-09-20 DIAGNOSIS — Z72.821 POOR SLEEP HYGIENE: ICD-10-CM

## 2022-09-20 DIAGNOSIS — G47.8 SLEEP TALKING: ICD-10-CM

## 2022-09-20 DIAGNOSIS — R06.83 SNORING: ICD-10-CM

## 2022-09-20 PROBLEM — E66.811 CLASS 1 OBESITY WITH BODY MASS INDEX (BMI) OF 33.0 TO 33.9 IN ADULT: Status: ACTIVE | Noted: 2022-09-20

## 2022-09-20 PROCEDURE — G8427 DOCREV CUR MEDS BY ELIG CLIN: HCPCS

## 2022-09-20 PROCEDURE — 3023F SPIROM DOC REV: CPT

## 2022-09-20 PROCEDURE — 99204 OFFICE O/P NEW MOD 45 MIN: CPT

## 2022-09-20 PROCEDURE — 1090F PRES/ABSN URINE INCON ASSESS: CPT

## 2022-09-20 PROCEDURE — G8417 CALC BMI ABV UP PARAM F/U: HCPCS

## 2022-09-20 PROCEDURE — 1123F ACP DISCUSS/DSCN MKR DOCD: CPT

## 2022-09-20 PROCEDURE — 94618 PULMONARY STRESS TESTING: CPT

## 2022-09-20 PROCEDURE — 1036F TOBACCO NON-USER: CPT

## 2022-09-20 PROCEDURE — 3017F COLORECTAL CA SCREEN DOC REV: CPT

## 2022-09-20 PROCEDURE — G8400 PT W/DXA NO RESULTS DOC: HCPCS

## 2022-09-20 ASSESSMENT — ENCOUNTER SYMPTOMS
SHORTNESS OF BREATH: 1
SINUS PAIN: 0
RHINORRHEA: 0
COUGH: 0
SORE THROAT: 0
WHEEZING: 0
CHEST TIGHTNESS: 0
SINUS PRESSURE: 0

## 2022-09-20 NOTE — PROGRESS NOTES
Patient is experiencing SOB: Yes    Patient is experiencing wheezing: No    Patient states they have had a Absent = 4 cough.     Phlegm is none    Patient is coughing up blood: no    Patient has been experiencing chest pains: non-existent    Patient is currently taking the following inhaler(s): Albuterol

## 2022-09-20 NOTE — PROGRESS NOTES
Follow all instructions given by your physician  NPO after midnight   Sips of water am of surgery with allowed medications  Bring insurance info and 's license  Wear comfortable clean, loose fitting clothing  No jewelry or contact lenses to be worn day of surgery   Case for glasses. Shower night before and morning of surgery with a liquid antibacterial soap, dry with fresh clean towel; no lotions, creams or powder. Clean sheets and pillow case on bed night before surgery  Bring medications in original bottles  Bring CPAP/BIPAP machine if you have one ( you may be charged if one is needed in recovery room )   needed at discharge and someone over 18 to stay with you for 24 hours overnight (surgery may be cancelled if you don't have this)  Report to [facility] on 2nd floor  If you would become ill prior to surgery, please call the surgeon  May have a visitor with you, we request that you limit to 2 visitors in pre-op area  Please bring and wear mask  Call [phone] for any questions  Covid questionnaire Complete; Patient negative for symptoms or exposure. See documentation.

## 2022-09-20 NOTE — PATIENT INSTRUCTIONS
Continue your albuterol inhaler. You may take 2 puffs every 6 hours as needed for shortness of breath or wheezing. We will obtain a high resolution CT scan of your chest before your next visit. We will obtain a sleep study and sleep clinic follow up.

## 2022-09-20 NOTE — TELEPHONE ENCOUNTER
Regular stress done. ECHO was denied. Conclusions      Summary   Exercise EKG stress test is not suggestive for ischemia. Recommendation   Clinical correlation is recommended. Signatures  ----------------------------------------------------------------   Electronically signed by Arnulfo Leyva MD (Performing   Physician) on 09/14/2022 at 13:36    Please advise.

## 2022-09-20 NOTE — PROGRESS NOTES
PAT call attempted, patient unavailable, left message to please call us back at your earliest convenience; [phone]

## 2022-09-21 LAB
ORGANISM: ABNORMAL
URINE CULTURE, ROUTINE: ABNORMAL

## 2022-09-22 ENCOUNTER — TELEPHONE (OUTPATIENT)
Dept: UROLOGY | Age: 67
End: 2022-09-22

## 2022-09-22 RX ORDER — SULFAMETHOXAZOLE AND TRIMETHOPRIM 800; 160 MG/1; MG/1
1 TABLET ORAL 2 TIMES DAILY
Qty: 14 TABLET | Refills: 0 | Status: SHIPPED | OUTPATIENT
Start: 2022-09-22 | End: 2022-09-29

## 2022-09-22 NOTE — TELEPHONE ENCOUNTER
Please review urine culture on 9/19/22. Surgery with Dr Yessica Gutierrez on 9/27/22 for a Cystoscopy, Bilateral retrograde pyelogram Thanks.

## 2022-09-23 ENCOUNTER — TELEPHONE (OUTPATIENT)
Dept: UROLOGY | Age: 67
End: 2022-09-23

## 2022-09-23 ENCOUNTER — HOSPITAL ENCOUNTER (OUTPATIENT)
Age: 67
End: 2022-09-23

## 2022-09-23 ENCOUNTER — HOSPITAL ENCOUNTER (OUTPATIENT)
Dept: CT IMAGING | Age: 67
Discharge: HOME | End: 2022-09-23
Payer: MEDICARE

## 2022-09-23 ENCOUNTER — PREP FOR PROCEDURE (OUTPATIENT)
Dept: UROLOGY | Age: 67
End: 2022-09-23

## 2022-09-23 DIAGNOSIS — R06.02 SOB (SHORTNESS OF BREATH) ON EXERTION: ICD-10-CM

## 2022-09-23 DIAGNOSIS — R94.2 DECREASED DIFFUSION CAPACITY OF LUNG: ICD-10-CM

## 2022-09-23 DIAGNOSIS — J44.9 STAGE 2 MODERATE COPD BY GOLD CLASSIFICATION (HCC): ICD-10-CM

## 2022-09-23 PROCEDURE — 71250 CT THORAX DX C-: CPT

## 2022-09-23 RX ORDER — SODIUM CHLORIDE 9 MG/ML
INJECTION, SOLUTION INTRAVENOUS CONTINUOUS
Status: CANCELLED | OUTPATIENT
Start: 2022-09-27

## 2022-09-23 NOTE — TELEPHONE ENCOUNTER
Per Savanna Rain at [address] [place] [address]  no pre certification is required for CPT code 48412. Call DYV#[id number].

## 2022-09-26 ENCOUNTER — TELEPHONE (OUTPATIENT)
Dept: UROLOGY | Age: 67
End: 2022-09-26

## 2022-09-26 NOTE — TELEPHONE ENCOUNTER
Patient stopped the bactrim yesterday. She broke out with a rash with itching on her feet, legs, arms, and back. She is scheduled Cystoscopy, Bilateral retrograde pyelogram on 09/27/2022.

## 2022-09-27 ENCOUNTER — HOSPITAL ENCOUNTER (OUTPATIENT)
Age: 67
Setting detail: OUTPATIENT SURGERY
Discharge: HOME | End: 2022-09-27
Attending: UROLOGY | Admitting: UROLOGY
Payer: MEDICARE

## 2022-09-27 ENCOUNTER — ANESTHESIA (OUTPATIENT)
Dept: OPERATING ROOM | Age: 67
End: 2022-09-27
Payer: MEDICARE

## 2022-09-27 ENCOUNTER — ANESTHESIA EVENT (OUTPATIENT)
Dept: OPERATING ROOM | Age: 67
End: 2022-09-27
Payer: MEDICARE

## 2022-09-27 VITALS
TEMPERATURE: 97.1 F | DIASTOLIC BLOOD PRESSURE: 65 MMHG | RESPIRATION RATE: 16 BRPM | BODY MASS INDEX: 32.52 KG/M2 | HEIGHT: 67 IN | SYSTOLIC BLOOD PRESSURE: 116 MMHG | HEART RATE: 62 BPM | OXYGEN SATURATION: 95 % | WEIGHT: 207.2 LBS

## 2022-09-27 PROCEDURE — 6360000002 HC RX W HCPCS: Performed by: NURSE ANESTHETIST, CERTIFIED REGISTERED

## 2022-09-27 PROCEDURE — 3600000002 HC SURGERY LEVEL 2 BASE: Performed by: UROLOGY

## 2022-09-27 PROCEDURE — 6360000004 HC RX CONTRAST MEDICATION: Performed by: UROLOGY

## 2022-09-27 PROCEDURE — 6360000002 HC RX W HCPCS

## 2022-09-27 PROCEDURE — 3600000012 HC SURGERY LEVEL 2 ADDTL 15MIN: Performed by: UROLOGY

## 2022-09-27 PROCEDURE — C1758 CATHETER, URETERAL: HCPCS | Performed by: UROLOGY

## 2022-09-27 PROCEDURE — 2580000003 HC RX 258

## 2022-09-27 PROCEDURE — 3700000001 HC ADD 15 MINUTES (ANESTHESIA): Performed by: UROLOGY

## 2022-09-27 PROCEDURE — 3700000000 HC ANESTHESIA ATTENDED CARE: Performed by: UROLOGY

## 2022-09-27 PROCEDURE — 7100000010 HC PHASE II RECOVERY - FIRST 15 MIN: Performed by: UROLOGY

## 2022-09-27 PROCEDURE — 7100000011 HC PHASE II RECOVERY - ADDTL 15 MIN: Performed by: UROLOGY

## 2022-09-27 PROCEDURE — C1769 GUIDE WIRE: HCPCS | Performed by: UROLOGY

## 2022-09-27 PROCEDURE — 2709999900 HC NON-CHARGEABLE SUPPLY: Performed by: UROLOGY

## 2022-09-27 RX ORDER — FENTANYL CITRATE 50 UG/ML
INJECTION, SOLUTION INTRAMUSCULAR; INTRAVENOUS PRN
Status: DISCONTINUED | OUTPATIENT
Start: 2022-09-27 | End: 2022-09-27 | Stop reason: SDUPTHER

## 2022-09-27 RX ORDER — PROPOFOL 10 MG/ML
INJECTION, EMULSION INTRAVENOUS PRN
Status: DISCONTINUED | OUTPATIENT
Start: 2022-09-27 | End: 2022-09-27 | Stop reason: SDUPTHER

## 2022-09-27 RX ORDER — MIDAZOLAM HYDROCHLORIDE 1 MG/ML
INJECTION INTRAMUSCULAR; INTRAVENOUS PRN
Status: DISCONTINUED | OUTPATIENT
Start: 2022-09-27 | End: 2022-09-27 | Stop reason: SDUPTHER

## 2022-09-27 RX ORDER — PHENAZOPYRIDINE HYDROCHLORIDE 200 MG/1
200 TABLET, FILM COATED ORAL 3 TIMES DAILY PRN
Qty: 9 TABLET | Refills: 0 | Status: SHIPPED | OUTPATIENT
Start: 2022-09-27 | End: 2022-10-18

## 2022-09-27 RX ORDER — CIPROFLOXACIN 500 MG/1
500 TABLET, FILM COATED ORAL 2 TIMES DAILY
Qty: 6 TABLET | Refills: 0 | Status: SHIPPED | OUTPATIENT
Start: 2022-09-27 | End: 2022-09-30

## 2022-09-27 RX ORDER — SODIUM CHLORIDE 9 MG/ML
INJECTION, SOLUTION INTRAVENOUS CONTINUOUS
Status: DISCONTINUED | OUTPATIENT
Start: 2022-09-27 | End: 2022-09-27 | Stop reason: HOSPADM

## 2022-09-27 RX ADMIN — SODIUM CHLORIDE: 9 INJECTION, SOLUTION INTRAVENOUS at 06:56

## 2022-09-27 RX ADMIN — MIDAZOLAM 2 MG: 1 INJECTION INTRAMUSCULAR; INTRAVENOUS at 07:47

## 2022-09-27 RX ADMIN — FENTANYL CITRATE 50 MCG: 50 INJECTION, SOLUTION INTRAMUSCULAR; INTRAVENOUS at 07:52

## 2022-09-27 RX ADMIN — PROPOFOL 40 MG: 10 INJECTION, EMULSION INTRAVENOUS at 08:03

## 2022-09-27 RX ADMIN — FENTANYL CITRATE 50 MCG: 50 INJECTION, SOLUTION INTRAMUSCULAR; INTRAVENOUS at 07:53

## 2022-09-27 RX ADMIN — PROPOFOL 30 MG: 10 INJECTION, EMULSION INTRAVENOUS at 07:54

## 2022-09-27 RX ADMIN — CEFAZOLIN 2000 MG: 10 INJECTION, POWDER, FOR SOLUTION INTRAVENOUS at 07:51

## 2022-09-27 ASSESSMENT — PAIN SCALES - GENERAL: PAINLEVEL_OUTOF10: 0

## 2022-09-27 ASSESSMENT — PAIN - FUNCTIONAL ASSESSMENT: PAIN_FUNCTIONAL_ASSESSMENT: 0-10

## 2022-09-27 NOTE — H&P
History and Physical    Patient:  Shabnam Narayan  MRN: [id number]  YOB: 1955    CHIEF COMPLAINT:  gross hematuria    HISTORY OF PRESENT ILLNESS:   The patient is a 74 y.o. female who presents with as above, here for surgery    Patient's old records, notes and chart reviewed and summarized above. Past Medical History:    Past Medical History:   Diagnosis Date    Arthritis of left knee 02/08/2022    Hiatal hernia     History of blood transfusion 08/2022    low Iron    History of kidney stones     Hypertension     Hypothyroidism 09/09/2013    Renal cyst 02/28/2022       Past Surgical History:    Past Surgical History:   Procedure Laterality Date    ANKLE ARTHROSCOPY Left 05/15/2017    COLONOSCOPY  08/23/2022    ENDOSCOPY, COLON, DIAGNOSTIC  08/09/2022    FOOT SURGERY Right 04/2022    HYSTERECTOMY (CERVIX STATUS UNKNOWN)  2009    KNEE ARTHROSCOPY Left 07/20/2021    THYROID SURGERY  01/05/2011    TONSILLECTOMY       Medications Prior to Admission:    Prior to Admission medications    Medication Sig Start Date End Date Taking?  Authorizing Provider   sulfamethoxazole-trimethoprim (BACTRIM DS;SEPTRA DS) 800-160 MG per tablet Take 1 tablet by mouth 2 times daily for 7 days  Patient not taking: Reported on 9/27/2022 9/22/22 9/29/22  JANEY Mesa CNP   albuterol sulfate HFA (PROVENTIL HFA) 108 (90 Base) MCG/ACT inhaler Inhale 2 puffs into the lungs every 6 hours as needed for Wheezing  Patient not taking: Reported on 9/20/2022 8/29/22   Brenda Maurice DO   pantoprazole (PROTONIX) 40 MG tablet Take 40 mg by mouth 2 times daily 7/7/22   Historical Provider, MD   levothyroxine (SYNTHROID) 50 MCG tablet take 1 tablet by mouth once daily 2/25/22   JANEY Scales CNP   valACYclovir (VALTREX) 500 MG tablet Take 1 tablet by mouth daily  Patient taking differently: Take 500 mg by mouth daily as needed 2/25/22   JANEY Scales CNP valsartan-hydroCHLOROthiazide (DIOVAN-HCT) 160-12.5 MG per tablet Take 1 tablet by mouth daily 2/25/22   JANEY Garner CNP   citalopram (CELEXA) 40 MG tablet Take 1 tablet by mouth daily 2/25/22   JANEY Garner CNP       Allergies:  Bee venom, Tetracyclines & related, Zithromax [azithromycin dihydrate], Bactrim [sulfamethoxazole-trimethoprim], Doxycycline, Effexor [venlafaxine], Cyclobenzaprine, and Hydrochlorothiazide    Social History:    Social History     Socioeconomic History    Marital status:      Spouse name: Not on file    Number of children: Not on file    Years of education: Not on file    Highest education level: Not on file   Occupational History    Not on file   Tobacco Use    Smoking status: Never    Smokeless tobacco: Never   Vaping Use    Vaping Use: Never used   Substance and Sexual Activity    Alcohol use:  Yes     Alcohol/week: 0.0 standard drinks     Comment: rarely    Drug use: No    Sexual activity: Not on file   Other Topics Concern    Not on file   Social History Narrative    Not on file     Social Determinants of Health     Financial Resource Strain: Not on file   Food Insecurity: Not on file   Transportation Needs: Not on file   Physical Activity: Not on file   Stress: Not on file   Social Connections: Not on file   Intimate Partner Violence: Not on file   Housing Stability: Not on file       Family History:    Family History   Problem Relation Age of Onset    Arthritis Mother     High Blood Pressure Mother     Breast Cancer Mother 80    Birth Defects Father     Birth Defects Brother     Diabetes Maternal Aunt     Breast Cancer Maternal Cousin 64       REVIEW OF SYSTEMS:  Constitutional: negative  Eyes: negative  Respiratory: negative  Cardiovascular: negative  Gastrointestinal: negative  Genitourinary: see HPI  Musculoskeletal: negative  Skin: negative   Neurological: negative  Hematological/Lymphatic: negative  Psychological: negative    Physical Exam:      Patient Vitals for the past 24 hrs:   BP Temp Temp src Pulse Resp SpO2 Height Weight   09/27/22 0618 -- -- -- -- -- -- 5' 7\" (1.702 m) 207 lb 3.2 oz (94 kg)   09/27/22 0611 131/62 97.6 °F (36.4 °C) Temporal 79 16 96 % -- --     Constitutional: Patient in no acute distress; Neuro: alert and oriented to person place and time. Psych: Mood and affect normal.  Skin: Normal  Lungs: Respiratory effort normal, CTA  Cardiovascular:  Normal peripheral pulses; no murmur  Abdomen: Soft, non-tender, non-distended with no CVA, flank pain, hepatosplenomegaly or hernia. Kidneys normal.  Bladder non-tender and not distended. LABS:   No results for input(s): WBC, HGB, HCT, MCV, PLT in the last 72 hours. No results for input(s): NA, K, CL, CO2, PHOS, BUN, CREATININE, CA in the last 72 hours. No results found for: PSA        Urinalysis: No results for input(s): COLORU, PHUR, LABCAST, WBCUA, RBCUA, MUCUS, TRICHOMONAS, YEAST, BACTERIA, CLARITYU, SPECGRAV, LEUKOCYTESUR, UROBILINOGEN, Lunda Chol in the last 72 hours.     Invalid input(s): NITRATE, GLUCOSEUKETONESUAMORPHOUS     -----------------------------------------------------------------      Assessment and Plan   Impression:    Patient Active Problem List   Diagnosis    Status post partial thyroidectomy    Sensorineural hearing loss, bilateral    Dizziness and giddiness    Subjective tinnitus    Vestibulopathy    Meniere disease    Chronic rhinitis    Hypertrophy of nasal turbinates    Deviated nasal septum    Mastoiditis    Meniere's disease    Tinnitus    Candida vaginitis    ETD (eustachian tube dysfunction)    Nasal congestion    Chronic serous OM (otitis media)    Nausea and vomiting    Medication reaction    Hypothyroidism    S/P partial thyroidectomy    Multinodular goiter    Hyperlipidemia    Anxiety    Episode of recurrent major depressive disorder (HCC)    Complex regional pain syndrome type 1 affecting left lower leg    Arthritis of left knee    Tibialis tendinitis    Anemia    Dyspnea    CAP (community acquired pneumonia)    Microscopic hematuria    Chronic blood loss anemia    Class 1 obesity with body mass index (BMI) of 33.0 to 33.9 in adult    Stage 2 moderate COPD by GOLD classification (HCC)    Decreased diffusion capacity of lung    Personal history of COVID-19    Iron deficiency anemia       Plan:   Risks: I discussed all the risks, benefits, alternatives and possible complications or surgery as well as expectations and post-op recovery.       Consent obtained; Cystoscopy Bilateral Retrograde Pyelogram in OR today    Miky Mckeon M.D, MD  7:42 AM 9/27/2022

## 2022-09-27 NOTE — DISCHARGE INSTRUCTIONS
Call your doctor for the following:   Chills   Temperature greater than 101   Pain that is not tolerable despite taking pain medicine as ordered   Inability to urinate >12 hours   There is increased swelling, redness or warmth at surgical site   There is increased drainage or bleeding from surgical site         No alcoholic beverages, no driving or operating machinery, no making important decisions for 24 hours. Take your prescribed medications (if any) as instructed. You may have a normal diet but should eat lightly on the day of surgery. Drink plenty of fluids. You may notice some burning or blood with urination, this is normal and should improve over next few days. You may also take motrin/ibuprofen for pain control, you can alternate this with your other pain medications. Be sure to take over the counter stool softeners if you notice constipation or hard stools. Follow up with Dr. Esequiel Wang, office will arrange.

## 2022-09-27 NOTE — ANESTHESIA PRE PROCEDURE
Department of Anesthesiology  Preprocedure Note       Name:  Apolonio Crigler   Age:  74 y.o.  :  1955                                          MRN:  [id number]         Date:  2022      Surgeon: Jeancarlos Dudley):  Luis Pennington MD    Procedure: Procedure(s):  Cystoscopy Bilateral Retrograde Pyelogram    Medications prior to admission:   Prior to Admission medications    Medication Sig Start Date End Date Taking?  Authorizing Provider   sulfamethoxazole-trimethoprim (BACTRIM DS;SEPTRA DS) 800-160 MG per tablet Take 1 tablet by mouth 2 times daily for 7 days  Patient not taking: Reported on 2022  JANEY Curry CNP   albuterol sulfate HFA (PROVENTIL HFA) 108 (90 Base) MCG/ACT inhaler Inhale 2 puffs into the lungs every 6 hours as needed for Wheezing  Patient not taking: Reported on 2022   Olvin Villeda DO   pantoprazole (PROTONIX) 40 MG tablet Take 40 mg by mouth 2 times daily 22   Historical Provider, MD   levothyroxine (SYNTHROID) 50 MCG tablet take 1 tablet by mouth once daily 22   JANEY Alva CNP   valACYclovir (VALTREX) 500 MG tablet Take 1 tablet by mouth daily  Patient taking differently: Take 500 mg by mouth daily as needed 22   JANEY Alva CNP   valsartan-hydroCHLOROthiazide (DIOVAN-HCT) 160-12.5 MG per tablet Take 1 tablet by mouth daily 22   JANEY Alva CNP   citalopram (CELEXA) 40 MG tablet Take 1 tablet by mouth daily 22   JANEY Alva CNP       Current medications:    Current Facility-Administered Medications   Medication Dose Route Frequency Provider Last Rate Last Admin    0.9 % sodium chloride infusion   IntraVENous Continuous Radha Rodas CMA (AAMA) 100 mL/hr at 22 0656 New Bag at 22 0656    ceFAZolin (ANCEF) 2000 mg in dextrose 5 % 50 mL IVPB  2,000 mg IntraVENous 30 Min Pre-Op Radha Rodas CMA ([address] [place] [address]) Hypothyroidism 09/09/2013    Renal cyst 02/28/2022       Past Surgical History:        Procedure Laterality Date    ANKLE ARTHROSCOPY Left 05/15/2017    COLONOSCOPY  08/23/2022    ENDOSCOPY, COLON, DIAGNOSTIC  08/09/2022    FOOT SURGERY Right 04/2022    HYSTERECTOMY (CERVIX STATUS UNKNOWN)  2009    KNEE ARTHROSCOPY Left 07/20/2021    THYROID SURGERY  01/05/2011    TONSILLECTOMY         Social History:    Social History     Tobacco Use    Smoking status: Never    Smokeless tobacco: Never   Substance Use Topics    Alcohol use: Yes     Alcohol/week: 0.0 standard drinks     Comment: rarely                                Counseling given: Not Answered      Vital Signs (Current):   Vitals:    09/20/22 1552 09/27/22 0611 09/27/22 0618   BP:  131/62    Pulse:  79    Resp:  16    Temp:  97.6 °F (36.4 °C)    TempSrc:  Temporal    SpO2:  96%    Weight: 210 lb (95.3 kg)  207 lb 3.2 oz (94 kg)   Height: 5' 7\" (1.702 m)  5' 7\" (1.702 m)                                              BP Readings from Last 3 Encounters:   09/27/22 131/62   09/20/22 138/68   09/06/22 128/68       NPO Status: Time of last liquid consumption: 2330 (sip of water with medication at 0430 this am)                        Time of last solid consumption: 2315                        Date of last liquid consumption: 09/26/22                        Date of last solid food consumption: 09/26/22    BMI:   Wt Readings from Last 3 Encounters:   09/27/22 207 lb 3.2 oz (94 kg)   09/20/22 210 lb (95.3 kg)   09/14/22 202 lb (91.6 kg)     Body mass index is 32.45 kg/m².     CBC:   Lab Results   Component Value Date/Time    WBC 4.2 08/29/2022 06:38 AM    RBC 3.70 08/29/2022 06:38 AM    HGB 9.8 08/29/2022 04:47 PM    HCT 34.1 08/29/2022 04:47 PM    MCV 88.1 08/29/2022 06:38 AM    RDW 12.7 05/05/2017 04:25 PM     08/29/2022 06:38 AM       CMP:   Lab Results   Component Value Date/Time     08/29/2022 06:38 AM    K 5.0 08/29/2022 06:38 AM    K 4.0 08/28/2022 11:50 AM     08/29/2022 06:38 AM    CO2 25 08/29/2022 06:38 AM    BUN 17 08/29/2022 06:38 AM    CREATININE 0.8 08/29/2022 06:38 AM    LABGLOM 72 08/29/2022 06:38 AM    GLUCOSE 101 08/29/2022 06:38 AM    GLUCOSE 93 11/11/2011 11:20 AM    PROT 6.4 08/28/2022 11:50 AM    PROT 6.5 08/28/2022 11:50 AM    CALCIUM 9.1 08/29/2022 06:38 AM    BILITOT 0.3 08/28/2022 11:50 AM    BILITOT 0.2 08/28/2022 11:50 AM    ALKPHOS 91 08/28/2022 11:50 AM    ALKPHOS 90 08/28/2022 11:50 AM    AST 17 08/28/2022 11:50 AM    AST 16 08/28/2022 11:50 AM    ALT 16 08/28/2022 11:50 AM    ALT 14 08/28/2022 11:50 AM       POC Tests: No results for input(s): POCGLU, POCNA, POCK, POCCL, POCBUN, POCHEMO, POCHCT in the last 72 hours. Coags:   Lab Results   Component Value Date/Time    INR 0.98 06/30/2022 10:43 AM    APTT 27.7 06/30/2022 10:43 AM       HCG (If Applicable): No results found for: PREGTESTUR, PREGSERUM, HCG, HCGQUANT     ABGs: No results found for: PHART, PO2ART, XXQ3JMQ, WNF7HIL, BEART, S7DRZXRU     Type & Screen (If Applicable):  Lab Results   Component Value Date    LABRH NEG 06/30/2022       Drug/Infectious Status (If Applicable):  Lab Results   Component Value Date/Time    HEPCAB Negative 03/04/2022 10:05 AM       COVID-19 Screening (If Applicable):   Lab Results   Component Value Date/Time    COVID19 NOT DETECTED 08/28/2022 01:10 PM    COVID19 Negative 11/04/2021 02:26 PM           Anesthesia Evaluation   no history of anesthetic complications:   Airway: Mallampati: II  TM distance: >3 FB   Neck ROM: full  Mouth opening: > = 3 FB   Dental:          Pulmonary:normal exam    (+) COPD:            Patient did not smoke on day of surgery.                  Cardiovascular:  Exercise tolerance: good (>4 METS),   (+) hypertension:,                   Neuro/Psych:   (+) psychiatric history:            GI/Hepatic/Renal:   (+) hiatal hernia,           Endo/Other:    (+) hypothyroidism::., .          Pt had no PAT visit Abdominal:             Vascular: negative vascular ROS. Other Findings:           Anesthesia Plan      MAC     ASA 3       Induction: intravenous. MIPS: Postoperative opioids intended and Prophylactic antiemetics administered. Anesthetic plan and risks discussed with patient. Plan discussed with CRNA.                     Kirk Monterroso MD   9/27/2022

## 2022-09-27 NOTE — OP NOTE
Operative Note      Patient: Karly Kang  YOB: 1955  MRN: [id number]    Date of Procedure: 9/27/2022    Pre-Op Diagnosis: Gross hematuria [R31.0]    Post-Op Diagnosis: Same       Procedure(s):  Cystoscopy Bilateral Retrograde Pyelogram and pelvic exam    Surgeon(s):  Harry Copeland MD    Assistant:   * No surgical staff found *    Anesthesia: Monitor Anesthesia Care    Estimated Blood Loss (mL): Minimal    Complications: None    Specimens:   * No specimens in log *    Implants:  * No implants in log *      Drains: * No LDAs found *    Findings: moderate cystocele, normal cystoscopy and pyelogram, no tumors      DETAILS OF THE PROCEDURE:  The patient was correctly identified in the preoperative holding area. The patient  was brought back to the operating room and placed in the dorsal lithotomy  position. Anesthesia was administered; antibiotics administered by Anesthesia. EPC cuffs  were on and functional. The patient was then prepped and draped in the usual sterile fashion. Once an appropriate time out had been performed, with all parties  consenting, a 25 [place]uan cystoscope with a 30-degree lens was placed through  the urethra into the bladder. The right ureteral orifice was cannulated with a 5 [place] ureteral catheter. Contrast was injected and the right ureter and renal pelvis were identified, with  no abnormalities or filling defects. The catheter was removed and placed in the left ureteral orifice. The procedure was repeated once again with  no abnormalities or filling defects present. A thorough and complete cystoscopy was performed with no abnormalities involving the bladder or mucosa. The bladder was drained and the cystoscope was removed. The patient tolerated the procedure well and was sent to the PACU for postoperative monitoring.       Plan:  The patient was discharged home in stable condition with instructions to  follow up in clinic in few week with KIRA for stone work up and counseling      Electronically signed by Rhianna Hope M.D, MD on 9/27/2022 at 8:19 AM

## 2022-09-27 NOTE — PROGRESS NOTES
Pt admitted to [facility] room 14 and oriented to unit. SCD sleeves applied. Nares swabbed. Pt verbalized permission for first name, last initial and physicians name on white board. SDS board and discharge criteria explained, pt and family verbalized understanding. Pt denies thoughts of harming self or others. Call light in reach. Family at the bedside.

## 2022-09-28 ENCOUNTER — HOSPITAL ENCOUNTER (EMERGENCY)
Age: 67
Discharge: HOME | End: 2022-09-28
Attending: STUDENT IN AN ORGANIZED HEALTH CARE EDUCATION/TRAINING PROGRAM
Payer: COMMERCIAL

## 2022-09-28 ENCOUNTER — TELEPHONE (OUTPATIENT)
Dept: UROLOGY | Age: 67
End: 2022-09-28

## 2022-09-28 ENCOUNTER — APPOINTMENT (OUTPATIENT)
Dept: GENERAL RADIOLOGY | Age: 67
End: 2022-09-28
Payer: COMMERCIAL

## 2022-09-28 VITALS
WEIGHT: 200 LBS | BODY MASS INDEX: 31.39 KG/M2 | RESPIRATION RATE: 19 BRPM | HEART RATE: 75 BPM | HEIGHT: 67 IN | DIASTOLIC BLOOD PRESSURE: 71 MMHG | SYSTOLIC BLOOD PRESSURE: 131 MMHG | OXYGEN SATURATION: 99 % | TEMPERATURE: 98.2 F

## 2022-09-28 DIAGNOSIS — R07.9 CHEST PAIN, UNSPECIFIED TYPE: ICD-10-CM

## 2022-09-28 DIAGNOSIS — N20.0 KIDNEY STONES: Primary | ICD-10-CM

## 2022-09-28 DIAGNOSIS — A08.4 VIRAL DIARRHEA: Primary | ICD-10-CM

## 2022-09-28 LAB
ADENOVIRUS F 40 41 PCR: NOT DETECTED
ANION GAP SERPL CALCULATED.3IONS-SCNC: 8 MEQ/L (ref 8–16)
ASTROVIRUS PCR: NOT DETECTED
BACTERIA: ABNORMAL /HPF
BASOPHILS # BLD: 0.3 %
BASOPHILS ABSOLUTE: 0 THOU/MM3 (ref 0–0.1)
BILIRUBIN URINE: ABNORMAL
BLOOD, URINE: NEGATIVE
BUN BLDV-MCNC: 15 MG/DL (ref 7–22)
CALCIUM SERPL-MCNC: 9 MG/DL (ref 8.5–10.5)
CAMPYLOBACTER PCR: NOT DETECTED
CASTS 2: ABNORMAL /LPF
CASTS UA: ABNORMAL /LPF
CHARACTER, URINE: CLEAR
CHLORIDE BLD-SCNC: 105 MEQ/L (ref 98–111)
CLOSTRIDIUM DIFFICILE, PCR: NOT DETECTED
CO2: 24 MEQ/L (ref 23–33)
COLOR: ABNORMAL
CREAT SERPL-MCNC: 0.7 MG/DL (ref 0.4–1.2)
CRYPTOSPORIDIUM PCR: NOT DETECTED
CRYSTALS, UA: ABNORMAL
CYCLOSPORA CAYETANENSIS PCR: NOT DETECTED
E COLI 0157 PCR: ABNORMAL
E COLI ENTEROAGGREGATIVE PCR: NOT DETECTED
E COLI ENTEROPATHOGENIC PCR: NOT DETECTED
E COLI ENTEROTOXIGENIC PCR: NOT DETECTED
E COLI SHIGA LIKE TOXIN PCR: NOT DETECTED
E COLI SHIGELLA/ENTEROINVASIVE PCR: NOT DETECTED
E HISTOLYTICA GI FILM ARRAY: NOT DETECTED
EKG ATRIAL RATE: 91 BPM
EKG P AXIS: 48 DEGREES
EKG P-R INTERVAL: 134 MS
EKG Q-T INTERVAL: 358 MS
EKG QRS DURATION: 74 MS
EKG QTC CALCULATION (BAZETT): 440 MS
EKG R AXIS: 34 DEGREES
EKG T AXIS: 57 DEGREES
EKG VENTRICULAR RATE: 91 BPM
EOSINOPHIL # BLD: 0.3 %
EOSINOPHILS ABSOLUTE: 0 THOU/MM3 (ref 0–0.4)
EPITHELIAL CELLS, UA: ABNORMAL /HPF
ERYTHROCYTE [DISTWIDTH] IN BLOOD BY AUTOMATED COUNT: 19.1 % (ref 11.5–14.5)
ERYTHROCYTE [DISTWIDTH] IN BLOOD BY AUTOMATED COUNT: 59.4 FL (ref 35–45)
GFR SERPL CREATININE-BSD FRML MDRD: 84 ML/MIN/1.73M2
GIARDIA LAMBLIA PCR: NOT DETECTED
GLUCOSE BLD-MCNC: 98 MG/DL (ref 70–108)
GLUCOSE URINE: NEGATIVE MG/DL
HCT VFR BLD CALC: 35.4 % (ref 37–47)
HEMOGLOBIN: 10.7 GM/DL (ref 12–16)
ICTOTEST: NEGATIVE
IMMATURE GRANS (ABS): 0.01 THOU/MM3 (ref 0–0.07)
IMMATURE GRANULOCYTES: 0.3 %
KETONES, URINE: ABNORMAL
LEUKOCYTE ESTERASE, URINE: NEGATIVE
LYMPHOCYTES # BLD: 10.4 %
LYMPHOCYTES ABSOLUTE: 0.4 THOU/MM3 (ref 1–4.8)
MAGNESIUM: 2.1 MG/DL (ref 1.6–2.4)
MCH RBC QN AUTO: 25.5 PG (ref 26–33)
MCHC RBC AUTO-ENTMCNC: 30.2 GM/DL (ref 32.2–35.5)
MCV RBC AUTO: 84.5 FL (ref 81–99)
MISCELLANEOUS 2: ABNORMAL
MONOCYTES # BLD: 5.6 %
MONOCYTES ABSOLUTE: 0.2 THOU/MM3 (ref 0.4–1.3)
NITRITE, URINE: POSITIVE
NOROVIRUS GI GII PCR: NOT DETECTED
NUCLEATED RED BLOOD CELLS: 0 /100 WBC
OSMOLALITY CALCULATION: 274.6 MOSMOL/KG (ref 275–300)
PH UA: 5.5 (ref 5–9)
PLATELET # BLD: 210 THOU/MM3 (ref 130–400)
PLESIOMONAS SHIGELLOIDES PCR: NOT DETECTED
PMV BLD AUTO: 10.1 FL (ref 9.4–12.4)
POTASSIUM REFLEX MAGNESIUM: 4.5 MEQ/L (ref 3.5–5.2)
PRO-BNP: 92.8 PG/ML (ref 0–900)
PROTEIN UA: NEGATIVE
RBC # BLD: 4.19 MILL/MM3 (ref 4.2–5.4)
RBC URINE: ABNORMAL /HPF
RENAL EPITHELIAL, UA: ABNORMAL
ROTAVIRUS A PCR: NOT DETECTED
SALMONELLA PCR: NOT DETECTED
SAPOVIRUS PCR: DETECTED
SARS-COV-2, NAAT: NOT DETECTED
SEG NEUTROPHILS: 83.1 %
SEGMENTED NEUTROPHILS ABSOLUTE COUNT: 3 THOU/MM3 (ref 1.8–7.7)
SODIUM BLD-SCNC: 137 MEQ/L (ref 135–145)
SPECIFIC GRAVITY, URINE: 1.02 (ref 1–1.03)
TROPONIN T: < 0.01 NG/ML
TROPONIN T: < 0.01 NG/ML
UROBILINOGEN, URINE: 1 EU/DL (ref 0–1)
VIBRIO CHOLERAE PCR: NOT DETECTED
VIBRIO PCR: NOT DETECTED
WBC # BLD: 3.6 THOU/MM3 (ref 4.8–10.8)
WBC UA: ABNORMAL /HPF
YEAST: ABNORMAL
YERSINIA ENTEROCOLITICA PCR: NOT DETECTED

## 2022-09-28 PROCEDURE — 80048 BASIC METABOLIC PNL TOTAL CA: CPT

## 2022-09-28 PROCEDURE — 83735 ASSAY OF MAGNESIUM: CPT

## 2022-09-28 PROCEDURE — 2580000003 HC RX 258: Performed by: STUDENT IN AN ORGANIZED HEALTH CARE EDUCATION/TRAINING PROGRAM

## 2022-09-28 PROCEDURE — 84484 ASSAY OF TROPONIN QUANT: CPT

## 2022-09-28 PROCEDURE — 93010 ELECTROCARDIOGRAM REPORT: CPT | Performed by: NUCLEAR MEDICINE

## 2022-09-28 PROCEDURE — 71046 X-RAY EXAM CHEST 2 VIEWS: CPT

## 2022-09-28 PROCEDURE — 93005 ELECTROCARDIOGRAM TRACING: CPT | Performed by: STUDENT IN AN ORGANIZED HEALTH CARE EDUCATION/TRAINING PROGRAM

## 2022-09-28 PROCEDURE — 99285 EMERGENCY DEPT VISIT HI MDM: CPT

## 2022-09-28 PROCEDURE — 83880 ASSAY OF NATRIURETIC PEPTIDE: CPT

## 2022-09-28 PROCEDURE — 81001 URINALYSIS AUTO W/SCOPE: CPT

## 2022-09-28 PROCEDURE — 36415 COLL VENOUS BLD VENIPUNCTURE: CPT

## 2022-09-28 PROCEDURE — 85025 COMPLETE CBC W/AUTO DIFF WBC: CPT

## 2022-09-28 PROCEDURE — 87635 SARS-COV-2 COVID-19 AMP PRB: CPT

## 2022-09-28 PROCEDURE — 87507 IADNA-DNA/RNA PROBE TQ 12-25: CPT

## 2022-09-28 RX ORDER — 0.9 % SODIUM CHLORIDE 0.9 %
1000 INTRAVENOUS SOLUTION INTRAVENOUS ONCE
Status: COMPLETED | OUTPATIENT
Start: 2022-09-28 | End: 2022-09-28

## 2022-09-28 RX ADMIN — SODIUM CHLORIDE 1000 ML: 9 INJECTION, SOLUTION INTRAVENOUS at 12:39

## 2022-09-28 ASSESSMENT — ENCOUNTER SYMPTOMS
RHINORRHEA: 0
SHORTNESS OF BREATH: 0
VOMITING: 0
NAUSEA: 0
BACK PAIN: 0
SORE THROAT: 0
ABDOMINAL PAIN: 1
DIARRHEA: 1
CONSTIPATION: 0
EYE REDNESS: 0

## 2022-09-28 ASSESSMENT — PAIN - FUNCTIONAL ASSESSMENT
PAIN_FUNCTIONAL_ASSESSMENT: NONE - DENIES PAIN
PAIN_FUNCTIONAL_ASSESSMENT: 0-10

## 2022-09-28 ASSESSMENT — HEART SCORE: ECG: 0

## 2022-09-28 ASSESSMENT — PAIN DESCRIPTION - ORIENTATION
ORIENTATION: LOWER
ORIENTATION: LOWER

## 2022-09-28 ASSESSMENT — PAIN SCALES - GENERAL
PAINLEVEL_OUTOF10: 7

## 2022-09-28 ASSESSMENT — PAIN DESCRIPTION - LOCATION
LOCATION: BACK
LOCATION: BACK

## 2022-09-28 NOTE — TELEPHONE ENCOUNTER
Patient developed loose stools since undergoing Cystoscopy Bilateral Retrograde Pyelogram and pelvic exam yesterday. Patient has had fecal incontinence. She has had 10-12 loose stools. She is on cipro. Patient advised she may take probiotic, drink kefir, and eat yogurt. Patient then c/o chest pain and advised to be evaluated in the ER. She voiced understanding.

## 2022-09-28 NOTE — ED NOTES
Pt. Resting in bed with even and unlabored respirations. Pt. Pain is a 7/10. Pt able to provide urine sample at this time. Pt. Updated about plan of care and treatment. Family at bedside. Pt. Has no further concerns, questions or needs at this time. Call light within reach.         Karey Araiza RN  09/28/22 [id number]

## 2022-09-28 NOTE — ED NOTES
----- Message -----  From: Cielo Che MD  Sent: 8/21/2019   9:52 AM  To: Travis Dobson RN    Labs are stable with moderate renal dysfunction. I recommend starting amlodipine 5mg daily to get his blood pressure down. Please ask him to check his BP daily at home and let us know in 2 weeks how the numbers are looking.     Thanks, EG     Pt. Resting in bed with even and unlabored respirations. Pt. States pain is a 7/10 at this time. Pt. Updated about plan for urine sample and stool sample. Pt started on fluids. Family at bedside. Pt. Has no further concerns, questions or needs at this time. Call light within reach.         Italo Marino RN  09/28/22 2473

## 2022-09-28 NOTE — DISCHARGE INSTRUCTIONS
Continue taking your Cipro antibiotic until all of your antibiotics are gone. Make sure to drink plenty of fluids. You were found to be positive for a viral cause of diarrhea. Drink plenty of fluids, eat yogurt, take a probiotic. Return to the emergency department for any worsening or new chest pain, abdominal pain, fever, or worsening symptoms.   Follow-up with your urologist.

## 2022-09-28 NOTE — ED NOTES
Pt. Resting in bed with even and unlabored respirations. Pt. States pain is a 7/10 at this time. Pt up to bathroom for GI pathogens sample. Pt unable to provide urine sample. Pt. Updated about plan of care and treatment. Family at bedside. Pt. Has no further concerns, questions or needs at this time. Call light within reach.         Karey Araiza RN  09/28/22 [id number]

## 2022-09-28 NOTE — ED NOTES
Pt. Resting in bed with even and unlabored respirations. Pt. States her back pain remains a 7/10. Pt. Updated about plan of care and treatment. Family at bedside. Pt. Has no further concerns, questions or needs at this time. Call light within reach.         Edgar Magallon RN  09/28/22 3692

## 2022-09-28 NOTE — ED NOTES
Pt arrives to the ED for intermittent chest pain and sob with activity that has been occurring for a while. Pt states she has been following up about her chest pain and has had several tests completed including a stress test. Pt states she developed chest pain around 0930 today however states she no longer has chest pain. Pt states she also had a cystoscopy completed with dr. Jillian Villasenor for kidney stones yesterday. Pt has had diarrhea since then.  Pt has lower back pain rating a 7/10     Jelena Paez RN  09/28/22 3089

## 2022-09-28 NOTE — ED NOTES
Discharge instructions  discussed and explained with Pt. Pt. Verbalized understanding and has no further questions or needs at this time.         Edgar Magallon RN  09/28/22 8698

## 2022-09-29 NOTE — ED PROVIDER NOTES
[facility] ENCOUNTER          Pt Name: Reid Bernard  MRN: [id number]  [place] 1955  Date of evaluation: 9/28/2022  Physician: Tay Cobb MD    CHIEF COMPLAINT       Chief Complaint   Patient presents with    Chest Pain    Diarrhea     History obtained from the patient. HISTORY OF PRESENT ILLNESS    HPI  Reid Bernard is a 74 y.o. female with a history of COPD, hyperlipidemia, anxiety, hypertension who presents to the emergency department for evaluation of diarrhea and chest pain. Patient states that she underwent a cystoscopy with bilateral retrograde pyelogram and pelvic exam with Dr. Ambreen Lainez with urology yesterday. She was put on Ciprofloxacin perioperatively which she has been taking. Overnight, she developed cramping abdominal pain as well as numerous episodes of nonbloody watery diarrhea. Patient also states that she has intermittent chest pain which feels like a sharp pain in the substernal region that lasts for a few minutes at a time. She denies any relieving or exacerbating factors. She does state that she has a hiatal hernia and that this has caused her similar pain from it in the past.  She has been tolerating p.o. intake. She denies any known sick contacts. The patient has no other acute complaints at this time. REVIEW OF SYSTEMS   Review of Systems   Constitutional:  Negative for chills and fever. HENT:  Negative for rhinorrhea and sore throat. Eyes:  Negative for redness and visual disturbance. Respiratory:  Negative for shortness of breath. Cardiovascular:  Positive for chest pain. Gastrointestinal:  Positive for abdominal pain and diarrhea. Negative for constipation, nausea and vomiting. Endocrine: Negative for polyuria. Genitourinary:  Negative for dysuria and flank pain. Musculoskeletal:  Negative for back pain and myalgias. Skin:  Negative for rash.    Neurological:  Negative for syncope and RATE, ESTIMATED - Abnormal; Notable for the following components:    Est, Glom Filt Rate 84 (*)     All other components within normal limits   OSMOLALITY - Abnormal; Notable for the following components:    Osmolality Calc 274.6 (*)     All other components within normal limits   URINE WITH REFLEXED MICRO - Abnormal; Notable for the following components:    Bilirubin Urine SMALL (*)     Ketones, Urine TRACE (*)     Nitrite, Urine POSITIVE (*)     Color, UA DK YELLOW (*)     All other components within normal limits   COVID-19, RAPID   BASIC METABOLIC PANEL W/ REFLEX TO MG FOR LOW K   BRAIN NATRIURETIC PEPTIDE   TROPONIN   ANION GAP   MAGNESIUM   TROPONIN   BILE ACIDS, TOTAL       Radiologic studies results:  XR CHEST (2 VW)   Final Result   1. No acute cardiopulmonary finding. **This report has been created using voice recognition software. It may contain minor errors which are inherent in voice recognition technology. **      Final report electronically signed by Dr Paul Tripathi on 9/28/2022 12:14 PM                ED COURSE   ED Medications administered this visit:   Medications   0.9 % sodium chloride bolus (0 mLs IntraVENous Stopped 9/28/22 1619)     EKG shows normal sinus rhythm with rate of 91, , QRS 74, QTc 440, normal axis, overall appearance similar to previous. Chest x-ray negative for acute process. Laboratory work-up negative including troponin x2. Stool study positive for Sapovirus. Discussed with patient that her diarrhea is likely secondary to viral infection or antibiotic reaction but C. difficile was negative. She is otherwise well-appearing, tolerating p.o. intake. Low suspicion for cardiac etiology of chest pain given atypical symptoms, EKG unchanged from previous, and recent negative stress test within the past few weeks. Patient is comfortable with plan to discharge. She was educated on probiotics, eating yogurt.   Urology on-call, Radha Fischer NP updated on results of ED evaluation and recommends patient continue/finish cipro and follow up outpatient. Patient given return precautions as well as follow up information. Patient verbalized agreement/understanding with this plan and was discharged in stable condition. MEDICATION CHANGES     Discharge Medication List as of 9/28/2022  4:11 PM            FINAL DISPOSITION     Final diagnoses:   Viral diarrhea   Chest pain, unspecified type     Condition: condition: stable  Dispo: Discharge to home      This transcription was electronically signed. It was dictated by use of voice recognition software and electronically transcribed. The transcription may contain errors not detected in proofreading.        Abebe Cedeno MD  09/28/22 3856

## 2022-10-18 ENCOUNTER — OFFICE VISIT (OUTPATIENT)
Dept: PULMONOLOGY | Age: 67
End: 2022-10-18
Payer: MEDICARE

## 2022-10-18 VITALS
BODY MASS INDEX: 32.27 KG/M2 | HEIGHT: 67 IN | HEART RATE: 66 BPM | TEMPERATURE: 97.7 F | SYSTOLIC BLOOD PRESSURE: 120 MMHG | WEIGHT: 205.6 LBS | DIASTOLIC BLOOD PRESSURE: 62 MMHG | OXYGEN SATURATION: 96 %

## 2022-10-18 DIAGNOSIS — J44.9 STAGE 2 MODERATE COPD BY GOLD CLASSIFICATION (HCC): Primary | ICD-10-CM

## 2022-10-18 DIAGNOSIS — R94.2 DECREASED DIFFUSION CAPACITY OF LUNG: ICD-10-CM

## 2022-10-18 DIAGNOSIS — E66.9 OBESITY (BMI 30-39.9): ICD-10-CM

## 2022-10-18 DIAGNOSIS — R60.0 BILATERAL LEG EDEMA: ICD-10-CM

## 2022-10-18 DIAGNOSIS — R40.0 DAYTIME SLEEPINESS: ICD-10-CM

## 2022-10-18 PROCEDURE — 1090F PRES/ABSN URINE INCON ASSESS: CPT

## 2022-10-18 PROCEDURE — 1036F TOBACCO NON-USER: CPT

## 2022-10-18 PROCEDURE — 99214 OFFICE O/P EST MOD 30 MIN: CPT

## 2022-10-18 PROCEDURE — G8484 FLU IMMUNIZE NO ADMIN: HCPCS

## 2022-10-18 PROCEDURE — 1123F ACP DISCUSS/DSCN MKR DOCD: CPT

## 2022-10-18 PROCEDURE — G8427 DOCREV CUR MEDS BY ELIG CLIN: HCPCS

## 2022-10-18 PROCEDURE — 3023F SPIROM DOC REV: CPT

## 2022-10-18 PROCEDURE — G8400 PT W/DXA NO RESULTS DOC: HCPCS

## 2022-10-18 PROCEDURE — 3017F COLORECTAL CA SCREEN DOC REV: CPT

## 2022-10-18 PROCEDURE — G8417 CALC BMI ABV UP PARAM F/U: HCPCS

## 2022-10-18 ASSESSMENT — ENCOUNTER SYMPTOMS
COUGH: 0
SPUTUM PRODUCTION: 0
CHEST TIGHTNESS: 0
HEMOPTYSIS: 0
SHORTNESS OF BREATH: 1
SINUS PRESSURE: 0
SINUS PAIN: 0
WHEEZING: 0
RHINORRHEA: 0

## 2022-10-18 ASSESSMENT — COPD QUESTIONNAIRES: COPD: 1

## 2022-10-20 ENCOUNTER — HOSPITAL ENCOUNTER (OUTPATIENT)
Dept: GENERAL RADIOLOGY | Age: 67
Discharge: HOME | End: 2022-10-20
Payer: MEDICARE

## 2022-10-20 ENCOUNTER — HOSPITAL ENCOUNTER (OUTPATIENT)
Age: 67
Discharge: HOME | End: 2022-10-20
Payer: MEDICARE

## 2022-10-20 DIAGNOSIS — N20.0 KIDNEY STONES: ICD-10-CM

## 2022-10-20 PROCEDURE — 74018 RADEX ABDOMEN 1 VIEW: CPT

## 2022-10-21 ENCOUNTER — TELEPHONE (OUTPATIENT)
Dept: SURGERY | Age: 67
End: 2022-10-21

## 2022-10-21 ENCOUNTER — HOSPITAL ENCOUNTER (OUTPATIENT)
Dept: SLEEP CENTER | Age: 67
Discharge: HOME | End: 2022-10-23
Payer: MEDICARE

## 2022-10-21 DIAGNOSIS — E66.9 CLASS 1 OBESITY WITH BODY MASS INDEX (BMI) OF 33.0 TO 33.9 IN ADULT, UNSPECIFIED OBESITY TYPE, UNSPECIFIED WHETHER SERIOUS COMORBIDITY PRESENT: ICD-10-CM

## 2022-10-21 DIAGNOSIS — R06.83 SNORING: ICD-10-CM

## 2022-10-21 DIAGNOSIS — Z72.821 POOR SLEEP HYGIENE: ICD-10-CM

## 2022-10-21 DIAGNOSIS — G47.8 SLEEP TALKING: ICD-10-CM

## 2022-10-21 DIAGNOSIS — R40.0 DAYTIME SLEEPINESS: ICD-10-CM

## 2022-10-21 PROCEDURE — 95810 POLYSOM 6/> YRS 4/> PARAM: CPT

## 2022-10-21 NOTE — TELEPHONE ENCOUNTER
Called patient and left a voicemail for her to call back to schedule [facility]  referral with Agnieszka Navarrete for second opinion

## 2022-10-24 LAB — STATUS: NORMAL

## 2022-10-25 NOTE — PROGRESS NOTES
[address] [place] [address]                               SLEEP STUDY REPORT    PATIENT NAME: Xi Tyson                  :        1955  MED REC NO:   [id number]                           ROOM:  ACCOUNT NO:   [de-identified]                           ADMIT DATE: 10/21/2022  PROVIDER:     Kedar Watkins. MD [facility]    DATE OF STUDY:  10/21/2022    The patient's height is 5 feet 6 and a half inches, weight is 210 pounds  with a BMI of 33.39. HISTORY:  The patient is a 82-year-old female who was initially  evaluated by Johny Whitaker CNP, on 2022. The patient is  currently suffering with hypersomnia with [place] Sleepiness Score of  12. The patient had increased neck size of 16 inches and class III  Mallampati airway. The patient is scheduled for sleep study to evaluate  for sleep apnea. The patient had associated comorbidities including  hypothyroidism. METHODS:  The patient underwent digital polysomnography in compliance  with the standards and specifications from the AASM Manual including the  simultaneous recording of 3 EEG channels (F4-M1, C4-M1, and O2-M1 with  back up electrodes F3-M2, C3-M2, and O1-M2), 2 EOG channels (E1-M2, and  E2-M1,), EMG (chin, left & right leg), EKG, Nonin pulse oximetry with   less than 2 second averaging time, body position, airflow recorded by  oral-nasal thermal sensor and nasal air pressure transducer, plus  respiratory effort recorded by calibrated respiratory inductance  plethysmography (RIP), flow volume loop, sound and video. Sleep staging  and scoring followed the standard put forth by the American Academy of  Sleep Medicine and utilized the 1B obstructive hypopnea event  desaturation of 4 percent or greater. INTERPRETATION:  This is a baseline sleep study and the study was  performed on 10/21/2022. The study was started at 09:36 p.m. and was  terminated at 04:53 a.m. with a total recording time of 437.8 minutes,  sleep period time was 357.7 minutes, total sleep time was 336.7 minutes,  and overall sleep efficiency was 76.9%. The sleep onset latency was  80.1 minutes, wake after sleep onset was 21 minutes, and REM sleep  latency was 155 minutes. SLEEP STAGING AND DISTRIBUTION SUMMARY:  Revealed the patient spent 32.5  minutes in stage I consisting of 9.7% of total sleep time, 247.5 minutes  in stage II consisting of 73.5%, 56.7 minutes in REM sleep consisting of  16.8%. The patient had absent slow wave sleep. RESPIRATORY EVENT ANALYSIS:  Revealed the patient had a total of 2  apneas. The 2 were obstructive in nature. The patient also had a total  of 4 hypopneas, all of them were obstructive in nature. The total  number of apneas and hypopneas recorded during the study was 6 with an  apnea-hypopnea index of 1.1. The patient's REM sleep apnea-hypopnea  index was 1.1. POSITION ANALYSIS:  Revealed the patient spent 221.5 minutes in supine  position, 36.2 minutes in left lateral position, 79 minutes in right  lateral position with a supine apnea-hypopnea index of 0.8 whereas left  lateral position apnea-hypopnea index was 1.7, and right lateral  position apnea-hypopnea index was 1.5. PERIODIC LIMB MOVEMENT ANALYSIS:  Revealed the patient did not have any  periodic limb movements. The patient had a total of 34 spontaneous  arousals with a spontaneous arousal index of 6.1. OXYGEN SATURATION MONITORING:  Revealed the patient had a maximum oxygen  desaturation to 88% with a mean oxygen saturation of 92.6%. The patient  spent a total of 0.2 minutes below oxygen saturation less than 88%. EKG MONITORING:  Revealed normal sinus rhythm. The patient found to have moderate snoring during the sleep study. IMPRESSION:  1. Moderately snoring with no clinically significant obstructive sleep  apnea.   2.  Decreased and delayed REM sleep limiting the interpretation of the  sleep study. 3.  Hypersomnia with [place] Sleepiness Score of 12 of uncertain  etiology need further evaluation. 4.  Hypothyroidism on supplementation. RECOMMENDATIONS:  The patient should be scheduled for followup with  Johnathan Garcia CNP, clinic as soon as possible to discuss about the  sleep study finding for further management. Thanks to [facility], for giving me this opportunity to  participate in the care of this pleasant lady.         Abi Whitfield MD    D: 10/24/2022 14:16:56       T: 10/25/2022 13:41:54     [email or web address]  Job#: [id number]     Doc#: [id number]    CC:

## 2022-10-26 ENCOUNTER — OFFICE VISIT (OUTPATIENT)
Dept: UROLOGY | Age: 67
End: 2022-10-26
Payer: COMMERCIAL

## 2022-10-26 DIAGNOSIS — D17.71 ANGIOMYOLIPOMA OF RIGHT KIDNEY: ICD-10-CM

## 2022-10-26 DIAGNOSIS — N20.0 NEPHROLITHIASIS: ICD-10-CM

## 2022-10-26 DIAGNOSIS — R31.0 GROSS HEMATURIA: ICD-10-CM

## 2022-10-26 DIAGNOSIS — N28.1 RENAL CYST, LEFT: Primary | ICD-10-CM

## 2022-10-26 LAB
BILIRUBIN URINE: NEGATIVE
BLOOD URINE, POC: NEGATIVE
CHARACTER, URINE: CLEAR
COLOR, URINE: YELLOW
GLUCOSE URINE: NEGATIVE MG/DL
KETONES, URINE: NEGATIVE
LEUKOCYTE CLUMPS, URINE: NEGATIVE
NITRITE, URINE: NEGATIVE
PH, URINE: 7.5 (ref 5–9)
PROTEIN, URINE: NEGATIVE MG/DL
SPECIFIC GRAVITY, URINE: 1.02 (ref 1–1.03)
UROBILINOGEN, URINE: 0.2 EU/DL (ref 0–1)

## 2022-10-26 PROCEDURE — G8428 CUR MEDS NOT DOCUMENT: HCPCS

## 2022-10-26 PROCEDURE — 81003 URINALYSIS AUTO W/O SCOPE: CPT

## 2022-10-26 PROCEDURE — 3017F COLORECTAL CA SCREEN DOC REV: CPT

## 2022-10-26 PROCEDURE — 1090F PRES/ABSN URINE INCON ASSESS: CPT

## 2022-10-26 PROCEDURE — G8484 FLU IMMUNIZE NO ADMIN: HCPCS

## 2022-10-26 PROCEDURE — G8400 PT W/DXA NO RESULTS DOC: HCPCS

## 2022-10-26 PROCEDURE — 1123F ACP DISCUSS/DSCN MKR DOCD: CPT

## 2022-10-26 PROCEDURE — 99214 OFFICE O/P EST MOD 30 MIN: CPT

## 2022-10-26 PROCEDURE — G8417 CALC BMI ABV UP PARAM F/U: HCPCS

## 2022-10-26 PROCEDURE — 1036F TOBACCO NON-USER: CPT

## 2022-10-26 RX ORDER — POTASSIUM CITRATE 10 MEQ/1
10 TABLET, EXTENDED RELEASE ORAL
Qty: 90 TABLET | Refills: 5 | Status: SHIPPED | OUTPATIENT
Start: 2022-10-26

## 2022-10-26 NOTE — PROGRESS NOTES
[address] [address]  Dept: [phone]  Loc: [phone]  Visit Date: 10/26/2022      HPI:     Bhupinder Meek is a 74 y.o. female who presents today in follow-up for stone follow-up. Patient underwent cystoscopy with bilateral retrograde pyelogram and pelvic exam on 9/27/22 by Dr. Vickey Sheridan. Procedure yielded no abnormalities related to the patency of either ureter, and no abnormal findings of the bladder mucosa. 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 Prior to her procedure with Dr. Vickey Sheridan the patient had been seen in the ED for dyspnea on exertion and fatigue an had been found to be anemic. Back on 7/6/22 she had a low in her hemoglobin of 7.9. Being followed by GI. Still attempting to detect source of bleeding per patient. Denies seeing blood in the urine in the past few days. Flank pain largely resolved. Non-descript back pain at times. Admits to two previous stone episodes dating back several years. Was able to pass stones in the past without surgical intervention. Patient does admit that she could be better with hydration. Denies excessive soda, tea, coffee intake.      UA  Lab Results   Component Value Date/Time    COLORU Yellow 10/26/2022 09:22 AM    COLORU DK YELLOW 09/28/2022 02:45 PM    LABSPEC 1.025 10/26/2022 09:22 AM    LABPH 7.50 10/26/2022 09:22 AM    NITRU Negative 10/26/2022 09:22 AM    GLUCOSEU Negative 10/26/2022 09:22 AM    KETUA Negative 10/26/2022 09:22 AM    UROBILINOGEN 0.20 10/26/2022 09:22 AM    BILIRUBINUR Negative 10/26/2022 09:22 AM    BILIRUBINUR negative 02/25/2022 04:00 PM          Cystoscopy Bilateral Retrograde Pyelogram and pelvic exam (9/27/22)    Pertinent Procedure Note Findings  \"A 25 Welsh cystoscope with a 30-degree lens was placed through  the urethra into the bladder. The right ureteral orifice was cannulated with a 5 French ureteral catheter. Contrast was injected and the right ureter and renal pelvis were identified, with  no abnormalities or filling defects. The catheter was removed and placed in the left ureteral orifice. The procedure was repeated once again with  no abnormalities or filling defects present. A thorough and complete cystoscopy was performed with no abnormalities involving the bladder or mucosa. \"    Current Outpatient Medications   Medication Sig Dispense Refill    albuterol sulfate HFA (PROVENTIL HFA) 108 (90 Base) MCG/ACT inhaler Inhale 2 puffs into the lungs every 6 hours as needed for Wheezing 18 g 3    pantoprazole (PROTONIX) 40 MG tablet Take 40 mg by mouth 2 times daily      levothyroxine (SYNTHROID) 50 MCG tablet take 1 tablet by mouth once daily 30 tablet 11    valACYclovir (VALTREX) 500 MG tablet Take 1 tablet by mouth daily 30 tablet 11    valsartan-hydroCHLOROthiazide (DIOVAN-HCT) 160-12.5 MG per tablet Take 1 tablet by mouth daily 30 tablet 11    citalopram (CELEXA) 40 MG tablet Take 1 tablet by mouth daily 30 tablet 11     No current facility-administered medications for this visit. Past Medical History  Shola Gonzalez  has a past medical history of Arthritis of left knee, Hiatal hernia, History of blood transfusion, History of kidney stones, Hypertension, Hypothyroidism, and Renal cyst.    Past Surgical History  The patient  has a past surgical history that includes Tonsillectomy; Hysterectomy (2009); Thyroid surgery (01/05/2011); Ankle arthroscopy (Left, 05/15/2017); Endoscopy, colon, diagnostic (08/09/2022); Colonoscopy (08/23/2022); Knee arthroscopy (Left, 07/20/2021); Foot surgery (Right, 04/2022); and Cystoscopy (Bilateral, 9/27/2022).     Family History  This patient's family history includes Arthritis in her mother; Birth Defects in her brother and father; Breast Cancer (age of onset: 64) in her maternal cousin; Breast Cancer (age of onset: 80) in her mother; Diabetes in her maternal aunt; High Blood Pressure in her mother. Social History  Simon Lebron  reports that she has never smoked. She has never used smokeless tobacco. She reports current alcohol use. She reports that she does not use drugs. Subjective:   REVIEW OF SYSTEMS:  Constitutional: negative  Eyes: negative  Respiratory: negative  Cardiovascular: negative  Gastrointestinal: negative  Musculoskeletal: negative  Genitourinary: negative except for what is in HPI  Skin: negative   Neurological: negative  Hematological/Lymphatic: negative  Psychological: negative    Objective:     PE:   There were no vitals filed for this visit. Constitutional:       No acute distress, cooperative and answering questions appropriately. Cardiovascular:        Normal rate and regular rhythm. Pulmonary/Chest:       Normal respiratory rate and rhthym. No use of accessory muscles. Abdominal:        Soft. No tenderness. Bowel sounds present. Neurological:        Alert and oriented to person, place, time, and situation. Psychiatric:        Normal mood and affect. Genitourinary:        No CVA tenderness. Bladder non-distended. Recent BUN/Creatinine:  Lab Results   Component Value Date/Time    BUN 15 09/28/2022 11:25 AM    CREATININE 0.7 09/28/2022 11:25 AM            Assessment & Plan:   Left nephrolithiasis  - Originally picked up on CT. Not detected on follow-up KUB. - Litholink study done. Showed low volume, high citrate levels and high pH levels. - Encouraged improvement in hydration.   - Potassium citrate 10 mEq TID with meals.   - Repeat Litholink prior to follow-up appointment in 6 months. Gross hematuria  - Cystoscopy and bilateral retrograde pyelogram showed no abnormalities with bladder mucosa or either ureter. - UA negative. No blood or signs of infection.  - Gross hematuria resolved. No CVA tenderness or flank pain.     Left renal cyst  Right AML  - Small AML. - Stable left renal cyst.  - Dr. Carolyn Mays aware. Saw patient at last appointment. - Surveillance in 6 months with CT to be discussed at follow-up appointment.     Ayse Whiteside PA-C  Urology

## 2022-10-26 NOTE — PATIENT INSTRUCTIONS
Follow-up in 6 months with Melvin and CT. Potassium Citrate added. Increase hydration to > 60 oz. Clear fluids.

## 2022-10-27 ENCOUNTER — TELEPHONE (OUTPATIENT)
Dept: PULMONOLOGY | Age: 67
End: 2022-10-27

## 2022-10-27 NOTE — TELEPHONE ENCOUNTER
----- Message from JANEY Wilks CNP sent at 10/23/2022  8:19 PM EDT -----  Please notify patient of normal A1A screening. Thanks!

## 2022-10-31 ENCOUNTER — OFFICE VISIT (OUTPATIENT)
Dept: CARDIOLOGY CLINIC | Age: 67
End: 2022-10-31
Payer: COMMERCIAL

## 2022-10-31 ENCOUNTER — TELEPHONE (OUTPATIENT)
Dept: UROLOGY | Age: 67
End: 2022-10-31

## 2022-10-31 VITALS
HEIGHT: 67 IN | WEIGHT: 208 LBS | BODY MASS INDEX: 32.65 KG/M2 | HEART RATE: 76 BPM | DIASTOLIC BLOOD PRESSURE: 71 MMHG | SYSTOLIC BLOOD PRESSURE: 119 MMHG

## 2022-10-31 DIAGNOSIS — R06.00 DYSPNEA, UNSPECIFIED TYPE: Primary | ICD-10-CM

## 2022-10-31 PROCEDURE — G8427 DOCREV CUR MEDS BY ELIG CLIN: HCPCS | Performed by: INTERNAL MEDICINE

## 2022-10-31 PROCEDURE — 1090F PRES/ABSN URINE INCON ASSESS: CPT | Performed by: INTERNAL MEDICINE

## 2022-10-31 PROCEDURE — 1123F ACP DISCUSS/DSCN MKR DOCD: CPT | Performed by: INTERNAL MEDICINE

## 2022-10-31 PROCEDURE — 1036F TOBACCO NON-USER: CPT | Performed by: INTERNAL MEDICINE

## 2022-10-31 PROCEDURE — G8400 PT W/DXA NO RESULTS DOC: HCPCS | Performed by: INTERNAL MEDICINE

## 2022-10-31 PROCEDURE — G8484 FLU IMMUNIZE NO ADMIN: HCPCS | Performed by: INTERNAL MEDICINE

## 2022-10-31 PROCEDURE — 3017F COLORECTAL CA SCREEN DOC REV: CPT | Performed by: INTERNAL MEDICINE

## 2022-10-31 PROCEDURE — 99214 OFFICE O/P EST MOD 30 MIN: CPT | Performed by: INTERNAL MEDICINE

## 2022-10-31 PROCEDURE — G8417 CALC BMI ABV UP PARAM F/U: HCPCS | Performed by: INTERNAL MEDICINE

## 2022-10-31 RX ORDER — FUROSEMIDE 20 MG/1
20 TABLET ORAL DAILY PRN
Qty: 90 TABLET | Refills: 1 | Status: SHIPPED | OUTPATIENT
Start: 2022-10-31 | End: 2022-11-04 | Stop reason: SDUPTHER

## 2022-10-31 RX ORDER — POTASSIUM CHLORIDE 750 MG/1
10 TABLET, EXTENDED RELEASE ORAL DAILY
Qty: 90 TABLET | Refills: 1 | Status: SHIPPED | OUTPATIENT
Start: 2022-10-31 | End: 2022-10-31

## 2022-10-31 NOTE — PROGRESS NOTES
[address] [place] [address]  Dept: [phone]  Dept Fax: [phone]  Loc: [phone]    Visit Date: 10/31/2022    Ms. Monica Merino is a 74 y.o. female  who presented for:  Chief Complaint   Patient presents with    Check-Up     Fu stress       HPI:   75 yo F c hx of hypothyrodism, SANTANA (Hb 9.8/34) is here for evaluation of shortness of breath and fatigue. Patient has been following up with hematologist/gastroenterologist and has been getting blood transfusion and iron infusion. EKG shows SR, NSST. Echo recently showed EF 60%, no WMA. Recently had stress test which showed no ischemia. Reports intermittent leg swelling         Current Outpatient Medications:     furosemide (LASIX) 20 MG tablet, Take 1 tablet by mouth daily as needed (leg swelling), Disp: 90 tablet, Rfl: 1    potassium citrate (UROCIT-K) 10 MEQ (1080 MG) extended release tablet, Take 1 tablet by mouth 3 times daily (with meals), Disp: 90 tablet, Rfl: 5    albuterol sulfate HFA (PROVENTIL HFA) 108 (90 Base) MCG/ACT inhaler, Inhale 2 puffs into the lungs every 6 hours as needed for Wheezing, Disp: 18 g, Rfl: 3    pantoprazole (PROTONIX) 40 MG tablet, Take 40 mg by mouth 2 times daily, Disp: , Rfl:     levothyroxine (SYNTHROID) 50 MCG tablet, take 1 tablet by mouth once daily, Disp: 30 tablet, Rfl: 11    valsartan-hydroCHLOROthiazide (DIOVAN-HCT) 160-12.5 MG per tablet, Take 1 tablet by mouth daily, Disp: 30 tablet, Rfl: 11    citalopram (CELEXA) 40 MG tablet, Take 1 tablet by mouth daily, Disp: 30 tablet, Rfl: 11    Past Medical History  Liv Andres  has a past medical history of Arthritis of left knee, Hiatal hernia, History of blood transfusion, History of kidney stones, Hypertension, Hypothyroidism, and Renal cyst.    Social History  Liv Andres  reports that she has never smoked. She has never used smokeless tobacco. She reports current alcohol use.  She reports that she does not use drugs. Family History  Sofya family history includes Arthritis in her mother; Birth Defects in her brother and father; Breast Cancer (age of onset: 64) in her maternal cousin; Breast Cancer (age of onset: 80) in her mother; Diabetes in her maternal aunt; High Blood Pressure in her mother. Past Surgical History   Past Surgical History:   Procedure Laterality Date    ANKLE ARTHROSCOPY Left 05/15/2017    COLONOSCOPY  08/23/2022    CYSTOSCOPY Bilateral 9/27/2022    Cystoscopy Bilateral Retrograde Pyelogram and pelvic exam performed by Charity Osei MD at [facility], COLON, DIAGNOSTIC  08/09/2022    FOOT SURGERY Right 04/2022    HYSTERECTOMY (CERVIX STATUS UNKNOWN)  2009    KNEE ARTHROSCOPY Left 07/20/2021    THYROID SURGERY  01/05/2011    TONSILLECTOMY         Subjective:     REVIEW OF SYSTEMS  Constitutional: denies sweats, chills and fever  HENT: denies  congestion, sinus pressure, sneezing and sore throat. Eyes: denies  pain, discharge, redness and itching. Respiratory: denies apnea, cough  Gastrointestinal: denies blood in stool, constipation, diarrhea   Endocrine: denies cold intolerance, heat intolerance, polydipsia. Genitourinary: denies dysuria, enuresis, flank pain and hematuria. Musculoskeletal: denies arthralgias, joint swelling and neck pain. Neurological: denies numbness and headaches. Psychiatric/Behavioral: denies agitation, confusion, decreased concentration and dysphoric mood    All others reviewed and are negative. Objective:     /71   Pulse 76   Ht 5' 7\" (1.702 m)   Wt 208 lb (94.3 kg)   BMI 32.58 kg/m²     Wt Readings from Last 3 Encounters:   10/31/22 208 lb (94.3 kg)   10/18/22 205 lb 9.6 oz (93.3 kg)   09/28/22 200 lb (90.7 kg)     BP Readings from Last 3 Encounters:   10/31/22 119/71   10/18/22 120/62   09/28/22 131/71       PHYSICAL EXAM  Constitutional: Oriented to person, place, and time.  Appears well-developed and well-nourished. HENT:   Head: Normocephalic and atraumatic. Eyes: EOM are normal. Pupils are equal, round, and reactive to light. Neck: Normal range of motion. Neck supple. No JVD present. Cardiovascular: Normal rate , normal heart sounds and intact distal pulses. Pulmonary/Chest: Effort normal and breath sounds normal. No respiratory distress. No wheezes. No rales. Abdominal: Soft. Bowel sounds are normal. No distension. There is no tenderness. Musculoskeletal: Normal range of motion. No edema. Neurological: Alert and oriented to person, place, and time. No cranial nerve deficit. Coordination normal.   Skin: Skin is warm and dry. Psychiatric: Normal mood and affect.        No results found for: CKTOTAL, CKMB, CKMBINDEX    Lab Results   Component Value Date/Time    WBC 3.6 09/28/2022 11:25 AM    RBC 4.19 09/28/2022 11:25 AM    HGB 10.7 09/28/2022 11:25 AM    HCT 35.4 09/28/2022 11:25 AM    MCV 84.5 09/28/2022 11:25 AM    MCH 25.5 09/28/2022 11:25 AM    MCHC 30.2 09/28/2022 11:25 AM    RDW 12.7 05/05/2017 04:25 PM     09/28/2022 11:25 AM    MPV 10.1 09/28/2022 11:25 AM       Lab Results   Component Value Date/Time     09/28/2022 11:25 AM    K 4.5 09/28/2022 11:25 AM     09/28/2022 11:25 AM    CO2 24 09/28/2022 11:25 AM    BUN 15 09/28/2022 11:25 AM    LABALBU 4.1 08/28/2022 11:50 AM    LABALBU 4.2 08/28/2022 11:50 AM    CREATININE 0.7 09/28/2022 11:25 AM    CALCIUM 9.0 09/28/2022 11:25 AM    LABGLOM 84 09/28/2022 11:25 AM    GLUCOSE 98 09/28/2022 11:25 AM    GLUCOSE 93 11/11/2011 11:20 AM       Lab Results   Component Value Date/Time    ALKPHOS 91 08/28/2022 11:50 AM    ALKPHOS 90 08/28/2022 11:50 AM    ALT 16 08/28/2022 11:50 AM    ALT 14 08/28/2022 11:50 AM    AST 17 08/28/2022 11:50 AM    AST 16 08/28/2022 11:50 AM    PROT 6.4 08/28/2022 11:50 AM    PROT 6.5 08/28/2022 11:50 AM    BILITOT 0.3 08/28/2022 11:50 AM    BILITOT 0.2 08/28/2022 11:50 AM    BILIDIR <0.2 08/28/2022 11:50 AM    LABALBU 4.1 08/28/2022 11:50 AM    LABALBU 4.2 08/28/2022 11:50 AM       Lab Results   Component Value Date/Time    MG 2.1 09/28/2022 11:25 AM       Lab Results   Component Value Date    INR 0.98 06/30/2022    INR 1.06 09/29/2016         No results found for: LABA1C    Lab Results   Component Value Date/Time    TRIG 72 03/04/2022 10:05 AM    HDL 76 03/04/2022 10:05 AM    LDLCALC 112 03/04/2022 10:05 AM       Lab Results   Component Value Date/Time    TSH 1.050 08/28/2022 11:50 AM         Testing Reviewed:      I haveindividually reviewed the below cardiac tests    EKG:    ECHO: Results for orders placed during the hospital encounter of 08/28/22    ECHO Complete 2D W Doppler W Color    Narrative  Transthoracic Echocardiography Report (TTE)    Demographics    Patient Name   [facility] Gender              Female  A    MR #           [id number]       Race                    Ethnicity    Account #      [de-identified]       Room Number         2233    Accession      [id number]      Date of Study       08/29/2022  Number    Date of Birth  1955      Referring Physician Barry Bal DO  [address]  [facility]    Age            74 year(s)      [address] [facility]    Interpreting        Echo reader of the week  Physician           Joe Ceballos MD    Procedure    Type of Study    TTE procedure:ECHOCARDIOGRAM COMPLETE 2D W DOPPLER W COLOR. Procedure Date  Date: 08/29/2022 Start: 08:53 AM    Study Location: Bedside  Technical Quality: Adequate visualization    Indications:Dyspnea on exertion and Lower extremity edema. Additional Medical History:Dyspnea, anemia, anxiety, hypothyroidism    Patient Status: Routine    Height: 67 inches Weight: 202 pounds BSA: 2.03 m^2 BMI: 31.64 kg/m^2    BP: 130/60 mmHg    Conclusions    Summary  Ejection fraction is visually estimated at 60%.   Overall left ventricular function is normal.    Signature    ----------------------------------------------------------------  Electronically signed by Benjamin Lara MD (Interpreting  physician) on 08/29/2022 at 03:59 PM  ----------------------------------------------------------------    Findings    Mitral Valve  Trace mitral regurgitation is present. Aortic Valve  The aortic valve was trileaflet with normal thickness and cuspal  separation. DOPPLER: Transaortic velocity was within the normal range with  no evidence of aortic stenosis. There was no evidence of aortic  regurgitation. Tricuspid Valve  Trivial tricuspid regurgitation visualized. Pulmonic Valve  The pulmonic valve was not well visualized . Trivial pulmonic regurgitation visualized. Left Atrium  Left atrial size was normal.    Left Ventricle  Ejection fraction is visually estimated at 60%. Overall left ventricular function is normal.    Right Atrium  Right atrial size was normal.    Right Ventricle  The right ventricular size was normal with normal systolic function and  wall thickness. Pericardial Effusion  The pericardium was normal in appearance with no evidence of a pericardial  effusion. Pleural Effusion  No evidence of pleural effusion. Aorta / Great Vessels  -Aortic root dimension within normal limits.  -The Pulmonary artery is within normal limits. -IVC size is within normal limits with normal respiratory phasic changes.     M-Mode/2D Measurements & Calculations    LV Diastolic   LV Systolic Dimension:    AV Cusp Separation: 1.8 cmLA  Dimension: 4.9 3.4 cm                    Dimension: 4.7 cmAO Root  cm             LV Volume Diastolic: 035  Dimension: 2.8 cmLA Area: 17.2  LV FS:30.6 %   ml                        cm^2  LV PW          LV Volume Systolic: 67.3  Diastolic: 0.9 ml  cm             LV EDV/LV EDV Index: 113  Septum         ml/56 m^2LV ESV/LV ESV    RV Diastolic Dimension: 2.3 cm  Diastolic: 1   Index: 93.1 ml/23 m^2  cm             EF Calculated: 58.1 %     LA/Aorta: 1.68    LA volume/Index: 49.1 ml /24m^2    Doppler Measurements & Calculations    MV Peak E-Wave: 86.1 cm/s  AV Peak Velocity: 150 LVOT Peak Velocity: 130  MV Peak A-Wave: 107 cm/s   cm/s                  cm/s  MV E/A Ratio: 0.8          AV Peak Gradient: 9   LVOT Peak Gradient: 7  MV Peak Gradient: 2.97     mmHg                  mmHg  mmHg  TV Peak E-Wave: 49.7 cm/s  MV Deceleration Time: 194                        TV Peak A-Wave: 39 cm/s  msec  MV P1/2t: 57 msec          IVRT: 85 msec         TV Peak Gradient: 0.99  MVA by PHT:3.86 cm^2                             mmHg  TR Velocity:203 cm/s  MV E' Septal Velocity: 5.4 AV DVI (Vmax):0.87    TR Gradient:16.48 mmHg  cm/s                                             PV Peak Velocity: 65.6  MV A' Septal Velocity: 7.4                       cm/s  cm/s                                             PV Peak Gradient: 1.72  MV E' Lateral Velocity:                          mmHg  7.8 cm/s  MV A' Lateral Velocity:  13.7 cm/s                                        RI ED Velocity: 83.8 cm/s  E/E' septal: 15.94  E/E' lateral: 11.04  MR Velocity: 346 cm/s    [id number]      STRESS:    CATH:    Assessment/Plan       Diagnosis Orders   1. Dyspnea, unspecified type            Shortness of breath/fatigue  SANTANA  Abnormal EKG    EKG with nonspecific ST-T changes  Has fatigue and shortness of breath and dyspnea on exertion  Had already been evaluated by hem and GI  Discussed EKG, Echo  Exercise stress test showed no ischemia. Refer to sleep clinic  Will give lasix 20mg daily  The patient is asked to make an attempt to improve diet and exercise patterns to aid in medical management of this problem.   Advised more plant based nutrition/meditarrean diet   Advised patient to call office or seek immediate medical attention if there is any new onset of  any chest pain, sob, palpitations, lightheadedness, dizziness, orthopnea, PND or pedal edema. All medication side effects were discussed in details. Thank youfor allowing me to participate in the care of this patient. Please do not hesitate to contact me for any further questions. Return in about 9 months (around 7/31/2023), or if symptoms worsen or fail to improve, for Review testing, Regular follow up.        Electronically signed by Alphonzo Bernheim, MD [facility]  10/31/2022 at 8:42 AM EDT

## 2022-10-31 NOTE — TELEPHONE ENCOUNTER
Patient would like to know if there is something else that can be ordered in place of potassium citrate. Patient advised of the Good RX coupons, but it still to expensive.

## 2022-10-31 NOTE — TELEPHONE ENCOUNTER
Best price for 90 tablets (1 month supply) that could be found was $32 at [facility]. Complex patient history with recent suspected GI bleed so diet is difficult to adjust for therapeutic prevention of stones given her Litholink results. Advise that diet may end up costing more and causing harm regarding GI history.

## 2022-10-31 NOTE — PROGRESS NOTES
Pt here for  fu stress     Pt may have up coming hernia surgery , has upcoming appt with    Dr. Otoniel Huber at the end of the month.     Pt continues with swelling in legs and feet, sob on exertion

## 2022-11-03 ENCOUNTER — OFFICE VISIT (OUTPATIENT)
Dept: PULMONOLOGY | Age: 67
End: 2022-11-03
Payer: COMMERCIAL

## 2022-11-03 VITALS
HEART RATE: 70 BPM | WEIGHT: 203.8 LBS | DIASTOLIC BLOOD PRESSURE: 72 MMHG | OXYGEN SATURATION: 93 % | HEIGHT: 67 IN | SYSTOLIC BLOOD PRESSURE: 120 MMHG | BODY MASS INDEX: 31.99 KG/M2 | TEMPERATURE: 98.3 F

## 2022-11-03 DIAGNOSIS — R09.89 CHEST CONGESTION: ICD-10-CM

## 2022-11-03 DIAGNOSIS — R06.83 SNORING: Primary | ICD-10-CM

## 2022-11-03 DIAGNOSIS — G47.8 ABNORMAL REM SLEEP: ICD-10-CM

## 2022-11-03 DIAGNOSIS — J44.9 STAGE 2 MODERATE COPD BY GOLD CLASSIFICATION (HCC): ICD-10-CM

## 2022-11-03 PROCEDURE — 1123F ACP DISCUSS/DSCN MKR DOCD: CPT

## 2022-11-03 PROCEDURE — G8400 PT W/DXA NO RESULTS DOC: HCPCS

## 2022-11-03 PROCEDURE — 3023F SPIROM DOC REV: CPT

## 2022-11-03 PROCEDURE — 99214 OFFICE O/P EST MOD 30 MIN: CPT

## 2022-11-03 PROCEDURE — 1090F PRES/ABSN URINE INCON ASSESS: CPT

## 2022-11-03 PROCEDURE — 1036F TOBACCO NON-USER: CPT

## 2022-11-03 PROCEDURE — G8484 FLU IMMUNIZE NO ADMIN: HCPCS

## 2022-11-03 PROCEDURE — 3017F COLORECTAL CA SCREEN DOC REV: CPT

## 2022-11-03 PROCEDURE — G8427 DOCREV CUR MEDS BY ELIG CLIN: HCPCS

## 2022-11-03 PROCEDURE — G8417 CALC BMI ABV UP PARAM F/U: HCPCS

## 2022-11-03 ASSESSMENT — ENCOUNTER SYMPTOMS
SHORTNESS OF BREATH: 0
SINUS PAIN: 0
COUGH: 1
WHEEZING: 0
RHINORRHEA: 0
SINUS PRESSURE: 0

## 2022-11-03 NOTE — PROGRESS NOTES
valsartan-hydroCHLOROthiazide (DIOVAN-HCT) 160-12.5 MG per tablet Take 1 tablet by mouth daily 30 tablet 11    citalopram (CELEXA) 40 MG tablet Take 1 tablet by mouth daily 30 tablet 11     No current facility-administered medications for this visit. ROS  Review of Systems   Constitutional:  Negative for appetite change and fever. HENT:  Negative for congestion, postnasal drip, rhinorrhea, sinus pressure, sinus pain and sneezing. Respiratory:  Positive for cough (congested cough). Negative for shortness of breath and wheezing. Cardiovascular:  Negative for chest pain, palpitations and leg swelling. Allergic/Immunologic: Negative for environmental allergies. Exam  Vitals -  /72 (Site: Left Upper Arm, Position: Sitting, Cuff Size: Medium Adult)   Pulse 70   Temp 98.3 °F (36.8 °C) (Skin)   Ht 5' 7\" (1.702 m)   Wt 203 lb 12.8 oz (92.4 kg)   SpO2 93%   BMI 31.92 kg/m²    Body mass index is 31.92 kg/m². Oxygen level - Room Air  Physical Exam  Constitutional:       General: She is not in acute distress. Appearance: She is well-developed. Comments: BMI 31.9   HENT:      Head: Normocephalic and atraumatic. Right Ear: External ear normal.      Left Ear: External ear normal.      Mouth/Throat:      Mouth: Mucous membranes are moist.      Pharynx: Oropharynx is clear. No oropharyngeal exudate. Eyes:      General:         Right eye: No discharge. Left eye: No discharge. Cardiovascular:      Rate and Rhythm: Normal rate and regular rhythm. Pulmonary:      Effort: Pulmonary effort is normal. No respiratory distress. Breath sounds: Rhonchi present. No wheezing or rales. Chest:      Chest wall: No tenderness. Musculoskeletal:      Cervical back: Neck supple. Neurological:      General: No focal deficit present. Mental Status: She is alert.    Psychiatric:         Mood and Affect: Mood normal.         Behavior: Behavior normal.         Thought Content: Thought content normal.         Judgment: Judgment normal.      Assessment   Diagnosis Orders   1. Snoring        2. Abnormal REM sleep        3. Chest congestion        4. Stage 2 moderate COPD by GOLD classification ([address]           Recommendations  -Reviewed results of sleep study with patient with no clinically significant sleep apnea. Patient is relieved to hear this news. She denies any difficulty with her sleep and denies daytime sleepiness. She does admit to snoring. I did offer patient a referral to ENT for further evaluation of snoring, however she has no other symptoms and does not wish to pursue this at this time. Advised patient of decreased and delayed REM sleep and that this may be related to Celexa. Patient does not wish to pursue any further treatment from the sleep clinic. She verbalized understanding regarding sleep study results. Advised patient to return with difficulty with her sleep.  -Advised patient to continue Mucinex for chest congestion. Advised patient to call if this does not resolve.  -Patient is to keep her scheduled appointment in the pulmonary clinic with pulmonary function test prior for her moderate COPD. Symptoms are currently well controlled with albuterol as needed. She has not required albuterol and has not had a COPD flare. She is to continue albuterol 2 puffs every 6 hours as needed for shortness of breath or wheezing.     Will have patient follow-up in clinic as needed    Electronically signed by JANEY Crain CNP on 11/3/2022 at 9:51 AM

## 2022-11-04 RX ORDER — FUROSEMIDE 20 MG/1
20 TABLET ORAL DAILY PRN
Qty: 90 TABLET | Refills: 1 | Status: SHIPPED | OUTPATIENT
Start: 2022-11-04

## 2022-12-01 ENCOUNTER — TELEPHONE (OUTPATIENT)
Dept: UROLOGY | Age: 67
End: 2022-12-01

## 2022-12-01 NOTE — TELEPHONE ENCOUNTER
Per Dr. Zeus Harris office pt needs an appointment with Dr. Adam dillon to call the office to schedule

## 2022-12-06 NOTE — TELEPHONE ENCOUNTER
Pt sees Dr. Mike Flynn and was advised that Dr. Tiffany Mobley would follow plan given by Dr. Mike Flynn voiced understanding but was offered an appointment with Dr. Tiffany Mobley if she wanted it and she said that she will call back if she decides to

## 2022-12-07 ENCOUNTER — TELEPHONE (OUTPATIENT)
Dept: CARDIOLOGY CLINIC | Age: 67
End: 2022-12-07

## 2022-12-07 NOTE — TELEPHONE ENCOUNTER
Received a fax from [facility] that they are requesting last office note and testing results. Last OV note along with stress and echo faxed.

## 2023-01-20 ENCOUNTER — TELEPHONE (OUTPATIENT)
Dept: UROLOGY | Age: 68
End: 2023-01-20

## 2023-01-20 DIAGNOSIS — N28.1 RENAL CYST, LEFT: Primary | ICD-10-CM

## 2023-01-20 DIAGNOSIS — D17.71 ANGIOMYOLIPOMA OF RIGHT KIDNEY: ICD-10-CM

## 2023-01-20 NOTE — TELEPHONE ENCOUNTER
Patient scheduled for CT ABD PELV W IV  at [facility] on 4/19/23 ARRIVAL OF 1030AM FOR AN 11AM SCAN. NPO 4 HOURS PRIOR TO SCAN.     Order mailed with instructions  to the patient

## 2023-03-06 PROBLEM — R79.89 ELEVATED CORTISOL LEVEL: Status: ACTIVE | Noted: 2023-03-06

## 2023-04-28 ENCOUNTER — HOSPITAL ENCOUNTER (OUTPATIENT)
Dept: WOMENS IMAGING | Age: 68
Discharge: HOME | End: 2023-04-28
Payer: MEDICARE

## 2023-04-28 DIAGNOSIS — Z78.0 ASYMPTOMATIC MENOPAUSAL STATE: ICD-10-CM

## 2023-04-28 PROCEDURE — 77080 DXA BONE DENSITY AXIAL: CPT

## 2023-05-17 ENCOUNTER — HOSPITAL ENCOUNTER (OUTPATIENT)
Dept: CT IMAGING | Age: 68
Discharge: HOME | End: 2023-05-17
Payer: MEDICARE

## 2023-05-17 ENCOUNTER — HOSPITAL ENCOUNTER (OUTPATIENT)
Age: 68
Discharge: HOME | End: 2023-05-17
Payer: MEDICARE

## 2023-05-17 DIAGNOSIS — D17.71 ANGIOMYOLIPOMA OF RIGHT KIDNEY: ICD-10-CM

## 2023-05-17 DIAGNOSIS — N28.1 RENAL CYST, LEFT: ICD-10-CM

## 2023-05-17 LAB
CREAT BLD-MCNC: 1 MG/DL (ref 0.5–1.2)
GFR SERPL CREATININE-BSD FRML MDRD: > 60 ML/MIN/1.73M2

## 2023-05-17 PROCEDURE — 74177 CT ABD & PELVIS W/CONTRAST: CPT

## 2023-05-17 PROCEDURE — 6360000004 HC RX CONTRAST MEDICATION

## 2023-05-17 PROCEDURE — 82565 ASSAY OF CREATININE: CPT

## 2023-05-17 RX ADMIN — IOPAMIDOL 85 ML: 755 INJECTION, SOLUTION INTRAVENOUS at 10:11

## 2023-05-31 ENCOUNTER — OFFICE VISIT (OUTPATIENT)
Dept: UROLOGY | Age: 68
End: 2023-05-31
Payer: MEDICARE

## 2023-05-31 VITALS
BODY MASS INDEX: 27.49 KG/M2 | HEIGHT: 65 IN | SYSTOLIC BLOOD PRESSURE: 138 MMHG | WEIGHT: 165 LBS | DIASTOLIC BLOOD PRESSURE: 60 MMHG

## 2023-05-31 DIAGNOSIS — N28.1 RENAL CYST, LEFT: Primary | ICD-10-CM

## 2023-05-31 DIAGNOSIS — N20.0 NEPHROLITHIASIS: ICD-10-CM

## 2023-05-31 DIAGNOSIS — D17.71 ANGIOMYOLIPOMA OF RIGHT KIDNEY: ICD-10-CM

## 2023-05-31 PROCEDURE — 99214 OFFICE O/P EST MOD 30 MIN: CPT | Performed by: UROLOGY

## 2023-05-31 PROCEDURE — 1123F ACP DISCUSS/DSCN MKR DOCD: CPT | Performed by: UROLOGY

## 2023-05-31 PROCEDURE — 1036F TOBACCO NON-USER: CPT | Performed by: UROLOGY

## 2023-05-31 PROCEDURE — G8399 PT W/DXA RESULTS DOCUMENT: HCPCS | Performed by: UROLOGY

## 2023-05-31 PROCEDURE — G8427 DOCREV CUR MEDS BY ELIG CLIN: HCPCS | Performed by: UROLOGY

## 2023-05-31 PROCEDURE — 1090F PRES/ABSN URINE INCON ASSESS: CPT | Performed by: UROLOGY

## 2023-05-31 PROCEDURE — 3017F COLORECTAL CA SCREEN DOC REV: CPT | Performed by: UROLOGY

## 2023-05-31 PROCEDURE — G8417 CALC BMI ABV UP PARAM F/U: HCPCS | Performed by: UROLOGY

## 2023-05-31 NOTE — PROGRESS NOTES
[address] [address]  Dept: [phone]  Loc: [phone]  Visit Date: 5/31/2023      HPI:     Sariah Aj is a 74 y.o. female who presents today in follow-up for stone follow-up. Patient underwent cystoscopy with bilateral retrograde pyelogram and pelvic exam on 9/27/22 by Dr. Alvina Serrano. Procedure yielded no abnormalities related to the patency of either ureter, and no abnormal findings of the bladder mucosa. 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 Prior to her procedure with Dr. Alvina Serrano the patient had been seen in the ED for dyspnea on exertion and fatigue an had been found to be anemic. Back on 7/6/22 she had a low in her hemoglobin of 7.9. Being followed by GI. Still attempting to detect source of bleeding per patient. I independently reviewed and verified the images and reports from:    CT ABDOMEN PELVIS W IV CONTRAST Additional Contrast? None    Result Date: 5/17/2023  PROCEDURE: CT ABDOMEN PELVIS W IV CONTRAST CLINICAL INFORMATION: Renal cyst. Angiomyolipoma of right kidney. COMPARISON: CT abdomen and pelvis 8/28/2022. TECHNIQUE: Axial 5 mm CT images were obtained through the abdomen and pelvis after the administration of 85  cc Isovue 370 intravenous contrast. Coronal and sagittal reconstructions were obtained. All CT scans at this facility use dose modulation, iterative reconstruction, and/or weight-based dosing when appropriate to reduce radiation dose to as low as reasonably achievable. FINDINGS: Lung bases: Dependent atelectasis is present. A benign calcified left lower lobe granuloma is unchanged. Liver/gallbladder/bilary tree: A 6 mm simple cyst in the dome of the right hepatic lobe is stable (series 2, image 6). Hepatic steatosis is unchanged.  No radiopaque

## 2023-06-01 ENCOUNTER — TELEPHONE (OUTPATIENT)
Dept: UROLOGY | Age: 68
End: 2023-06-01

## 2023-06-01 DIAGNOSIS — N20.0 NEPHROLITHIASIS: Primary | ICD-10-CM

## 2023-06-01 DIAGNOSIS — Z01.818 PRE-OP TESTING: ICD-10-CM

## 2023-06-01 DIAGNOSIS — R31.0 GROSS HEMATURIA: ICD-10-CM

## 2023-06-01 DIAGNOSIS — N28.1 RENAL CYST, LEFT: ICD-10-CM

## 2023-06-01 NOTE — TELEPHONE ENCOUNTER
Patient scheduled for surgery with Dr Denae Fleming on 7/3/23. Surgery consent on arrival. Patient to do pre op urine culture and fasting labs on 6/22/23. Dr Davi Burger to clear. Surgery instructions mailed to the patient. Patient will have an adult over the age of 25 with them at discharge and 24 hours after procedure.

## 2023-06-02 ENCOUNTER — TELEPHONE (OUTPATIENT)
Dept: UROLOGY | Age: 68
End: 2023-06-02

## 2023-06-02 DIAGNOSIS — R31.0 GROSS HEMATURIA: ICD-10-CM

## 2023-06-02 DIAGNOSIS — Z01.818 PRE-OP TESTING: ICD-10-CM

## 2023-06-02 DIAGNOSIS — N28.1 RENAL CYST, LEFT: ICD-10-CM

## 2023-06-02 DIAGNOSIS — N20.0 NEPHROLITHIASIS: Primary | ICD-10-CM

## 2023-06-02 NOTE — TELEPHONE ENCOUNTER
SURGERY [address] [place] [address] [address] Ruth Anderson, [address]      Phone *[phone] *[phone]   Surgical Scheduling Direct Line Phone *[phone] Fax *[phone]      Mitzy Rodriguez 1955 female    [address] [facility] [address]   Marital Status:          Home Phone: [phone]      Cell Phone:    Telephone Information:   Mobile [phone]          Surgeon: Dr. Yuli Ibarra             Surgery Date: 7/3/23   Time: 7:30 am    Procedure: Cystoscopy with Bilateral Retrograde Pyelogram,Cystoscopy, left ureteroscopy, laser lithotripsy, basket retrieval of stone fragments, and possible left ureteral stent placement     Diagnosis: Renal Cyst, Kidney Stone    Important Medical History:  In [facility]    Special Inst/Equip:     CPT Codes:    20480,08095  Latex Allergy: No     Cardiac Device:  No    Anesthesia:  General          Admission Type:  Same Day                        Admit Prior to Day of Surgery: No    Case Location:  Main OR            Preadmission Testing:  Phone Call          PAT Date and Time:______________________________________________________    PAT Confirmation #: ______________________________________________________    Post Op Visit: ___________________________________________________________    Need Preop Cardiac Clearance: Yes    Does Patient have Cardiologist/physician?      Dr Ceferino Perez Confirmation #: __________________________________________________    Joycelyn Benjamin: ________________________   Date: __________________________     [employer] Name: Medicare

## 2023-06-02 NOTE — TELEPHONE ENCOUNTER
DO NOT TAKE  FISH OIL, MOBIC, IBUPROFEN, MOTRIN-LIKE DRUGS AND ANY MULTIVITAMINS OR OVER THE COUNTER SUPPLEMENTS 14 DAYS PRIOR TO SURGERY. HOLD THE ASPIRIN 5 DAYS PRIOR TO THE SURGERY    MUST HAVE AN ADULT OVER THE AGE OF 18 WITH YOU AT THE TIME OF THE DISCHARGE AND WITH YOU AT HOME AFTER THE PROCEDURE FOR 24 HOURS          Ruben aDniel 1955 Diagnosis:     Surgical Physician: Dr. Caryl Castro have been scheduled for the procedure marked below:      Surgery: Cystoscopy with Bilateral Retrograde Pyelogram,Cystoscopy, left ureteroscopy, laser lithotripsy, basket retrieval of stone fragments, and possible left ureteral stent placement        Date: 7/3/23     Anesthesia: Anesthesiologist (General/Spinal)     Place of Service: [address] [place] [address] Second Floor Same Day Surgery         Arrive to same day surgery by:  6:00 am  (Surgery time is subject to change)      INSTRUCTIONS AS MARKED BELOW:    1.  DO NOT eat or drink anything after midnight before surgery. 2.  We prefer you shower or bathe with an antibacterial soap (Dial) the morning of surgery. 3  Please bring a current medication list, photo ID and insurance card(s) with you  4. Okay to take Tylenol  5. If you take Glucophage, Metformin or Janumet, hold 48-hours prior to surgery  6  Take blood pressure or heart medication as directed, if taken in the morning take with a small sip of water  7. The office will call you in 1-2 days after your procedure to schedule a follow up. DATE SENSITIVE TESTING-DO ON THE DATE LISTED *WALK IN *NO APPOINTMENT    1.) DO THE PRE OP URINE CULTURE AND FASTING LABS ON 6/22/23.  ORDERS INCLUDED        Date: 6/2/2023

## 2023-06-02 NOTE — TELEPHONE ENCOUNTER
Patient scheduled for a Cystoscopy with Bilateral Retrograde Pyelogram,Cystoscopy, left ureteroscopy, laser lithotripsy, basket retrieval of stone fragments, and possible left ureteral stent placement with Dr Melba Miles on 7/3/23.  We are asking for clearance

## 2023-06-22 ENCOUNTER — HOSPITAL ENCOUNTER (OUTPATIENT)
Age: 68
Discharge: HOME | End: 2023-06-22
Payer: MEDICARE

## 2023-06-22 DIAGNOSIS — N20.0 NEPHROLITHIASIS: ICD-10-CM

## 2023-06-22 DIAGNOSIS — N28.1 RENAL CYST, LEFT: ICD-10-CM

## 2023-06-22 DIAGNOSIS — R31.0 GROSS HEMATURIA: ICD-10-CM

## 2023-06-22 DIAGNOSIS — Z01.818 PRE-OP TESTING: ICD-10-CM

## 2023-06-22 LAB
ANION GAP SERPL CALC-SCNC: 12 MEQ/L (ref 8–16)
BASOPHILS ABSOLUTE: 0 THOU/MM3 (ref 0–0.1)
BASOPHILS NFR BLD AUTO: 0.5 %
BUN SERPL-MCNC: 21 MG/DL (ref 7–22)
CALCIUM SERPL-MCNC: 9.4 MG/DL (ref 8.5–10.5)
CHLORIDE SERPL-SCNC: 105 MEQ/L (ref 98–111)
CO2 SERPL-SCNC: 24 MEQ/L (ref 23–33)
CREAT SERPL-MCNC: 0.9 MG/DL (ref 0.4–1.2)
DEPRECATED RDW RBC AUTO: 49.4 FL (ref 35–45)
EOSINOPHIL NFR BLD AUTO: 2.5 %
EOSINOPHILS ABSOLUTE: 0.2 THOU/MM3 (ref 0–0.4)
ERYTHROCYTE [DISTWIDTH] IN BLOOD BY AUTOMATED COUNT: 13.8 % (ref 11.5–14.5)
GFR SERPL CREATININE-BSD FRML MDRD: > 60 ML/MIN/1.73M2
GLUCOSE SERPL-MCNC: 95 MG/DL (ref 70–108)
HCT VFR BLD AUTO: 40.1 % (ref 37–47)
HGB BLD-MCNC: 12.2 GM/DL (ref 12–16)
IMM GRANULOCYTES # BLD AUTO: 0.01 THOU/MM3 (ref 0–0.07)
IMM GRANULOCYTES NFR BLD AUTO: 0.2 %
LYMPHOCYTES ABSOLUTE: 1.2 THOU/MM3 (ref 1–4.8)
LYMPHOCYTES NFR BLD AUTO: 19.5 %
MCH RBC QN AUTO: 29.7 PG (ref 26–33)
MCHC RBC AUTO-ENTMCNC: 30.4 GM/DL (ref 32.2–35.5)
MCV RBC AUTO: 97.6 FL (ref 81–99)
MONOCYTES ABSOLUTE: 0.4 THOU/MM3 (ref 0.4–1.3)
MONOCYTES NFR BLD AUTO: 5.9 %
NEUTROPHILS NFR BLD AUTO: 71.4 %
NRBC BLD AUTO-RTO: 0 /100 WBC
PLATELET # BLD AUTO: 245 THOU/MM3 (ref 130–400)
PMV BLD AUTO: 10.7 FL (ref 9.4–12.4)
POTASSIUM SERPL-SCNC: 4.3 MEQ/L (ref 3.5–5.2)
RBC # BLD AUTO: 4.11 MILL/MM3 (ref 4.2–5.4)
SEGMENTED NEUTROPHILS ABSOLUTE COUNT: 4.4 THOU/MM3 (ref 1.8–7.7)
SODIUM SERPL-SCNC: 141 MEQ/L (ref 135–145)
WBC # BLD AUTO: 6.1 THOU/MM3 (ref 4.8–10.8)

## 2023-06-22 PROCEDURE — 85025 COMPLETE CBC W/AUTO DIFF WBC: CPT

## 2023-06-22 PROCEDURE — 87086 URINE CULTURE/COLONY COUNT: CPT

## 2023-06-22 PROCEDURE — 80048 BASIC METABOLIC PNL TOTAL CA: CPT

## 2023-06-22 PROCEDURE — 36415 COLL VENOUS BLD VENIPUNCTURE: CPT

## 2023-06-23 ENCOUNTER — OFFICE VISIT (OUTPATIENT)
Dept: CARDIOLOGY CLINIC | Age: 68
End: 2023-06-23
Payer: MEDICARE

## 2023-06-23 VITALS
SYSTOLIC BLOOD PRESSURE: 110 MMHG | DIASTOLIC BLOOD PRESSURE: 62 MMHG | BODY MASS INDEX: 26.99 KG/M2 | WEIGHT: 162 LBS | HEART RATE: 80 BPM | HEIGHT: 65 IN

## 2023-06-23 DIAGNOSIS — I20.8 ANGINAL EQUIVALENT (HCC): ICD-10-CM

## 2023-06-23 DIAGNOSIS — Z01.810 PREOP CARDIOVASCULAR EXAM: Primary | ICD-10-CM

## 2023-06-23 PROBLEM — I20.89 ANGINAL EQUIVALENT (HCC): Status: ACTIVE | Noted: 2023-06-23

## 2023-06-23 LAB
BACTERIA UR CULT: ABNORMAL
ORGANISM: ABNORMAL

## 2023-06-23 PROCEDURE — 3017F COLORECTAL CA SCREEN DOC REV: CPT | Performed by: INTERNAL MEDICINE

## 2023-06-23 PROCEDURE — G8399 PT W/DXA RESULTS DOCUMENT: HCPCS | Performed by: INTERNAL MEDICINE

## 2023-06-23 PROCEDURE — 99213 OFFICE O/P EST LOW 20 MIN: CPT | Performed by: INTERNAL MEDICINE

## 2023-06-23 PROCEDURE — 1036F TOBACCO NON-USER: CPT | Performed by: INTERNAL MEDICINE

## 2023-06-23 PROCEDURE — 1090F PRES/ABSN URINE INCON ASSESS: CPT | Performed by: INTERNAL MEDICINE

## 2023-06-23 PROCEDURE — G8417 CALC BMI ABV UP PARAM F/U: HCPCS | Performed by: INTERNAL MEDICINE

## 2023-06-23 PROCEDURE — G8427 DOCREV CUR MEDS BY ELIG CLIN: HCPCS | Performed by: INTERNAL MEDICINE

## 2023-06-23 PROCEDURE — 1123F ACP DISCUSS/DSCN MKR DOCD: CPT | Performed by: INTERNAL MEDICINE

## 2023-06-23 NOTE — PROGRESS NOTES
Pt here for 9 mo check up needs clearance for urolift delma 7/3/23 with Dr. Jenni Crocker    Pt continues with swelling at times

## 2023-06-23 NOTE — PROGRESS NOTES
[address] [facility] [place] [address]  [place] [address]  Dept: [phone]  Dept Fax: [phone]  Loc: [phone]    Visit Date: 6/23/2023    Ms. Janice Maldonado is a 74 y.o. female  who presented for:  No chief complaint on file. HPI:   73 yo F c hx of hypothyrodism, SANTANA (Hb 9.8/34) is here for preop for urology procedure for nephrolithiasis. Patient has been following up with hematologist/gastroenterologist and has been getting blood transfusion and iron infusion. EKG shows SR, NSST. Echo recently showed EF 60%, no WMA. Recently had stress test which showed no ischemia. Denies chest pains, or shortness of breath. Current Outpatient Medications:     cefdinir (OMNICEF) 300 MG capsule, Take 1 capsule by mouth 2 times daily for 10 days, Disp: 20 capsule, Rfl: 0    UNABLE TO FIND, 500 mg Valacyclovir (for blood blisters), Disp: , Rfl:     citalopram (CELEXA) 40 MG tablet, Take 1 tablet by mouth daily, Disp: 90 tablet, Rfl: 3    levothyroxine (SYNTHROID) 50 MCG tablet, take 1 tablet by mouth once daily, Disp: 90 tablet, Rfl: 3    valsartan-hydroCHLOROthiazide (DIOVAN-HCT) 160-12.5 MG per tablet, Take 1 tablet by mouth daily, Disp: 90 tablet, Rfl: 3    Past Medical History  Yessy Meneses  has a past medical history of Arthritis of left knee, Hiatal hernia, History of blood transfusion, History of kidney stones, Hypertension, Hypothyroidism, and Renal cyst.    Social History  Yessy Meneses  reports that she has never smoked. She has never used smokeless tobacco. She reports current alcohol use. She reports that she does not use drugs. Family History  Sofya family history includes Arthritis in her mother; Birth Defects in her brother and father; Breast Cancer (age of onset: 64) in her maternal cousin; Breast Cancer (age of onset: 80) in her mother; Diabetes in her maternal aunt; High Blood Pressure in her mother.     Past Surgical History   Past

## 2023-06-26 ENCOUNTER — TELEPHONE (OUTPATIENT)
Dept: UROLOGY | Age: 68
End: 2023-06-26

## 2023-06-26 RX ORDER — CEPHALEXIN 500 MG/1
500 CAPSULE ORAL 3 TIMES DAILY
Qty: 30 CAPSULE | Refills: 0 | Status: SHIPPED | OUTPATIENT
Start: 2023-06-26 | End: 2023-06-28

## 2023-06-27 ENCOUNTER — PREP FOR PROCEDURE (OUTPATIENT)
Dept: UROLOGY | Age: 68
End: 2023-06-27

## 2023-06-28 RX ORDER — CEFDINIR 300 MG/1
300 CAPSULE ORAL 2 TIMES DAILY
Status: ON HOLD | COMMUNITY
End: 2023-07-03 | Stop reason: ALTCHOICE

## 2023-06-28 RX ORDER — CEPHALEXIN 500 MG/1
500 CAPSULE ORAL 3 TIMES DAILY
Qty: 30 CAPSULE | Refills: 0 | Status: SHIPPED | OUTPATIENT
Start: 2023-06-28 | End: 2023-07-08

## 2023-06-29 RX ORDER — SODIUM CHLORIDE 0.9 % (FLUSH) 0.9 %
5-40 SYRINGE (ML) INJECTION PRN
Status: CANCELLED | OUTPATIENT
Start: 2023-06-29

## 2023-06-29 RX ORDER — SODIUM CHLORIDE 0.9 % (FLUSH) 0.9 %
5-40 SYRINGE (ML) INJECTION EVERY 12 HOURS SCHEDULED
Status: CANCELLED | OUTPATIENT
Start: 2023-06-29

## 2023-06-29 RX ORDER — SODIUM CHLORIDE 9 MG/ML
INJECTION, SOLUTION INTRAVENOUS PRN
Status: CANCELLED | OUTPATIENT
Start: 2023-06-29

## 2023-07-03 ENCOUNTER — ANESTHESIA (OUTPATIENT)
Dept: OPERATING ROOM | Age: 68
End: 2023-07-03
Payer: MEDICARE

## 2023-07-03 ENCOUNTER — HOSPITAL ENCOUNTER (OUTPATIENT)
Age: 68
Setting detail: OUTPATIENT SURGERY
Discharge: HOME | End: 2023-07-03
Attending: UROLOGY | Admitting: UROLOGY
Payer: MEDICARE

## 2023-07-03 ENCOUNTER — ANESTHESIA EVENT (OUTPATIENT)
Dept: OPERATING ROOM | Age: 68
End: 2023-07-03
Payer: MEDICARE

## 2023-07-03 VITALS
HEIGHT: 67 IN | BODY MASS INDEX: 25.74 KG/M2 | OXYGEN SATURATION: 100 % | SYSTOLIC BLOOD PRESSURE: 120 MMHG | WEIGHT: 164 LBS | RESPIRATION RATE: 16 BRPM | HEART RATE: 61 BPM | TEMPERATURE: 96.9 F | DIASTOLIC BLOOD PRESSURE: 66 MMHG

## 2023-07-03 DIAGNOSIS — G89.18 POST-OP PAIN: Primary | ICD-10-CM

## 2023-07-03 PROCEDURE — 2580000003 HC RX 258: Performed by: UROLOGY

## 2023-07-03 PROCEDURE — 3600000003 HC SURGERY LEVEL 3 BASE: Performed by: UROLOGY

## 2023-07-03 PROCEDURE — C1769 GUIDE WIRE: HCPCS | Performed by: UROLOGY

## 2023-07-03 PROCEDURE — 2580000003 HC RX 258: Performed by: ANESTHESIOLOGY

## 2023-07-03 PROCEDURE — 2709999900 HC NON-CHARGEABLE SUPPLY: Performed by: UROLOGY

## 2023-07-03 PROCEDURE — 51798 US URINE CAPACITY MEASURE: CPT

## 2023-07-03 PROCEDURE — C1747 HC ENDOSCOPE, SINGLE, URINARY TRACT: HCPCS | Performed by: UROLOGY

## 2023-07-03 PROCEDURE — 6360000004 HC RX CONTRAST MEDICATION: Performed by: UROLOGY

## 2023-07-03 PROCEDURE — 3600000013 HC SURGERY LEVEL 3 ADDTL 15MIN: Performed by: UROLOGY

## 2023-07-03 PROCEDURE — C2617 STENT, NON-COR, TEM W/O DEL: HCPCS | Performed by: UROLOGY

## 2023-07-03 PROCEDURE — 3700000001 HC ADD 15 MINUTES (ANESTHESIA): Performed by: UROLOGY

## 2023-07-03 PROCEDURE — 2500000003 HC RX 250 WO HCPCS

## 2023-07-03 PROCEDURE — 7100000000 HC PACU RECOVERY - FIRST 15 MIN: Performed by: UROLOGY

## 2023-07-03 PROCEDURE — 7100000011 HC PHASE II RECOVERY - ADDTL 15 MIN: Performed by: UROLOGY

## 2023-07-03 PROCEDURE — C1758 CATHETER, URETERAL: HCPCS | Performed by: UROLOGY

## 2023-07-03 PROCEDURE — 2720000010 HC SURG SUPPLY STERILE: Performed by: UROLOGY

## 2023-07-03 PROCEDURE — 6360000002 HC RX W HCPCS: Performed by: UROLOGY

## 2023-07-03 PROCEDURE — 3700000000 HC ANESTHESIA ATTENDED CARE: Performed by: UROLOGY

## 2023-07-03 PROCEDURE — 7100000010 HC PHASE II RECOVERY - FIRST 15 MIN: Performed by: UROLOGY

## 2023-07-03 PROCEDURE — 6360000002 HC RX W HCPCS

## 2023-07-03 PROCEDURE — 7100000001 HC PACU RECOVERY - ADDTL 15 MIN: Performed by: UROLOGY

## 2023-07-03 DEVICE — URETERAL STENT
Type: IMPLANTABLE DEVICE | Site: URETER | Status: FUNCTIONAL
Brand: PERCUFLEX™ PLUS

## 2023-07-03 RX ORDER — METOCLOPRAMIDE HYDROCHLORIDE 5 MG/ML
10 INJECTION INTRAMUSCULAR; INTRAVENOUS
Status: DISCONTINUED | OUTPATIENT
Start: 2023-07-03 | End: 2023-07-03 | Stop reason: HOSPADM

## 2023-07-03 RX ORDER — SODIUM CHLORIDE 9 MG/ML
INJECTION, SOLUTION INTRAVENOUS PRN
Status: DISCONTINUED | OUTPATIENT
Start: 2023-07-03 | End: 2023-07-03 | Stop reason: HOSPADM

## 2023-07-03 RX ORDER — FENTANYL CITRATE 50 UG/ML
25 INJECTION, SOLUTION INTRAMUSCULAR; INTRAVENOUS EVERY 5 MIN PRN
Status: DISCONTINUED | OUTPATIENT
Start: 2023-07-03 | End: 2023-07-03 | Stop reason: HOSPADM

## 2023-07-03 RX ORDER — DIPHENHYDRAMINE HYDROCHLORIDE 50 MG/ML
12.5 INJECTION INTRAMUSCULAR; INTRAVENOUS
Status: DISCONTINUED | OUTPATIENT
Start: 2023-07-03 | End: 2023-07-03 | Stop reason: HOSPADM

## 2023-07-03 RX ORDER — LIDOCAINE HCL/PF 100 MG/5ML
SYRINGE (ML) INJECTION PRN
Status: DISCONTINUED | OUTPATIENT
Start: 2023-07-03 | End: 2023-07-03 | Stop reason: SDUPTHER

## 2023-07-03 RX ORDER — PROPOFOL 10 MG/ML
INJECTION, EMULSION INTRAVENOUS PRN
Status: DISCONTINUED | OUTPATIENT
Start: 2023-07-03 | End: 2023-07-03 | Stop reason: SDUPTHER

## 2023-07-03 RX ORDER — DEXAMETHASONE SODIUM PHOSPHATE 10 MG/ML
INJECTION, EMULSION INTRAMUSCULAR; INTRAVENOUS PRN
Status: DISCONTINUED | OUTPATIENT
Start: 2023-07-03 | End: 2023-07-03 | Stop reason: SDUPTHER

## 2023-07-03 RX ORDER — SODIUM CHLORIDE 0.9 % (FLUSH) 0.9 %
5-40 SYRINGE (ML) INJECTION PRN
Status: DISCONTINUED | OUTPATIENT
Start: 2023-07-03 | End: 2023-07-03 | Stop reason: HOSPADM

## 2023-07-03 RX ORDER — OXYCODONE HYDROCHLORIDE AND ACETAMINOPHEN 5; 325 MG/1; MG/1
1 TABLET ORAL ONCE
Status: DISCONTINUED | OUTPATIENT
Start: 2023-07-03 | End: 2023-07-03 | Stop reason: HOSPADM

## 2023-07-03 RX ORDER — PHENAZOPYRIDINE HYDROCHLORIDE 200 MG/1
200 TABLET, FILM COATED ORAL ONCE
Status: DISCONTINUED | OUTPATIENT
Start: 2023-07-03 | End: 2023-07-03 | Stop reason: HOSPADM

## 2023-07-03 RX ORDER — GLYCOPYRROLATE 0.2 MG/ML
INJECTION INTRAMUSCULAR; INTRAVENOUS PRN
Status: DISCONTINUED | OUTPATIENT
Start: 2023-07-03 | End: 2023-07-03 | Stop reason: SDUPTHER

## 2023-07-03 RX ORDER — FENTANYL CITRATE 50 UG/ML
INJECTION, SOLUTION INTRAMUSCULAR; INTRAVENOUS PRN
Status: DISCONTINUED | OUTPATIENT
Start: 2023-07-03 | End: 2023-07-03 | Stop reason: SDUPTHER

## 2023-07-03 RX ORDER — OXYCODONE HYDROCHLORIDE AND ACETAMINOPHEN 5; 325 MG/1; MG/1
1 TABLET ORAL EVERY 4 HOURS PRN
Qty: 20 TABLET | Refills: 0 | Status: SHIPPED | OUTPATIENT
Start: 2023-07-03 | End: 2023-07-10

## 2023-07-03 RX ORDER — FENTANYL CITRATE 50 UG/ML
50 INJECTION, SOLUTION INTRAMUSCULAR; INTRAVENOUS EVERY 5 MIN PRN
Status: DISCONTINUED | OUTPATIENT
Start: 2023-07-03 | End: 2023-07-03 | Stop reason: HOSPADM

## 2023-07-03 RX ORDER — SODIUM CHLORIDE 0.9 % (FLUSH) 0.9 %
5-40 SYRINGE (ML) INJECTION EVERY 12 HOURS SCHEDULED
Status: DISCONTINUED | OUTPATIENT
Start: 2023-07-03 | End: 2023-07-03 | Stop reason: HOSPADM

## 2023-07-03 RX ORDER — CIPROFLOXACIN 500 MG/1
500 TABLET, FILM COATED ORAL 2 TIMES DAILY
Qty: 6 TABLET | Refills: 0 | Status: SHIPPED | OUTPATIENT
Start: 2023-07-03 | End: 2023-07-06

## 2023-07-03 RX ORDER — PHENAZOPYRIDINE HYDROCHLORIDE 200 MG/1
200 TABLET, FILM COATED ORAL 3 TIMES DAILY PRN
Qty: 9 TABLET | Refills: 0 | Status: SHIPPED | OUTPATIENT
Start: 2023-07-03

## 2023-07-03 RX ORDER — ONDANSETRON 2 MG/ML
INJECTION INTRAMUSCULAR; INTRAVENOUS PRN
Status: DISCONTINUED | OUTPATIENT
Start: 2023-07-03 | End: 2023-07-03 | Stop reason: SDUPTHER

## 2023-07-03 RX ORDER — KETOROLAC TROMETHAMINE 30 MG/ML
INJECTION, SOLUTION INTRAMUSCULAR; INTRAVENOUS PRN
Status: DISCONTINUED | OUTPATIENT
Start: 2023-07-03 | End: 2023-07-03 | Stop reason: SDUPTHER

## 2023-07-03 RX ORDER — MEPERIDINE HYDROCHLORIDE 25 MG/ML
12.5 INJECTION INTRAMUSCULAR; INTRAVENOUS; SUBCUTANEOUS EVERY 4 HOURS PRN
Status: DISCONTINUED | OUTPATIENT
Start: 2023-07-03 | End: 2023-07-03 | Stop reason: HOSPADM

## 2023-07-03 RX ADMIN — ONDANSETRON 4 MG: 2 INJECTION INTRAMUSCULAR; INTRAVENOUS at 08:43

## 2023-07-03 RX ADMIN — GLYCOPYRROLATE 0.4 MG: 0.2 INJECTION INTRAMUSCULAR; INTRAVENOUS at 08:46

## 2023-07-03 RX ADMIN — SODIUM CHLORIDE: 9 INJECTION, SOLUTION INTRAVENOUS at 10:23

## 2023-07-03 RX ADMIN — Medication 100 MG: at 08:28

## 2023-07-03 RX ADMIN — SODIUM CHLORIDE: 9 INJECTION, SOLUTION INTRAVENOUS at 06:26

## 2023-07-03 RX ADMIN — FENTANYL CITRATE 50 MCG: 50 INJECTION, SOLUTION INTRAMUSCULAR; INTRAVENOUS at 08:28

## 2023-07-03 RX ADMIN — PROPOFOL 150 MG: 10 INJECTION, EMULSION INTRAVENOUS at 08:28

## 2023-07-03 RX ADMIN — KETOROLAC TROMETHAMINE 15 MG: 30 INJECTION, SOLUTION INTRAMUSCULAR; INTRAVENOUS at 09:03

## 2023-07-03 RX ADMIN — Medication 2000 MG: at 08:30

## 2023-07-03 RX ADMIN — FENTANYL CITRATE 50 MCG: 50 INJECTION, SOLUTION INTRAMUSCULAR; INTRAVENOUS at 08:43

## 2023-07-03 RX ADMIN — DEXAMETHASONE SODIUM PHOSPHATE 10 MG: 10 INJECTION, EMULSION INTRAMUSCULAR; INTRAVENOUS at 08:32

## 2023-07-03 ASSESSMENT — PAIN - FUNCTIONAL ASSESSMENT: PAIN_FUNCTIONAL_ASSESSMENT: 0-10

## 2023-07-03 ASSESSMENT — PAIN SCALES - GENERAL: PAINLEVEL_OUTOF10: 0

## 2023-07-03 NOTE — H&P
History and Physical    Patient:  Phi Torres  MRN: [id number]  YOB: 1955    CHIEF COMPLAINT:  left kidney stone; hematuria    HISTORY OF PRESENT ILLNESS:   The patient is a 74 y.o. female who presents with as above, here for surgery    Patient's old records, notes and chart reviewed and summarized above. Past Medical History:    Past Medical History:   Diagnosis Date    Arthritis of left knee 02/08/2022    Hiatal hernia     History of blood transfusion 08/2022    low Iron    History of kidney stones     Hypertension     Hypothyroidism 09/09/2013    Renal cyst 02/28/2022       Past Surgical History:    Past Surgical History:   Procedure Laterality Date    ANKLE ARTHROSCOPY Left 05/15/2017    COLONOSCOPY  08/23/2022    CYSTOSCOPY Bilateral 09/27/2022    Cystoscopy Bilateral Retrograde Pyelogram and pelvic exam performed by Jackie Douglas MD at [address] [place] [address], [facility], DIAGNOSTIC  08/09/2022    FOOT SURGERY Right 04/2022    HERNIA REPAIR      HYSTERECTOMY (CERVIX STATUS UNKNOWN)  2009    KNEE ARTHROSCOPY Left 07/20/2021    THYROID SURGERY  01/05/2011    TONSILLECTOMY       Medications Prior to Admission:    Prior to Admission medications    Medication Sig Start Date End Date Taking?  Authorizing Provider   cephALEXin (KEFLEX) 500 MG capsule Take 1 capsule by mouth 3 times daily for 10 days 6/28/23 7/8/23  JANEY Saavedra CNP   UNABLE TO FIND 500 mg daily Valacyclovir (for fever blisters)    Historical Provider, MD   citalopram (CELEXA) 40 MG tablet Take 1 tablet by mouth daily 3/6/23   JANEY Silva CNP   levothyroxine (SYNTHROID) 50 MCG tablet take 1 tablet by mouth once daily 3/6/23   JANEY Silva CNP   valsartan-hydroCHLOROthiazide (DIOVAN-HCT) 160-12.5 MG per tablet Take 1 tablet by mouth daily 3/6/23   JANEY Silva CNP       Allergies:  Bee venom, Tetracyclines & related, Zithromax [azithromycin dihydrate], Bactrim

## 2023-07-03 NOTE — PROGRESS NOTES
Pt has met discharge criteria and states she is ready for discharge to home. IV removed, gauze and tape applied. Dressed in own clothes and personal belongings gathered. Discharge instructions (with opioid medication education information) given to pt and family; pt and family verbalized understanding of discharge instructions, prescriptions and follow up appointments. Pt transported to discharge lobby by Netta Dominique Principal [facility] staff.

## 2023-07-03 NOTE — PROGRESS NOTES
Dr. Krystyna Francois states patient can discharge home without voiding due to bladder scan only showing 233ml. Patient and family made aware, voice agreement. Patient and family educated on importance of returning to ER if unable to void at home in the next 8 hours. Patient and family voice good understanding.

## 2023-07-03 NOTE — PROGRESS NOTES
Dr. Tom Varner states patient may take the remainder of her keflex from home instead of the ordered cipro. Patient and family voice good understanding.

## 2023-07-03 NOTE — PROGRESS NOTES
Pt has met discharge criteria and states she is ready for discharge to home. IV removed, gauze and tape applied. Dressed in own clothes and personal belongings gathered. Discharge instructions (with opioid medication education information) given to pt and family; pt and family verbalized understanding of discharge instructions, prescriptions and follow up appointments. Pt transported to discharge lobby by Netta Dominique Principal [facility] staff.

## 2023-07-03 NOTE — OP NOTE
Operative Note      Patient: Yeimi Faulkner  YOB: 1955  MRN: [id number]    Date of Procedure: 7/3/2023    Pre-Op Diagnosis Codes:     * Renal cyst [N28.1]     * Kidney stone [N20.0]  Hematuria    Post-Op Diagnosis: Same       Procedure(s):  Cystoscopy with Bilateral Retrograde Pyelogram, Left Ureteroscopy, Laser Lithotripsy, Left Ureteral Stent Placement and pelvic Exam under anesthesia; left ureteral dilation    Surgeon(s):  Mayra Cooper MD    Assistant:   * No surgical staff found *    Anesthesia: General    Estimated Blood Loss (mL): Minimal    Complications: None    Specimens:   * No specimens in log *    Implants:  Implant Name Type Inv. Item Serial No.  Lot No. LRB No. Used Action   [employer] 6FR L26CM HYDR+ PGTL Jose Peer LO - [id number]  [employer] 6FR L26CM HYDR+ PGTL TAPR TIP GRAD BLDR MRK LO  [employer] [facility]- [id number] Left 1 Implanted         Drains: * No LDAs found *    Findings: left lower pole kidney stone 4-5 mm; moderate cystocele; normal pyelogram bilaterally, prominent vasculaturity of bladder mucosa, but no tumors noted. Detailed Description of Procedure: The risks and benefits of the procedure, as well as possible alternatives and complications were discussed and he consented. DETAILS OF THE PROCEDURE:  The patient was correctly identified in the preoperative holding area. The patient  was brought back to the operating room and placed in the dorsal lithotomy  position. Anesthesia was administered; antibiotics administered by Anesthesia. EPC cuffs  were on and functional. The patient was then prepped and draped in the usual sterile fashion. Once an appropriate time out had been performed, with all parties  consenting, a 25 [place] cystoscope with a 30-degree lens was placed through  the urethra into the bladder. The right ureteral orifice was cannulated with a 5 [place] ureteral catheter.  Contrast was injected and the right

## 2023-07-03 NOTE — PROGRESS NOTES
1446 - pt arrives to pacu, respirations unlabored on room air, pt denies pain, VSS    0920 - pt denies pain at this time    0930 - pt using bedpan at this time    0937 - pt meets criteria for discharge from pacu at this time, pt transported to [facility] in stable condition NSICU

## 2023-08-23 ENCOUNTER — OFFICE VISIT (OUTPATIENT)
Dept: UROLOGY | Age: 68
End: 2023-08-23
Payer: MEDICARE

## 2023-08-23 VITALS
BODY MASS INDEX: 25.74 KG/M2 | WEIGHT: 164 LBS | HEIGHT: 67 IN | DIASTOLIC BLOOD PRESSURE: 68 MMHG | SYSTOLIC BLOOD PRESSURE: 120 MMHG

## 2023-08-23 DIAGNOSIS — N20.0 NEPHROLITHIASIS: Primary | ICD-10-CM

## 2023-08-23 DIAGNOSIS — D17.71 ANGIOMYOLIPOMA OF RIGHT KIDNEY: ICD-10-CM

## 2023-08-23 DIAGNOSIS — N28.1 RENAL CYST, LEFT: ICD-10-CM

## 2023-08-23 LAB
BILIRUBIN URINE: NEGATIVE
BLOOD URINE, POC: NEGATIVE
CHARACTER, URINE: CLEAR
COLOR, URINE: YELLOW
GLUCOSE URINE: NEGATIVE MG/DL
KETONES, URINE: NEGATIVE
LEUKOCYTE CLUMPS, URINE: NEGATIVE
NITRITE, URINE: NEGATIVE
PH, URINE: 6 (ref 5–9)
PROTEIN, URINE: NEGATIVE MG/DL
SPECIFIC GRAVITY, URINE: 1.02 (ref 1–1.03)
UROBILINOGEN, URINE: 0.2 EU/DL (ref 0–1)

## 2023-08-23 PROCEDURE — 81003 URINALYSIS AUTO W/O SCOPE: CPT

## 2023-08-23 PROCEDURE — 99214 OFFICE O/P EST MOD 30 MIN: CPT

## 2023-08-23 PROCEDURE — 3017F COLORECTAL CA SCREEN DOC REV: CPT

## 2023-08-23 PROCEDURE — G8427 DOCREV CUR MEDS BY ELIG CLIN: HCPCS

## 2023-08-23 PROCEDURE — G8417 CALC BMI ABV UP PARAM F/U: HCPCS

## 2023-08-23 PROCEDURE — 1036F TOBACCO NON-USER: CPT

## 2023-08-23 PROCEDURE — G8399 PT W/DXA RESULTS DOCUMENT: HCPCS

## 2023-08-23 PROCEDURE — 1123F ACP DISCUSS/DSCN MKR DOCD: CPT

## 2023-08-23 PROCEDURE — 1090F PRES/ABSN URINE INCON ASSESS: CPT

## 2023-08-23 NOTE — PROGRESS NOTES
Value Date/Time    BUN 21 06/22/2023 08:53 AM    CREATININE 0.9 06/22/2023 08:53 AM            Assessment & Plan:   Left Nephrolithiasis  - Resolved via surgery. No flank pain. No hematuria. Patient doing well post-op. - Encouraged to continue with hydration goal of >80 oz water daily. Patient has done a nice job of cutting down on soda intake. 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 Never started her potassium citrate 10 mEq TID. Would prefer to hold off on this. - KUB for kidney stone surveillance. 2.  Right Angiomyolipoma  3. Right Renal Cyst  - Per Dr. Maximino Bailey, no imminent surgical plans- surveillance. - Renal US Complete in 1 year. *Follow-up in 1 year with KUB and Renal US prior.     Leatha Monique PA-C  Urology

## 2023-09-12 ENCOUNTER — HOSPITAL ENCOUNTER (OUTPATIENT)
Age: 68
Discharge: HOME | End: 2023-09-12
Payer: MEDICARE

## 2023-09-12 LAB
ALBUMIN SERPL BCG-MCNC: 4.2 G/DL (ref 3.5–5.1)
ALP SERPL-CCNC: 76 U/L (ref 38–126)
ALT SERPL W/O P-5'-P-CCNC: 9 U/L (ref 11–66)
ANION GAP SERPL CALC-SCNC: 6 MEQ/L (ref 8–16)
AST SERPL-CCNC: 11 U/L (ref 5–40)
BASOPHILS ABSOLUTE: 0 THOU/MM3 (ref 0–0.1)
BASOPHILS NFR BLD AUTO: 0.6 %
BILIRUB SERPL-MCNC: 0.3 MG/DL (ref 0.3–1.2)
BUN SERPL-MCNC: 22 MG/DL (ref 7–22)
CALCIUM SERPL-MCNC: 9.7 MG/DL (ref 8.5–10.5)
CHLORIDE SERPL-SCNC: 106 MEQ/L (ref 98–111)
CO2 SERPL-SCNC: 28 MEQ/L (ref 23–33)
CREAT SERPL-MCNC: 0.8 MG/DL (ref 0.4–1.2)
DEPRECATED RDW RBC AUTO: 46.7 FL (ref 35–45)
EOSINOPHIL NFR BLD AUTO: 2.5 %
EOSINOPHILS ABSOLUTE: 0.1 THOU/MM3 (ref 0–0.4)
ERYTHROCYTE [DISTWIDTH] IN BLOOD BY AUTOMATED COUNT: 13.1 % (ref 11.5–14.5)
GFR SERPL CREATININE-BSD FRML MDRD: > 60 ML/MIN/1.73M2
GLUCOSE SERPL-MCNC: 99 MG/DL (ref 70–108)
HCT VFR BLD AUTO: 40.3 % (ref 37–47)
HGB BLD-MCNC: 12.4 GM/DL (ref 12–16)
IMM GRANULOCYTES # BLD AUTO: 0.01 THOU/MM3 (ref 0–0.07)
IMM GRANULOCYTES NFR BLD AUTO: 0.2 %
LYMPHOCYTES ABSOLUTE: 1.2 THOU/MM3 (ref 1–4.8)
LYMPHOCYTES NFR BLD AUTO: 24.9 %
MCH RBC QN AUTO: 30.2 PG (ref 26–33)
MCHC RBC AUTO-ENTMCNC: 30.8 GM/DL (ref 32.2–35.5)
MCV RBC AUTO: 98.1 FL (ref 81–99)
MONOCYTES ABSOLUTE: 0.3 THOU/MM3 (ref 0.4–1.3)
MONOCYTES NFR BLD AUTO: 6.5 %
NEUTROPHILS NFR BLD AUTO: 65.3 %
NRBC BLD AUTO-RTO: 0 /100 WBC
PLATELET # BLD AUTO: 206 THOU/MM3 (ref 130–400)
PMV BLD AUTO: 11 FL (ref 9.4–12.4)
POTASSIUM SERPL-SCNC: 5.4 MEQ/L (ref 3.5–5.2)
PROT SERPL-MCNC: 6.4 G/DL (ref 6.1–8)
RBC # BLD AUTO: 4.11 MILL/MM3 (ref 4.2–5.4)
SEGMENTED NEUTROPHILS ABSOLUTE COUNT: 3.1 THOU/MM3 (ref 1.8–7.7)
SODIUM SERPL-SCNC: 140 MEQ/L (ref 135–145)
WBC # BLD AUTO: 4.8 THOU/MM3 (ref 4.8–10.8)

## 2023-09-12 PROCEDURE — 80053 COMPREHEN METABOLIC PANEL: CPT

## 2023-09-12 PROCEDURE — 85025 COMPLETE CBC W/AUTO DIFF WBC: CPT

## 2023-09-12 PROCEDURE — 36415 COLL VENOUS BLD VENIPUNCTURE: CPT

## 2023-09-15 ENCOUNTER — HOSPITAL ENCOUNTER (OUTPATIENT)
Age: 68
Discharge: HOME | End: 2023-09-15
Payer: MEDICARE

## 2023-09-15 LAB
ALBUMIN SERPL BCG-MCNC: 4.2 G/DL (ref 3.5–5.1)
ALP SERPL-CCNC: 76 U/L (ref 38–126)
ALT SERPL W/O P-5'-P-CCNC: 8 U/L (ref 11–66)
ANION GAP SERPL CALC-SCNC: 11 MEQ/L (ref 8–16)
AST SERPL-CCNC: 13 U/L (ref 5–40)
BASOPHILS ABSOLUTE: 0 THOU/MM3 (ref 0–0.1)
BASOPHILS NFR BLD AUTO: 0.6 %
BILIRUB SERPL-MCNC: 0.5 MG/DL (ref 0.3–1.2)
BUN SERPL-MCNC: 23 MG/DL (ref 7–22)
CALCIUM SERPL-MCNC: 9.7 MG/DL (ref 8.5–10.5)
CHLORIDE SERPL-SCNC: 107 MEQ/L (ref 98–111)
CO2 SERPL-SCNC: 26 MEQ/L (ref 23–33)
CREAT SERPL-MCNC: 0.7 MG/DL (ref 0.4–1.2)
DEPRECATED RDW RBC AUTO: 47.1 FL (ref 35–45)
EKG ATRIAL RATE: 51 BPM
EKG P AXIS: 43 DEGREES
EKG P-R INTERVAL: 136 MS
EKG Q-T INTERVAL: 456 MS
EKG QRS DURATION: 80 MS
EKG QTC CALCULATION (BAZETT): 420 MS
EKG R AXIS: 21 DEGREES
EKG T AXIS: 81 DEGREES
EKG VENTRICULAR RATE: 51 BPM
EOSINOPHIL NFR BLD AUTO: 2.2 %
EOSINOPHILS ABSOLUTE: 0.1 THOU/MM3 (ref 0–0.4)
ERYTHROCYTE [DISTWIDTH] IN BLOOD BY AUTOMATED COUNT: 13.1 % (ref 11.5–14.5)
GFR SERPL CREATININE-BSD FRML MDRD: > 60 ML/MIN/1.73M2
GLUCOSE SERPL-MCNC: 97 MG/DL (ref 70–108)
HCT VFR BLD AUTO: 39.1 % (ref 37–47)
HGB BLD-MCNC: 12 GM/DL (ref 12–16)
IMM GRANULOCYTES # BLD AUTO: 0.01 THOU/MM3 (ref 0–0.07)
IMM GRANULOCYTES NFR BLD AUTO: 0.2 %
LYMPHOCYTES ABSOLUTE: 1.2 THOU/MM3 (ref 1–4.8)
LYMPHOCYTES NFR BLD AUTO: 22.8 %
MCH RBC QN AUTO: 30.3 PG (ref 26–33)
MCHC RBC AUTO-ENTMCNC: 30.7 GM/DL (ref 32.2–35.5)
MCV RBC AUTO: 98.7 FL (ref 81–99)
MONOCYTES ABSOLUTE: 0.4 THOU/MM3 (ref 0.4–1.3)
MONOCYTES NFR BLD AUTO: 7.3 %
NEUTROPHILS NFR BLD AUTO: 66.9 %
NRBC BLD AUTO-RTO: 0 /100 WBC
PLATELET # BLD AUTO: 198 THOU/MM3 (ref 130–400)
PMV BLD AUTO: 10.5 FL (ref 9.4–12.4)
POTASSIUM SERPL-SCNC: 4.9 MEQ/L (ref 3.5–5.2)
PROT SERPL-MCNC: 6.4 G/DL (ref 6.1–8)
RBC # BLD AUTO: 3.96 MILL/MM3 (ref 4.2–5.4)
SEGMENTED NEUTROPHILS ABSOLUTE COUNT: 3.4 THOU/MM3 (ref 1.8–7.7)
SODIUM SERPL-SCNC: 144 MEQ/L (ref 135–145)
WBC # BLD AUTO: 5.1 THOU/MM3 (ref 4.8–10.8)

## 2023-09-15 PROCEDURE — 93005 ELECTROCARDIOGRAM TRACING: CPT | Performed by: SURGERY

## 2023-09-15 PROCEDURE — 36415 COLL VENOUS BLD VENIPUNCTURE: CPT

## 2023-09-15 PROCEDURE — 93010 ELECTROCARDIOGRAM REPORT: CPT | Performed by: INTERNAL MEDICINE

## 2023-09-15 PROCEDURE — 85025 COMPLETE CBC W/AUTO DIFF WBC: CPT

## 2023-09-15 PROCEDURE — 80053 COMPREHEN METABOLIC PANEL: CPT

## 2023-09-20 LAB
EKG ATRIAL RATE: 51 BPM
EKG P AXIS: 43 DEGREES
EKG P-R INTERVAL: 136 MS
EKG Q-T INTERVAL: 456 MS
EKG QRS DURATION: 80 MS
EKG QTC CALCULATION (BAZETT): 420 MS
EKG R AXIS: 21 DEGREES
EKG T AXIS: 81 DEGREES
EKG VENTRICULAR RATE: 51 BPM

## 2023-10-02 ENCOUNTER — TELEPHONE (OUTPATIENT)
Dept: UROLOGY | Age: 68
End: 2023-10-02

## 2023-10-02 NOTE — TELEPHONE ENCOUNTER
Patient scheduled for US RENAL COMP  at [facility] on 8/21/2024. Arrival of 1045AM  for a 11AM scan time. NO CARBONATED BEVERAGES; ARRIVE WELL HYDRATED WITH A FULL BLADDER.  Order mailed with instructions  to the patient

## 2023-10-31 NOTE — PROGRESS NOTES
[facility]    Patient: Ramon Quintero, 79 y.o.   : 1955    Patient of mine     Subjective     Chief Complaint   Patient presents with    Follow-up     1 yr w pft         HPI  Devon Moran is here for follow up for Moderate COPD. Patient had a fall after last appointment and is having her leg/knee looked at today. She admits that she hit her head, no residual headache or other neuro symptoms. Patient is here with improved pulmonary function test. Overall patient reports respiratory symptoms have been better since last appointment. She reports that she had hiatal hernia repair 3/13/23 which significantly improved her shortness of breath. She reports she has umbilical hernia repair last month. Patient has not required albuterol. Patient reports no physical limitation due to respiratory symptoms. Patient's past medical history is significant for asthma, arthritis, COVID-19, hypothyroidism, anemia. Pertinent negatives: Shortness of Breath, Cough, Sputum Production, Hemoptysis, Wheezing, and Chest Tightness  Risk factors for lung disease: no known risk factors    Progress History:   Since last visit any new medical issues? No  New ER or hospital visits for breathing/pulm reasons? No  Any new or changes in medicines? No  Using inhalers? as needed albuterol  Are they helpful? No  Any recent exacerbations? No  Last PFT: 2023 - moderate obstruction and mildly decreased DLCO  Last 6 MWT: 22 - Did not qualify for supplemental O2   Last A1AT: MM  HRCT 22 - no honeycombing, bronchiectasis, or fibrosis  Normal echo in 2022     Smoking History:  Never smoker  Reports secondhand smoke from parents growing up. 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  (Coal, dust, asbestos, molds ie

## 2023-11-02 ENCOUNTER — HOSPITAL ENCOUNTER (OUTPATIENT)
Dept: PULMONOLOGY | Age: 68
Discharge: HOME | End: 2023-11-02
Payer: MEDICARE

## 2023-11-02 DIAGNOSIS — R94.2 DECREASED DIFFUSION CAPACITY OF LUNG: ICD-10-CM

## 2023-11-02 DIAGNOSIS — J44.9 STAGE 2 MODERATE COPD BY GOLD CLASSIFICATION (HCC): ICD-10-CM

## 2023-11-02 PROCEDURE — 94060 EVALUATION OF WHEEZING: CPT

## 2023-11-02 PROCEDURE — 94726 PLETHYSMOGRAPHY LUNG VOLUMES: CPT

## 2023-11-02 PROCEDURE — 94729 DIFFUSING CAPACITY: CPT

## 2023-11-07 ENCOUNTER — OFFICE VISIT (OUTPATIENT)
Dept: PULMONOLOGY | Age: 68
End: 2023-11-07
Payer: MEDICARE

## 2023-11-07 VITALS
TEMPERATURE: 96.9 F | HEIGHT: 67 IN | OXYGEN SATURATION: 97 % | HEART RATE: 62 BPM | SYSTOLIC BLOOD PRESSURE: 116 MMHG | WEIGHT: 179.8 LBS | BODY MASS INDEX: 28.22 KG/M2 | DIASTOLIC BLOOD PRESSURE: 64 MMHG

## 2023-11-07 DIAGNOSIS — J44.9 STAGE 2 MODERATE COPD BY GOLD CLASSIFICATION (HCC): Primary | ICD-10-CM

## 2023-11-07 DIAGNOSIS — R94.2 DECREASED DIFFUSION CAPACITY OF LUNG: ICD-10-CM

## 2023-11-07 PROCEDURE — G8399 PT W/DXA RESULTS DOCUMENT: HCPCS

## 2023-11-07 PROCEDURE — 3017F COLORECTAL CA SCREEN DOC REV: CPT

## 2023-11-07 PROCEDURE — 99213 OFFICE O/P EST LOW 20 MIN: CPT

## 2023-11-07 PROCEDURE — 1090F PRES/ABSN URINE INCON ASSESS: CPT

## 2023-11-07 PROCEDURE — G8484 FLU IMMUNIZE NO ADMIN: HCPCS

## 2023-11-07 PROCEDURE — 1123F ACP DISCUSS/DSCN MKR DOCD: CPT

## 2023-11-07 PROCEDURE — 1036F TOBACCO NON-USER: CPT

## 2023-11-07 PROCEDURE — 3023F SPIROM DOC REV: CPT

## 2023-11-07 PROCEDURE — G8427 DOCREV CUR MEDS BY ELIG CLIN: HCPCS

## 2023-11-07 PROCEDURE — G8417 CALC BMI ABV UP PARAM F/U: HCPCS

## 2023-11-07 RX ORDER — ALBUTEROL SULFATE 90 UG/1
2 AEROSOL, METERED RESPIRATORY (INHALATION) EVERY 6 HOURS PRN
Qty: 8 G | Refills: 1 | Status: SHIPPED | OUTPATIENT
Start: 2023-11-07

## 2023-11-07 ASSESSMENT — ENCOUNTER SYMPTOMS
CHEST TIGHTNESS: 0
WHEEZING: 0
SINUS PRESSURE: 0
COUGH: 0
RHINORRHEA: 0
SHORTNESS OF BREATH: 0
SINUS PAIN: 0

## 2023-11-07 NOTE — PATIENT INSTRUCTIONS
Continue your albuterol inhaler. You may take 2 puffs every 6 hours as needed for shortness of breath or wheezing.       Please call with any breathing questions/concerns [phone]

## 2023-12-11 ENCOUNTER — HOSPITAL ENCOUNTER (OUTPATIENT)
Dept: WOMENS IMAGING | Age: 68
Discharge: HOME | End: 2023-12-11
Payer: MEDICARE

## 2023-12-11 VITALS — WEIGHT: 179.8 LBS | BODY MASS INDEX: 28.22 KG/M2 | HEIGHT: 67 IN

## 2023-12-11 DIAGNOSIS — Z12.31 VISIT FOR SCREENING MAMMOGRAM: ICD-10-CM

## 2023-12-11 PROCEDURE — 77063 BREAST TOMOSYNTHESIS BI: CPT

## 2024-03-19 ENCOUNTER — HOSPITAL ENCOUNTER (OUTPATIENT)
Age: 69
Discharge: HOME | End: 2024-03-19
Payer: MEDICARE

## 2024-03-19 LAB
BASOPHILS ABSOLUTE: 0 THOU/MM3 (ref 0–0.1)
BASOPHILS NFR BLD AUTO: 0.5 %
DEPRECATED RDW RBC AUTO: 45.2 FL (ref 35–45)
EOSINOPHIL NFR BLD AUTO: 1.9 %
EOSINOPHILS ABSOLUTE: 0.1 THOU/MM3 (ref 0–0.4)
ERYTHROCYTE [DISTWIDTH] IN BLOOD BY AUTOMATED COUNT: 12.4 % (ref 11.5–14.5)
HCT VFR BLD AUTO: 38.3 % (ref 37–47)
HGB BLD-MCNC: 11.8 GM/DL (ref 12–16)
IMM GRANULOCYTES # BLD AUTO: 0.01 THOU/MM3 (ref 0–0.07)
IMM GRANULOCYTES NFR BLD AUTO: 0.1 %
LYMPHOCYTES ABSOLUTE: 1.4 THOU/MM3 (ref 1–4.8)
LYMPHOCYTES NFR BLD AUTO: 18.6 %
MCH RBC QN AUTO: 30.4 PG (ref 26–33)
MCHC RBC AUTO-ENTMCNC: 30.8 GM/DL (ref 32.2–35.5)
MCV RBC AUTO: 98.7 FL (ref 81–99)
MONOCYTES ABSOLUTE: 0.5 THOU/MM3 (ref 0.4–1.3)
MONOCYTES NFR BLD AUTO: 6.7 %
NEUTROPHILS NFR BLD AUTO: 72.2 %
NRBC BLD AUTO-RTO: 0 /100 WBC
PLATELET # BLD AUTO: 298 THOU/MM3 (ref 130–400)
PMV BLD AUTO: 10.4 FL (ref 9.4–12.4)
RBC # BLD AUTO: 3.88 MILL/MM3 (ref 4.2–5.4)
SEGMENTED NEUTROPHILS ABSOLUTE COUNT: 5.3 THOU/MM3 (ref 1.8–7.7)
WBC # BLD AUTO: 7.3 THOU/MM3 (ref 4.8–10.8)

## 2024-03-19 PROCEDURE — 82746 ASSAY OF FOLIC ACID SERUM: CPT

## 2024-03-19 PROCEDURE — 83540 ASSAY OF IRON: CPT

## 2024-03-19 PROCEDURE — 85025 COMPLETE CBC W/AUTO DIFF WBC: CPT

## 2024-03-19 PROCEDURE — 83550 IRON BINDING TEST: CPT

## 2024-03-19 PROCEDURE — 82607 VITAMIN B-12: CPT

## 2024-03-19 PROCEDURE — 36415 COLL VENOUS BLD VENIPUNCTURE: CPT

## 2024-03-19 PROCEDURE — 82728 ASSAY OF FERRITIN: CPT

## 2024-03-20 LAB
FERRITIN SERPL IA-MCNC: 185 NG/ML (ref 10–291)
FOLATE SERPL-MCNC: 12.9 NG/ML (ref 4.8–24.2)
IRON SATN MFR SERPL: 37 % (ref 20–50)
IRON SERPL-MCNC: 100 UG/DL (ref 50–170)
TIBC SERPL-MCNC: 268 UG/DL (ref 171–450)
VIT B12 SERPL-MCNC: 489 PG/ML (ref 211–911)

## 2024-03-22 ENCOUNTER — HOSPITAL ENCOUNTER (EMERGENCY)
Age: 69
Discharge: HOME | End: 2024-03-23
Payer: MEDICARE

## 2024-03-22 VITALS
WEIGHT: 185 LBS | SYSTOLIC BLOOD PRESSURE: 121 MMHG | HEART RATE: 77 BPM | HEIGHT: 67 IN | RESPIRATION RATE: 17 BRPM | OXYGEN SATURATION: 97 % | BODY MASS INDEX: 29.03 KG/M2 | TEMPERATURE: 97.9 F | DIASTOLIC BLOOD PRESSURE: 67 MMHG

## 2024-03-22 DIAGNOSIS — M79.89 LEFT LEG SWELLING: Primary | ICD-10-CM

## 2024-03-22 DIAGNOSIS — M25.462 PAIN AND SWELLING OF LEFT KNEE: ICD-10-CM

## 2024-03-22 DIAGNOSIS — M25.562 PAIN AND SWELLING OF LEFT KNEE: ICD-10-CM

## 2024-03-22 DIAGNOSIS — Z96.652 STATUS POST LEFT KNEE REPLACEMENT: ICD-10-CM

## 2024-03-22 LAB
ALBUMIN SERPL BCG-MCNC: 4.1 G/DL (ref 3.5–5.1)
ALP SERPL-CCNC: 102 U/L (ref 38–126)
ALT SERPL W/O P-5'-P-CCNC: 28 U/L (ref 11–66)
ANION GAP SERPL CALC-SCNC: 10 MEQ/L (ref 8–16)
AST SERPL-CCNC: 22 U/L (ref 5–40)
BASOPHILS ABSOLUTE: 0 THOU/MM3 (ref 0–0.1)
BASOPHILS NFR BLD AUTO: 0.6 %
BILIRUB SERPL-MCNC: < 0.2 MG/DL (ref 0.3–1.2)
BUN SERPL-MCNC: 30 MG/DL (ref 7–22)
CALCIUM SERPL-MCNC: 9.1 MG/DL (ref 8.5–10.5)
CHLORIDE SERPL-SCNC: 105 MEQ/L (ref 98–111)
CO2 SERPL-SCNC: 26 MEQ/L (ref 23–33)
CREAT SERPL-MCNC: 0.9 MG/DL (ref 0.4–1.2)
CRP SERPL-MCNC: 0.59 MG/DL (ref 0–1)
DEPRECATED RDW RBC AUTO: 44.2 FL (ref 35–45)
EOSINOPHIL NFR BLD AUTO: 3 %
EOSINOPHILS ABSOLUTE: 0.2 THOU/MM3 (ref 0–0.4)
ERYTHROCYTE [DISTWIDTH] IN BLOOD BY AUTOMATED COUNT: 12.4 % (ref 11.5–14.5)
GFR SERPL CREATININE-BSD FRML MDRD: > 60 ML/MIN/1.73M2
GLUCOSE SERPL-MCNC: 130 MG/DL (ref 70–108)
HCT VFR BLD AUTO: 36.5 % (ref 37–47)
HGB BLD-MCNC: 11.5 GM/DL (ref 12–16)
IMM GRANULOCYTES # BLD AUTO: 0.02 THOU/MM3 (ref 0–0.07)
IMM GRANULOCYTES NFR BLD AUTO: 0.4 %
LYMPHOCYTES ABSOLUTE: 1.4 THOU/MM3 (ref 1–4.8)
LYMPHOCYTES NFR BLD AUTO: 27.3 %
MCH RBC QN AUTO: 30.5 PG (ref 26–33)
MCHC RBC AUTO-ENTMCNC: 31.5 GM/DL (ref 32.2–35.5)
MCV RBC AUTO: 96.8 FL (ref 81–99)
MONOCYTES ABSOLUTE: 0.4 THOU/MM3 (ref 0.4–1.3)
MONOCYTES NFR BLD AUTO: 8.4 %
NEUTROPHILS NFR BLD AUTO: 60.3 %
NRBC BLD AUTO-RTO: 0 /100 WBC
OSMOLALITY SERPL CALC.SUM OF ELEC: 289.2 MOSMOL/KG (ref 275–300)
PLATELET # BLD AUTO: 261 THOU/MM3 (ref 130–400)
PMV BLD AUTO: 10 FL (ref 9.4–12.4)
POTASSIUM SERPL-SCNC: 4.4 MEQ/L (ref 3.5–5.2)
PROT SERPL-MCNC: 6.9 G/DL (ref 6.1–8)
RBC # BLD AUTO: 3.77 MILL/MM3 (ref 4.2–5.4)
SEGMENTED NEUTROPHILS ABSOLUTE COUNT: 3 THOU/MM3 (ref 1.8–7.7)
SODIUM SERPL-SCNC: 141 MEQ/L (ref 135–145)
WBC # BLD AUTO: 5 THOU/MM3 (ref 4.8–10.8)

## 2024-03-22 PROCEDURE — 99284 EMERGENCY DEPT VISIT MOD MDM: CPT

## 2024-03-22 PROCEDURE — 96374 THER/PROPH/DIAG INJ IV PUSH: CPT

## 2024-03-22 PROCEDURE — 86140 C-REACTIVE PROTEIN: CPT

## 2024-03-22 PROCEDURE — 80053 COMPREHEN METABOLIC PANEL: CPT

## 2024-03-22 PROCEDURE — 6360000002 HC RX W HCPCS: Performed by: PHYSICIAN ASSISTANT

## 2024-03-22 PROCEDURE — 36415 COLL VENOUS BLD VENIPUNCTURE: CPT

## 2024-03-22 PROCEDURE — 85651 RBC SED RATE NONAUTOMATED: CPT

## 2024-03-22 PROCEDURE — 85025 COMPLETE CBC W/AUTO DIFF WBC: CPT

## 2024-03-22 RX ORDER — KETOROLAC TROMETHAMINE 30 MG/ML
15 INJECTION, SOLUTION INTRAMUSCULAR; INTRAVENOUS ONCE
Status: COMPLETED | OUTPATIENT
Start: 2024-03-22 | End: 2024-03-22

## 2024-03-22 RX ORDER — KETOROLAC TROMETHAMINE 30 MG/ML
30 INJECTION, SOLUTION INTRAMUSCULAR; INTRAVENOUS ONCE
Status: DISCONTINUED | OUTPATIENT
Start: 2024-03-22 | End: 2024-03-22

## 2024-03-22 RX ORDER — ENOXAPARIN SODIUM 100 MG/ML
1 INJECTION SUBCUTANEOUS ONCE
Status: COMPLETED | OUTPATIENT
Start: 2024-03-23 | End: 2024-03-23

## 2024-03-22 RX ORDER — OXYCODONE HYDROCHLORIDE 5 MG/1
5 TABLET ORAL EVERY 6 HOURS PRN
Qty: 12 TABLET | Refills: 0 | Status: SHIPPED | OUTPATIENT
Start: 2024-03-22 | End: 2024-03-25

## 2024-03-22 RX ADMIN — KETOROLAC TROMETHAMINE 15 MG: 30 INJECTION, SOLUTION INTRAMUSCULAR at 22:52

## 2024-03-22 ASSESSMENT — PAIN SCALES - GENERAL
PAINLEVEL_OUTOF10: 8
PAINLEVEL_OUTOF10: 8

## 2024-03-22 ASSESSMENT — PAIN DESCRIPTION - ORIENTATION
ORIENTATION: LEFT
ORIENTATION: LEFT

## 2024-03-22 ASSESSMENT — PAIN DESCRIPTION - ONSET: ONSET: ON-GOING

## 2024-03-22 ASSESSMENT — PAIN DESCRIPTION - FREQUENCY: FREQUENCY: CONTINUOUS

## 2024-03-22 ASSESSMENT — PAIN DESCRIPTION - LOCATION
LOCATION: LEG
LOCATION: LEG

## 2024-03-22 ASSESSMENT — PAIN - FUNCTIONAL ASSESSMENT: PAIN_FUNCTIONAL_ASSESSMENT: 0-10

## 2024-03-23 ENCOUNTER — HOSPITAL ENCOUNTER (OUTPATIENT)
Dept: INTERVENTIONAL RADIOLOGY/VASCULAR | Age: 69
End: 2024-03-23
Payer: MEDICARE

## 2024-03-23 ENCOUNTER — HOSPITAL ENCOUNTER (EMERGENCY)
Age: 69
Discharge: HOME | End: 2024-03-23
Attending: EMERGENCY MEDICINE
Payer: MEDICARE

## 2024-03-23 ENCOUNTER — HOSPITAL ENCOUNTER (OUTPATIENT)
Age: 69
Discharge: HOME | End: 2024-03-23
Payer: MEDICARE

## 2024-03-23 VITALS
TEMPERATURE: 98 F | SYSTOLIC BLOOD PRESSURE: 129 MMHG | DIASTOLIC BLOOD PRESSURE: 79 MMHG | OXYGEN SATURATION: 98 % | RESPIRATION RATE: 20 BRPM | HEART RATE: 64 BPM

## 2024-03-23 DIAGNOSIS — M79.605 LEFT LEG PAIN: ICD-10-CM

## 2024-03-23 DIAGNOSIS — I82.442 DEEP VEIN THROMBOSIS (DVT) OF TIBIAL VEIN OF LEFT LOWER EXTREMITY, UNSPECIFIED CHRONICITY (HCC): Primary | ICD-10-CM

## 2024-03-23 DIAGNOSIS — M25.462 PAIN AND SWELLING OF KNEE, LEFT: ICD-10-CM

## 2024-03-23 DIAGNOSIS — M25.562 PAIN AND SWELLING OF KNEE, LEFT: ICD-10-CM

## 2024-03-23 LAB — ERYTHROCYTE [SEDIMENTATION RATE] IN BLOOD BY WESTERGREN METHOD: 9 MM/HR (ref 0–20)

## 2024-03-23 PROCEDURE — 99283 EMERGENCY DEPT VISIT LOW MDM: CPT

## 2024-03-23 PROCEDURE — 93971 EXTREMITY STUDY: CPT

## 2024-03-23 PROCEDURE — 96372 THER/PROPH/DIAG INJ SC/IM: CPT

## 2024-03-23 PROCEDURE — 6360000002 HC RX W HCPCS: Performed by: PHYSICIAN ASSISTANT

## 2024-03-23 RX ORDER — IBUPROFEN 800 MG/1
800 TABLET ORAL EVERY 6 HOURS PRN
COMMUNITY
Start: 2024-01-04 | End: 2024-03-25 | Stop reason: ALTCHOICE

## 2024-03-23 RX ORDER — OXYCODONE HYDROCHLORIDE AND ACETAMINOPHEN 5; 325 MG/1; MG/1
TABLET ORAL
COMMUNITY
Start: 2024-02-20

## 2024-03-23 RX ORDER — BUPROPION HYDROCHLORIDE 150 MG/1
150 TABLET ORAL DAILY
COMMUNITY

## 2024-03-23 RX ADMIN — ENOXAPARIN SODIUM 80 MG: 100 INJECTION SUBCUTANEOUS at 00:05

## 2024-03-23 ASSESSMENT — PAIN - FUNCTIONAL ASSESSMENT: PAIN_FUNCTIONAL_ASSESSMENT: 0-10

## 2024-03-23 ASSESSMENT — PAIN SCALES - GENERAL: PAINLEVEL_OUTOF10: 3

## 2024-03-23 NOTE — DISCHARGE INSTRUCTIONS
Return to the ER triage desk at 8 AM and tell them you are here for an outpatient ultrasound and that you were in the ER last night

## 2024-03-23 NOTE — ED PROVIDER NOTES
[facility]  EMERGENCY MEDICINE ATTENDING ATTESTATION      Evaluation of Sofya Lemos.   Case discussed and care plan developed with resident physician.   I agree with the resident physician documentation and plan as documented by him, except if my documentation differs.   Patient seen under indirect supervision  I reviewed the medical, surgical, family and social history, medications and allergies.   I have reviewed and interpreted all available lab, radiology and ekg results available at the moment.  I have reviewed the nursing documentation.       Please see the resident physician completed note for final disposition except as documented on this attestation.   I have reviewed and interpreted all available lab, radiology and ekg results available at the moment.  Diagnosis, treatment and disposition plans were discussed and agreed upon by patient.   This transcription was electronically signed. It was dictated by use of voice recognition software and electronically transcribed. The transcription may contain errors not detected in proofreading.     I performed direct supervision and was present for the critical portion following procedures: None  Critical care time on this case: None    Electronically signed by Seferino Carballo MD on 3/23/24 at 11:05 AM Seferino Velázquez MD  03/23/24 3920

## 2024-03-23 NOTE — ED TRIAGE NOTES
Pt arrives to ED from home with c/o left knee pain, pt reports being in ED last night. US was ordered outpatient for this morning. Pt returned to ED from Vascular with confirmed blood clot posterior subacute tibial and anterior tibial.   Pt denies any other symptoms at this time  Was given shot of Lovenox prior to discharge last night

## 2024-03-23 NOTE — DISCHARGE INSTRUCTIONS
You were seen today for findings of left posterior and anterior tibial venous thrombi on ultrasound today. As discussed, start taking Eliquis and follow up with PCP as soon as possible for further medical management.     Continue to take your medication as indicated and prescribed.  For pain use acetaminophen (Tylenol) or ibuprofen (Motrin / Advil), unless prescribed medications that have acetaminophen or ibuprofen (or similar medications) in it.  You can take over the counter acetaminophen tablets (1 - 2 tablets of the 500-mg strength every 6 hours) or ibuprofen tablets (2 tablets every 4 hours).    PLEASE RETURN TO THE EMERGENCY DEPARTMENT IMMEDIATELY for worsening of pain, inability to move your muscle, worsening of pain, tingling or loss of sensation, inability to move your wrist or fingers, or if you develop any concerning symptoms such as: high fever not relieved by acetaminophen (Tylenol) and/or ibuprofen (Motrin / Advil), chills, feeling of your heart fluttering or racing, persistent nausea and/or vomiting, vomiting up blood, blood in your stool, loss of consciousness, numbness, weakness or tingling in the arms or legs or change in color of the extremities, changes in mental status, persistent headache, blurry vision, loss of bladder / bowel control, unable to follow up with your physician, or other any other care or concern.

## 2024-03-23 NOTE — ED PROVIDER NOTES
visit)      Radiologic studies results available at the moment of this note (None if blank):  No orders to display     See ED course below for my interpretation if applicable.  All radiology images independently reviewed by me in the clinical context of this patient, in addition to interpretation provided by the radiologist.      EKG interpretation (none if blank):  Not applicable  All EKG results are individually reviewed and interpreted by me in the clinical context of this patient.  All EKGs are also interpreted by our Cardiology department, final interpretation may not be available as of the writing of this note.      PREVIOUS RECORDS  AND EXTERNAL INFORMATION REVIEWED   History obtained from: the patient.  Pertinent previous and/or external records reviewed: Noncontributory.  Case discussed with specialties other than Emergency Medicine: Not Applicable      MEDICAL DECISION MAKING   Initial plan:  D/c with new Eliquis prescription    Comorbid conditions pertinent to this ED encounter:  Not applicable      Differential Diagnosis includes but is not limited to:  DVT of LLE     Decision Rules/Clinical Scores utilized:  Not Applicable       Code Status:  Not addressed during this ED visit    Social determinants of health impacting treatment or disposition:  Considered and not applicable     MIPS documentation:  N/A    Medical Decision Making  68F not on blood thinners who presents to the ED for evaluation of found LLE PT & AT DVTs. States that she had a total knee replacement on 2/19 at [facility]. Of note, she was seen in the ED last night and referred to vascular clinic for further evaluation - was also given single dose of Lovenox and Percocet for pain prior to discharge.  Percocet has fully alleviated pain.  Vitals stable.  Left knee with mild tenderness upon palpation and swelling without erythema or other overlying skin changes.  Scar is clean without streaking or discharge.  Shared decision-making with the  patient - she wishes to start taking Eliquis at this time.  Sent prescription to pharmacy advised patient to follow-up with PCP for further management as soon as possible.  Patient agreeable to plan and discharged in stable condition.    ED COURSE   ED Medications administered this visit (None if left blank):   Medications - No data to display             PROCEDURES: (None if blank)  Procedures:     CRITICAL CARE:  None    MEDICATION CHANGES     New Prescriptions    APIXABAN (ELIQUIS) 5 MG TABS TABLET    Take 1 tablet by mouth 2 times daily         FINAL DISPOSITION   Shared Decision-Making was performed, disposition discussed with the patient/family and questions answered.      Outpatient follow up (If applicable):  Naina Schumacher, APRN - CNP  [address] [facility] [address]  [phone]    Schedule an appointment as soon as possible for a visit       [facility] EMERGENCY DEPT  [address] [place] [id number] [phone]  Go to   If symptoms worsen    The results of pertinent diagnostic studies and exam findings were discussed with the patient/surrogate. The patient’s provisional diagnosis and plan of care were discussed with the patient and present family who expressed understanding. Medications were reviewed and indications and risks of medications were discussed with the patient/family present. Strict return precautions and follow up instructions were discussed with the patient prior to discharge, with which the patient agrees.          FINAL DIAGNOSES:  Final diagnoses:   Deep vein thrombosis (DVT) of tibial vein of left lower extremity, unspecified chronicity (HCC)   Pain and swelling of knee, left       Condition: condition: stable  Dispo: Discharge to home  DISPOSITION Decision To Discharge 03/23/2024 11:34:04 AM      This transcription was electronically signed. It was dictated by use of voice recognition software and electronically transcribed. The transcription may contain errors not

## 2024-03-23 NOTE — ED NOTES
Pt given two warm blankets, no other complaints at this time  Patient resting in bed. Respirations easy and unlabored. No distress noted. Call light within reach.

## 2024-03-23 NOTE — ED PROVIDER NOTES
[facility] EMERGENCY DEPT      Pt Name: Sofya Lemos  MRN: [id number]  Birthdate 1955  Date of evaluation: 3/22/2024  Provider: Cydney Thompson PA-C    CHIEF COMPLAINT       Chief Complaint   Patient presents with    Leg Swelling     Left with numbness in feet       Nurses Notes reviewed and I agree except as noted in the HPI.      HISTORY OF PRESENT ILLNESS    Sofya Lemos is a 68 y.o. female who presents from home by private vehicle with her mother for left knee pain and swelling.  Patient had a left knee replacement on 2-19 by Dr. Ramon out of [facility] in [place].  She had a follow-up appointment 2 weeks ago to have her staples removed.  She feels that she has been doing well overall.  She is been going to physical therapy and yesterday bent her knee to 103 degrees which is the best she has done.  Her Steri-Strips have been removed and the last 2 days she has not had to wear her SEAN hose.      At 0200 patient was woke up with knee pain.  She describes it as an aching and soreness.  She now is having trouble bending the knee.  It is also swollen more than normal.  Patient has numbness in her third, fourth, and fifth toes but is able to move them.  Patient reports that since her surgery she has never been pain-free.  There is always some pain present which waxes and wanes.  The knee has also always felt hot.  Patient has tried Tylenol without much relief.  The patient denies fever, chills, weakness, change in appetite, chest pain, dyspnea, nausea, vomiting, inability to ambulate, or other complaints.  The patient is taking prescription aspirin from her orthopedic doctor but only has a few days left.  Next appointment is on 4-11 patient has a history of hiatal hernia repair and is taking stool softeners for constipation since her surgery.     PAST MEDICAL HISTORY    has a past medical history of Arthritis of left knee, Hiatal hernia, History of blood transfusion, History of kidney stones,  3/22/2024 11:49 PM        START taking these medications    Details   oxyCODONE (ROXICODONE) 5 MG immediate release tablet Take 1 tablet by mouth every 6 hours as needed for Pain for up to 3 days. Intended supply: 3 days. Take lowest dose possible to manage pain Max Daily Amount: 20 mg, Disp-12 tablet, R-0Normal             (Please note that portions of this note were completed with a voice recognition program.  Efforts were made to edit the dictations but occasionally words are mis-transcribed.)    Cydney Thompson PA-C 03/23/24 12:48 AM    DAMARIS Gordon Jennifer, PA-C  03/23/24 005

## 2024-03-23 NOTE — ED TRIAGE NOTES
Pt presents to ED via lobby for evaluation of left leg swelling with numbness in foot. Pt states onset of numbness on 3/21. Pulses noted in left foot at this time by this RN. Pt states she did have knee surgery on 2/19. Current pain rated 8/10. Denies pain medications prior to arrival. Pt denies CP or SOB. Pt states onset of numbness upon waking up. Pt states pain and numbness did wake her up from sleep. Vitals, labs obtained at this time.

## 2024-03-25 PROBLEM — T81.72XA POSTOPERATIVE ACUTE DEEP VEIN THROMBOSIS OF LOWER EXTREMITY (HCC): Status: ACTIVE | Noted: 2024-03-25

## 2024-03-25 PROBLEM — I82.409 POSTOPERATIVE ACUTE DEEP VEIN THROMBOSIS OF LOWER EXTREMITY (HCC): Status: ACTIVE | Noted: 2024-03-25

## 2024-03-27 ENCOUNTER — HOSPITAL ENCOUNTER (OUTPATIENT)
Dept: CT IMAGING | Age: 69
Discharge: HOME | End: 2024-03-27
Payer: MEDICARE

## 2024-03-27 ENCOUNTER — HOSPITAL ENCOUNTER (OUTPATIENT)
Age: 69
Discharge: HOME | End: 2024-03-27
Payer: MEDICARE

## 2024-03-27 DIAGNOSIS — I10 HYPERTENSION, UNSPECIFIED TYPE: ICD-10-CM

## 2024-03-27 DIAGNOSIS — R06.02 SHORTNESS OF BREATH: ICD-10-CM

## 2024-03-27 DIAGNOSIS — E03.9 HYPOTHYROIDISM, UNSPECIFIED TYPE: ICD-10-CM

## 2024-03-27 DIAGNOSIS — R07.89 OTHER CHEST PAIN: ICD-10-CM

## 2024-03-27 LAB
CHOLEST SERPL-MCNC: 220 MG/DL (ref 100–199)
CREAT BLD-MCNC: 0.7 MG/DL (ref 0.5–1.2)
GFR SERPL CREATININE-BSD FRML MDRD: > 90 ML/MIN/1.73M2
HDLC SERPL-MCNC: 79 MG/DL
LDLC SERPL CALC-MCNC: 126 MG/DL
TRIGL SERPL-MCNC: 77 MG/DL (ref 0–199)
TSH SERPL DL<=0.005 MIU/L-ACNC: 0.97 UIU/ML (ref 0.4–4.2)

## 2024-03-27 PROCEDURE — 71275 CT ANGIOGRAPHY CHEST: CPT

## 2024-03-27 PROCEDURE — 6360000004 HC RX CONTRAST MEDICATION

## 2024-03-27 PROCEDURE — 36415 COLL VENOUS BLD VENIPUNCTURE: CPT

## 2024-03-27 PROCEDURE — 82565 ASSAY OF CREATININE: CPT

## 2024-03-27 PROCEDURE — 84443 ASSAY THYROID STIM HORMONE: CPT

## 2024-03-27 PROCEDURE — 80061 LIPID PANEL: CPT

## 2024-03-27 RX ADMIN — IOPAMIDOL 100 ML: 755 INJECTION, SOLUTION INTRAVENOUS at 12:35

## 2024-04-04 PROBLEM — E66.811 CLASS 1 OBESITY WITH BODY MASS INDEX (BMI) OF 33.0 TO 33.9 IN ADULT: Status: RESOLVED | Noted: 2022-09-20 | Resolved: 2024-04-04

## 2024-04-04 PROBLEM — E66.9 CLASS 1 OBESITY WITH BODY MASS INDEX (BMI) OF 33.0 TO 33.9 IN ADULT: Status: RESOLVED | Noted: 2022-09-20 | Resolved: 2024-04-04

## 2024-04-04 PROBLEM — I20.89 ANGINAL EQUIVALENT (HCC): Status: RESOLVED | Noted: 2023-06-23 | Resolved: 2024-04-04

## 2024-04-04 PROBLEM — J18.9 CAP (COMMUNITY ACQUIRED PNEUMONIA): Status: RESOLVED | Noted: 2022-08-28 | Resolved: 2024-04-04

## 2024-04-04 PROBLEM — R06.00 DYSPNEA: Status: RESOLVED | Noted: 2022-08-28 | Resolved: 2024-04-04

## 2024-04-04 PROBLEM — Z86.16 PERSONAL HISTORY OF COVID-19: Status: RESOLVED | Noted: 2022-09-20 | Resolved: 2024-04-04

## 2024-06-21 ENCOUNTER — HOSPITAL ENCOUNTER (OUTPATIENT)
Dept: INTERVENTIONAL RADIOLOGY/VASCULAR | Age: 69
End: 2024-06-21
Attending: INTERNAL MEDICINE
Payer: MEDICARE

## 2024-06-21 DIAGNOSIS — Z86.718 PERSONAL HISTORY OF VENOUS THROMBOSIS AND EMBOLISM: ICD-10-CM

## 2024-06-21 PROCEDURE — 93971 EXTREMITY STUDY: CPT

## 2024-07-18 ENCOUNTER — HOSPITAL ENCOUNTER (OUTPATIENT)
Dept: PHARMACY | Age: 69
Setting detail: THERAPIES SERIES
Discharge: HOME | End: 2024-07-18
Payer: MEDICARE

## 2024-07-18 DIAGNOSIS — Z51.81 ENCOUNTER FOR THERAPEUTIC DRUG LEVEL MONITORING: ICD-10-CM

## 2024-07-18 DIAGNOSIS — Z79.01 ANTICOAGULATED ON COUMADIN: ICD-10-CM

## 2024-07-18 DIAGNOSIS — Z86.718 HISTORY OF DVT (DEEP VEIN THROMBOSIS): Primary | ICD-10-CM

## 2024-07-18 LAB — POC INR: 1.2 (ref 0.8–1.2)

## 2024-07-18 PROCEDURE — 99213 OFFICE O/P EST LOW 20 MIN: CPT | Performed by: PHARMACIST

## 2024-07-18 PROCEDURE — 36416 COLLJ CAPILLARY BLOOD SPEC: CPT | Performed by: PHARMACIST

## 2024-07-18 PROCEDURE — 85610 PROTHROMBIN TIME: CPT | Performed by: PHARMACIST

## 2024-07-18 RX ORDER — VALACYCLOVIR HYDROCHLORIDE 500 MG/1
500 TABLET, FILM COATED ORAL 2 TIMES DAILY PRN
COMMUNITY

## 2024-07-18 RX ORDER — WARFARIN SODIUM 7.5 MG/1
TABLET ORAL
COMMUNITY
Start: 2024-07-15

## 2024-07-18 RX ORDER — ASPIRIN 81 MG/1
81 TABLET ORAL DAILY
COMMUNITY

## 2024-07-18 RX ORDER — WARFARIN SODIUM 5 MG/1
TABLET ORAL
COMMUNITY
Start: 2024-07-15

## 2024-07-18 NOTE — PROGRESS NOTES
Medication Management Clinic  [facility]  AnticoagulationClinic  [phone] (phone)  [phone] (fax)     Patient presents to the Anticoagulation clinic today for assessment and initial dosing of warfarin.  Patient has a diagnosis of DVT and has been placed on Coumadin with a goal INR 2-3.  Pt started Coumadin 7/15/24, as ordered by Dr. Doe.     Sofya Lemos was given full education today in the clinic including written materials, supplemental oral instructions, and all questions were answered.  Specifically, Sofya was instructed to notfiy the Anticoagulation clinic immediately if there is any unusual bleeding, such as throwing or coughing up blood, bleeding from the nose, mouth, ears, or pink-red tinge in the urine or if the stools contain bright red blood or are black and tarry.       In addition, Sofya was informed to notify the clinic about any and all medicine changes, including prescriptions, “over-the-counter” or nonprescription medicines, vitamins, herbs, supplements, creams, rubs, eye or ear drops, and injections, whether to be used short- or long-term, within 24 hours.  The patient was also instructed to let all other physicians and pharmacists know that warfarin was started as a double-check against drug interactions.  The patient was further instructed on the effects of vitamin K containing foods on warfarin and the importance of consistency was stressed.  Sofya was also advised to avoid large amounts of alcohol, grapefruit juice or cranberry juice while on warfarin.      HPI:    Medication changes: reviewed list  Tablet strength per patient: 5mg and 7.5mg  Patient reported dosing regimen over last 1 week: 7.5mg x2, then 5mg x 1  Missed doses in the last 1-2 weeks: none  Extra doses in the last 1-2 weeks: none  Any problems with bleeding/bruising? No  Any recent falls? No   Any signs or symptoms of DVT/PE or stroke? No  Alcohol use: ocassional single drink  Tobacco use:

## 2024-07-22 ENCOUNTER — HOSPITAL ENCOUNTER (OUTPATIENT)
Dept: PHARMACY | Age: 69
Setting detail: THERAPIES SERIES
Discharge: HOME | End: 2024-07-22
Payer: MEDICARE

## 2024-07-22 DIAGNOSIS — Z79.01 ANTICOAGULATED ON COUMADIN: ICD-10-CM

## 2024-07-22 DIAGNOSIS — Z51.81 ENCOUNTER FOR THERAPEUTIC DRUG LEVEL MONITORING: ICD-10-CM

## 2024-07-22 DIAGNOSIS — Z86.718 HISTORY OF DVT (DEEP VEIN THROMBOSIS): Primary | ICD-10-CM

## 2024-07-22 LAB — POC INR: 1.8 (ref 0.8–1.2)

## 2024-07-22 PROCEDURE — 36416 COLLJ CAPILLARY BLOOD SPEC: CPT | Performed by: PHARMACIST

## 2024-07-22 PROCEDURE — 99212 OFFICE O/P EST SF 10 MIN: CPT | Performed by: PHARMACIST

## 2024-07-22 PROCEDURE — 85610 PROTHROMBIN TIME: CPT | Performed by: PHARMACIST

## 2024-07-22 NOTE — PROGRESS NOTES
Medication Management Clinic  [facility]  Anticoagulation Clinic  [phone] (phone)  [phone] (fax)    Ms. Sofya Lemos is a 68 y.o.  female with history of DVT who presents today for anticoagulation monitoring and adjustment.    Patient verifies current dosing regimen and tablet strength.  No missed or extra doses.  Patient denies s/s bleeding/bruising/swelling/SOB  No blood in urine or stool.  No dietary changes.  No changes in medication/OTC agents/Herbals.  No change in alcohol use or tobacco use.  No change in activity level.  Patient denies falls.  No vomiting/diarrhea or acute illness.   No Procedures scheduled in the future at this time.    Assessment:   Lab Results   Component Value Date    INR 1.80 (H) 2024    INR 1.20 2024    INR 0.98 2022     INR subtherapeutic   Recent Labs     24  0908   INR 1.80*      Patient interview completed and discussed with pharmacist by Cam AshleyD candidate      Plan:  Coumadin 7.5 mg today then start Coumadin 5 mg MWF, 7.5 mg all other days.  Recheck INR in 1 week(s).  Patient reminded to call the Anticoagulation Clinic with any signs or symptoms of bleeding or with any medication changes.  Patient given instructions utilizing the teach back method.    After visit summary printed and reviewed with patient.      Discharged ambulatory in no apparent distress.    For Pharmacy Admin Tracking Only    Intervention Detail: Adherence Monitorin and Dose Adjustment: 1, reason: Therapy Optimization  Total # of Interventions Recommended: 2  Total # of Interventions Accepted: 2  Time Spent (min): 20    Gabi HamiltonD, BCPS  2024  9:29 AM

## 2024-07-30 ENCOUNTER — HOSPITAL ENCOUNTER (OUTPATIENT)
Dept: PHARMACY | Age: 69
Setting detail: THERAPIES SERIES
Discharge: HOME | End: 2024-07-30
Payer: MEDICARE

## 2024-07-30 DIAGNOSIS — Z79.01 ANTICOAGULATED ON COUMADIN: ICD-10-CM

## 2024-07-30 DIAGNOSIS — Z86.718 HISTORY OF DVT (DEEP VEIN THROMBOSIS): Primary | ICD-10-CM

## 2024-07-30 DIAGNOSIS — Z51.81 ENCOUNTER FOR THERAPEUTIC DRUG LEVEL MONITORING: ICD-10-CM

## 2024-07-30 LAB — POC INR: 2.6 (ref 0.8–1.2)

## 2024-07-30 PROCEDURE — 85610 PROTHROMBIN TIME: CPT

## 2024-07-30 PROCEDURE — 36416 COLLJ CAPILLARY BLOOD SPEC: CPT

## 2024-07-30 PROCEDURE — 99211 OFF/OP EST MAY X REQ PHY/QHP: CPT

## 2024-07-30 NOTE — PROGRESS NOTES
Medication Management Clinic  [facility]  Anticoagulation Clinic  [phone] (phone)  [phone] (fax)    Ms. Sofya Lemos is a 68 y.o.  female with history of DVT who presents today for anticoagulation monitoring and adjustment.    Patient verifies current dosing regimen and tablet strength.  No missed or extra doses.  Patient denies s/s bleeding/bruising/swelling/SOB  No blood in urine or stool.  No dietary changes.  No changes in medication/OTC agents/Herbals.  No change in alcohol use or tobacco use.  No change in activity level.  Patient denies falls.  No vomiting/diarrhea or acute illness.   No Procedures scheduled in the future at this time.    Assessment:   Lab Results   Component Value Date    INR 2.60 (H) 07/30/2024    INR 1.80 (H) 07/22/2024    INR 1.20 07/18/2024     INR therapeutic   Recent Labs     07/30/24  1029   INR 2.60*        Plan:  Continue Coumadin 5 mg M,W,F and 7.5 mg all other days.  Recheck INR in 1.5 week(s).  Patient reminded to call the Anticoagulation Clinic with any signs or symptoms of bleeding or with any medication changes.  Patient given instructions utilizing the teach back method.       After visit summary printed and reviewed with patient.      Discharged ambulatory in no apparent distress.    For Pharmacy Admin Tracking Only    Time Spent (min): 20      Discussed with Geraldine Cedeno, GabiD    Cristiana Barnes PharmD  PGY1 Pharmacy Resident  7/30/2024 10:47 AM

## 2024-08-08 ENCOUNTER — ANTI-COAG VISIT (OUTPATIENT)
Age: 69
End: 2024-08-08
Payer: MEDICARE

## 2024-08-08 DIAGNOSIS — Z86.718 HISTORY OF DVT (DEEP VEIN THROMBOSIS): Primary | ICD-10-CM

## 2024-08-08 DIAGNOSIS — Z51.81 ENCOUNTER FOR THERAPEUTIC DRUG LEVEL MONITORING: ICD-10-CM

## 2024-08-08 DIAGNOSIS — Z79.01 ANTICOAGULATED ON COUMADIN: ICD-10-CM

## 2024-08-08 LAB — POC INR: 1.7 (ref 0.8–1.2)

## 2024-08-08 PROCEDURE — 99212 OFFICE O/P EST SF 10 MIN: CPT

## 2024-08-08 PROCEDURE — 85610 PROTHROMBIN TIME: CPT

## 2024-08-08 NOTE — PROGRESS NOTES
distress.     For Pharmacy Admin Tracking Only    Intervention Detail: Dose Adjustment: 1, reason: Therapy Optimization  Total # of Interventions Recommended: 1  Total # of Interventions Accepted: 1  Time Spent (min):  23

## 2024-08-15 ENCOUNTER — ANTI-COAG VISIT (OUTPATIENT)
Age: 69
End: 2024-08-15
Payer: MEDICARE

## 2024-08-15 DIAGNOSIS — Z79.01 ANTICOAGULATED ON COUMADIN: ICD-10-CM

## 2024-08-15 DIAGNOSIS — Z51.81 ENCOUNTER FOR THERAPEUTIC DRUG LEVEL MONITORING: ICD-10-CM

## 2024-08-15 DIAGNOSIS — Z86.718 HISTORY OF DVT (DEEP VEIN THROMBOSIS): Primary | ICD-10-CM

## 2024-08-15 LAB — POC INR: 1.4 (ref 0.8–1.2)

## 2024-08-15 PROCEDURE — 99212 OFFICE O/P EST SF 10 MIN: CPT

## 2024-08-15 PROCEDURE — 85610 PROTHROMBIN TIME: CPT

## 2024-08-15 NOTE — PROGRESS NOTES
Medication Management Clinic  [facility]  Anticoagulation Clinic  [phone] (phone)  [phone] (fax)    Ms. Sofya Lemos is a 68 y.o.  female with history of DVT who presents today for anticoagulation monitoring and adjustment.    Patient verifies current dosing regimen and tablet strength.  No missed or extra doses.  Patient denies s/s bleeding/bruising/swelling/SOB  No blood in urine or stool.  No dietary changes.  No changes in medication/OTC agents/Herbals.  No change in alcohol use or tobacco use.  No change in activity level.  Patient denies falls.  No vomiting/diarrhea or acute illness.   Urology ultrasound next week 8/21.     Assessment:   Lab Results   Component Value Date    INR 1.40 (H) 08/15/2024    INR 1.70 (H) 08/08/2024    INR 2.60 (H) 07/30/2024     INR subtherapeutic   Recent Labs     08/15/24  0734   INR 1.40*      Patient interview completed and discussed with pharmacist by Stcay Oconnell PharmD    Plan:  Coumadin 12.5 mg x1, then 10 mg x1, and then increase Coumadin to 7.5 mg every day.  Recheck INR in 1 week(s).   Patient reminded to call the Anticoagulation Clinic with signs or symptoms of bleeding or with any medication changes. Patient given instructions utilizing the teach back method.       After visit summary printed and reviewed with patient.      Discharged ambulatory in no apparent distress.    For Pharmacy Admin Tracking Only    Intervention Detail: Dose Adjustment: 2, reason: Therapy Optimization  Total # of Interventions Recommended: 2  Total # of Interventions Accepted: 2  Time Spent (min): 20

## 2024-08-21 ENCOUNTER — HOSPITAL ENCOUNTER (OUTPATIENT)
Dept: ULTRASOUND IMAGING | Age: 69
Discharge: HOME | End: 2024-08-21
Payer: MEDICARE

## 2024-08-21 ENCOUNTER — ANTI-COAG VISIT (OUTPATIENT)
Age: 69
End: 2024-08-21
Payer: MEDICARE

## 2024-08-21 DIAGNOSIS — Z79.01 ANTICOAGULATED ON COUMADIN: ICD-10-CM

## 2024-08-21 DIAGNOSIS — Z51.81 ENCOUNTER FOR THERAPEUTIC DRUG LEVEL MONITORING: ICD-10-CM

## 2024-08-21 DIAGNOSIS — N28.1 RENAL CYST, LEFT: ICD-10-CM

## 2024-08-21 DIAGNOSIS — Z86.718 HISTORY OF DVT (DEEP VEIN THROMBOSIS): Primary | ICD-10-CM

## 2024-08-21 LAB — POC INR: 1.7 (ref 0.8–1.2)

## 2024-08-21 PROCEDURE — 99212 OFFICE O/P EST SF 10 MIN: CPT

## 2024-08-21 PROCEDURE — 76770 US EXAM ABDO BACK WALL COMP: CPT

## 2024-08-21 PROCEDURE — 85610 PROTHROMBIN TIME: CPT

## 2024-08-21 NOTE — PROGRESS NOTES
Medication Management Clinic  [facility]  Anticoagulation Clinic  [phone] (phone)  [phone] (fax)    Ms. Sofya Lemos is a 68 y.o.  female with history of DVT (post-op), per Dr. Doe's referral, who presents today for Warfarin monitoring and adjustment (1 week visit after increasing dose by 5 mg/week - second increase in a row).    Patient verifies current dosing regimen and tablet strengths. Just got last refill of 7.5 mg tablets, but has good supply of 5 mg tablets.  No missed or extra doses, except for boluses ordered last visit.  Patient denies bruising/SOB. Will be seeing doctor regarding swelling of left ankle/foot she's had since fall a few weeks ago, plus scab that doesn't want to heal on shin.  Had some blood on tissue yesterday when she blew her nose after getting out of shower.  No blood in urine or stool.  No dietary changes. Denies having green tea/V8 juice/Boost/Ensure/Glucerna/liver/extra eggs.  No changes in medication/OTC agents/herbals. States will start eye drops 2 days before cataract surgery. Will be taking Amoxil for 9/10 dental visit.  No change in alcohol use or tobacco use.  No change in activity level.  Patient denies falls.  No vomiting/diarrhea or acute illness.   Procedures scheduled in the future at this time: left cataract extraction per Dr. Kaur 8/27, then other eye 9/3.    Assessment:   Lab Results   Component Value Date    INR 1.70 (H) 08/21/2024    INR 1.40 (H) 08/15/2024    INR 1.70 (H) 08/08/2024     INR subtherapeutic - goal 2-3.   Recent Labs     08/21/24  1011   INR 1.70*        Plan:  POCT INR performed/result reviewed.  11.25 mg today, W, then increase PO Coumadin to 11.25 mg MF, 7.5 mg TWThSS (from 7.5 mg daily=14.3% increase).  Recheck INR in 2 week(s). (Report given - orders entered by EVELIA Chin RPh., PharmD.)  Patient reminded to call the Anticoagulation Clinic with any signs or symptoms of bleeding or with any medication changes.  Patient

## 2024-08-23 ENCOUNTER — TELEPHONE (OUTPATIENT)
Age: 69
End: 2024-08-23

## 2024-08-23 NOTE — TELEPHONE ENCOUNTER
Patient called and states that she has come down with a sinus infection.  She is wondering what she can take over the counter for this that won't interfere with her Coumadin.  Please call her back  [phone].

## 2024-08-23 NOTE — TELEPHONE ENCOUNTER
Called patient. Discussed over the counter options. Reminded patient to avoid NSAIDs and stick to tylenol based pain relievers. All questions answered.

## 2024-08-26 ENCOUNTER — TELEPHONE (OUTPATIENT)
Age: 69
End: 2024-08-26

## 2024-08-26 NOTE — TELEPHONE ENCOUNTER
Spoke to Sofya.  She is feeling better.  Instructed that Nyquil and Airborne chewable do not typically interact with Coumadin.

## 2024-08-26 NOTE — TELEPHONE ENCOUNTER
Patient called and left a message that she came down with Covid and she got really bad on Friday and Saturday.  She state that she took Nyquil and Airborne tablets.  Please call the patient with instructions,

## 2024-08-28 ENCOUNTER — OFFICE VISIT (OUTPATIENT)
Dept: UROLOGY | Age: 69
End: 2024-08-28
Payer: MEDICARE

## 2024-08-28 VITALS — WEIGHT: 202 LBS | RESPIRATION RATE: 18 BRPM | HEIGHT: 67 IN | BODY MASS INDEX: 31.71 KG/M2

## 2024-08-28 DIAGNOSIS — N28.1 RENAL CYST: ICD-10-CM

## 2024-08-28 DIAGNOSIS — D17.71 ANGIOMYOLIPOMA OF RIGHT KIDNEY: Primary | ICD-10-CM

## 2024-08-28 DIAGNOSIS — N20.0 NEPHROLITHIASIS: ICD-10-CM

## 2024-08-28 PROCEDURE — G8399 PT W/DXA RESULTS DOCUMENT: HCPCS

## 2024-08-28 PROCEDURE — 1090F PRES/ABSN URINE INCON ASSESS: CPT

## 2024-08-28 PROCEDURE — 99214 OFFICE O/P EST MOD 30 MIN: CPT

## 2024-08-28 PROCEDURE — 3017F COLORECTAL CA SCREEN DOC REV: CPT

## 2024-08-28 PROCEDURE — G8427 DOCREV CUR MEDS BY ELIG CLIN: HCPCS

## 2024-08-28 PROCEDURE — G8417 CALC BMI ABV UP PARAM F/U: HCPCS

## 2024-08-28 PROCEDURE — 1123F ACP DISCUSS/DSCN MKR DOCD: CPT

## 2024-08-28 PROCEDURE — 1036F TOBACCO NON-USER: CPT

## 2024-08-28 RX ORDER — MOXIFLOXACIN 5 MG/ML
1 SOLUTION/ DROPS OPHTHALMIC
COMMUNITY
Start: 2024-08-20

## 2024-08-28 NOTE — PROGRESS NOTES
[facility] [place] [facility]  [facility] UROLOGY  [address]  [address]  [facility] [address]  Dept: [phone]  Loc: [phone]  Visit Date: 8/28/2024      HPI:   Sofya Lemos is a 68 y.o. female with past medical history as listed below who presents today for angiomyolipoma surveillance.    1.5 cm \"AML\" in the right upper pole of the kidney. Growing since last visit... 6 mm on CT in May 2023.    Definitive left-sided kidney stone treatment in July 2023. Patient has done well since that. Doing well with conservative stone prevention.      Current Outpatient Medications   Medication Sig Dispense Refill    moxifloxacin (VIGAMOX) 0.5 % ophthalmic solution Place 1 drop into the left eye      warfarin (COUMADIN) 5 MG tablet Take by mouth      warfarin (COUMADIN) 7.5 MG tablet Take by mouth      valACYclovir (VALTREX) 500 MG tablet Take 1 tablet by mouth 2 times daily as needed (for fever blisters)      Probiotic Product (PROBIOTIC DAILY PO) Take by mouth daily      citalopram (CELEXA) 40 MG tablet Take 1 tablet by mouth daily 90 tablet 3    levothyroxine (SYNTHROID) 50 MCG tablet Take 1 tablet by mouth daily 90 tablet 3    valsartan-hydroCHLOROthiazide (DIOVAN-HCT) 160-12.5 MG per tablet Take 1 tablet by mouth daily 90 tablet 3    buPROPion (WELLBUTRIN XL) 150 MG extended release tablet Take 1 tablet by mouth daily      Amoxicillin (AMOXIL PO) Take by mouth For 9/10/24 dental visit. (Patient not taking: Reported on 8/21/2024)      NONFORMULARY Apply topically See Admin Instructions Penicillin ointment from [place] (Patient not taking: Reported on 8/21/2024)       No current facility-administered medications for this visit.       Past Medical History  Sofya  has a past medical history of Arthritis of left knee, Hiatal hernia, History of blood transfusion, History of kidney stones, Hypertension, Hypothyroidism, Mastoiditis, Medication reaction, Multinodular goiter, Personal history of    Component Value Date/Time    BUN 30 03/22/2024 10:21 PM    CREATININE 0.7 03/27/2024 11:23 AM    CREATININE 0.9 03/22/2024 10:21 PM            Assessment & Plan:   Left Nephrolithiasis  - No new stone formation noted on US. No recent stone passage.  - Encouraged to continue with hydration goal of >80 oz water daily. Patient has done a well since cutting down on soda intake.  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 Never started her potassium citrate 10 mEq TID. Would prefer to hold off on this.  - KUB annually for kidney stone surveillance.    2.  Right Angiomyolipoma  3.  Right Renal Cyst  - 1.5 cm AML increased from 0.6 cm 1 year ago. Needs further characterization.  - Ordering MRI Abdomen W WO Contrast to more clearly define.    *Follow-up in 3-4 months with MRI Abdomen W WO Contrast prior.    Dinesh Lomeli PA-C  Urology

## 2024-09-03 ENCOUNTER — APPOINTMENT (OUTPATIENT)
Age: 69
End: 2024-09-03
Payer: MEDICARE

## 2024-09-03 ENCOUNTER — ANTI-COAG VISIT (OUTPATIENT)
Age: 69
End: 2024-09-03
Payer: MEDICARE

## 2024-09-03 DIAGNOSIS — Z86.718 HISTORY OF DVT (DEEP VEIN THROMBOSIS): Primary | ICD-10-CM

## 2024-09-03 DIAGNOSIS — Z79.01 ANTICOAGULATED ON COUMADIN: ICD-10-CM

## 2024-09-03 DIAGNOSIS — Z51.81 ENCOUNTER FOR THERAPEUTIC DRUG LEVEL MONITORING: ICD-10-CM

## 2024-09-03 LAB — POC INR: 3.2 (ref 0.8–1.2)

## 2024-09-03 PROCEDURE — 85610 PROTHROMBIN TIME: CPT | Performed by: PHARMACIST

## 2024-09-03 PROCEDURE — 99211 OFF/OP EST MAY X REQ PHY/QHP: CPT | Performed by: PHARMACIST

## 2024-09-03 NOTE — PROGRESS NOTES
Medication Management Clinic  [facility]  Anticoagulation Clinic  [phone] (phone)  [phone] (fax)    Ms. Sofya Lemos is a 69 y.o.  female with history of DVT who presents today for anticoagulation monitoring and adjustment.    Patient verifies current dosing regimen and tablet strength.  No missed or extra doses.  Patient denies s/s bleeding/swelling/SOB Bruises on left side on ribs where she fell.   No blood in urine or stool.  No dietary changes.   No changes in medication/Herbals. She did take some Nyquil last week but called into our clinic to let us know.   No change in alcohol use or tobacco use.  No change in activity level.  Sofya fell and landed on a sweeper about a week ago around 8/27. Didn't hit head. Ribs on her left side are bruised and tender to touch. Bruises are improving.  No breathing issues. Instructed to follow up with Urgent care or PCP to evaluate.   No vomiting/diarrhea or acute illness. Patient had COVID 8/23. She is feeling much better. She does have increased stress in her personal life currently.  Procedures scheduled in the future at this time.  Cataract surgery last Tuesday went well having the other eye done today. Scheduled for a doppler in 2 weeks. Dentist appointment on 9/10 will be taking amoxicillin 2000mg 1 hour prior to procedure. She is also has a MRI of the kidney on 11/25.     Assessment:   Lab Results   Component Value Date    INR 3.20 (H) 09/03/2024    INR 1.70 (H) 08/21/2024    INR 1.40 (H) 08/15/2024     INR supratherapeutic   Recent Labs     09/03/24  1028   INR 3.20*   1st supratherapeutic INR following dose increases on 8/21, 8/15 and 8/8.    Plan:  Coumadin 3.75mg x1 then continue Coumadin 11.25mg MF 7.5mg StuWThS.  Recheck INR in 2 week(s).  Patient reminded to call the Anticoagulation Clinic with any signs or symptoms of bleeding or with any medication changes.  Patient given instructions utilizing the teach back method.    After visit

## 2024-09-16 ENCOUNTER — TELEPHONE (OUTPATIENT)
Dept: UROLOGY | Age: 69
End: 2024-09-16

## 2024-09-17 ENCOUNTER — ANTI-COAG VISIT (OUTPATIENT)
Age: 69
End: 2024-09-17
Payer: MEDICARE

## 2024-09-17 ENCOUNTER — HOSPITAL ENCOUNTER (OUTPATIENT)
Dept: INTERVENTIONAL RADIOLOGY/VASCULAR | Age: 69
Discharge: HOME | End: 2024-09-19
Payer: MEDICARE

## 2024-09-17 ENCOUNTER — HOSPITAL ENCOUNTER (OUTPATIENT)
Age: 69
Discharge: HOME | End: 2024-09-17
Payer: MEDICARE

## 2024-09-17 DIAGNOSIS — Z79.01 ANTICOAGULATED ON COUMADIN: ICD-10-CM

## 2024-09-17 DIAGNOSIS — Z86.718 HISTORY OF DVT (DEEP VEIN THROMBOSIS): Primary | ICD-10-CM

## 2024-09-17 DIAGNOSIS — R73.9 HYPERGLYCEMIA: ICD-10-CM

## 2024-09-17 DIAGNOSIS — Z87.19 PERSONAL HISTORY OF OTHER DISEASES OF THE DIGESTIVE SYSTEM: ICD-10-CM

## 2024-09-17 DIAGNOSIS — D50.9 IRON DEFICIENCY ANEMIA, UNSPECIFIED IRON DEFICIENCY ANEMIA TYPE: ICD-10-CM

## 2024-09-17 DIAGNOSIS — Z86.718 PERSONAL HISTORY OF VENOUS THROMBOSIS AND EMBOLISM: ICD-10-CM

## 2024-09-17 DIAGNOSIS — R79.89 ELEVATED CORTISOL LEVEL: ICD-10-CM

## 2024-09-17 DIAGNOSIS — Z51.81 ENCOUNTER FOR THERAPEUTIC DRUG LEVEL MONITORING: ICD-10-CM

## 2024-09-17 LAB
BASOPHILS ABSOLUTE: 0 THOU/MM3 (ref 0–0.1)
BASOPHILS NFR BLD AUTO: 0.7 %
CORTIS SERPL-MCNC: 4.03 UG/DL
CORTISOL COLLECTION INFO: NORMAL
DEPRECATED MEAN GLUCOSE BLD GHB EST-ACNC: 117 MG/DL (ref 70–126)
DEPRECATED RDW RBC AUTO: 45.1 FL (ref 35–45)
EOSINOPHIL NFR BLD AUTO: 2.6 %
EOSINOPHILS ABSOLUTE: 0.1 THOU/MM3 (ref 0–0.4)
ERYTHROCYTE [DISTWIDTH] IN BLOOD BY AUTOMATED COUNT: 13.3 % (ref 11.5–14.5)
FERRITIN SERPL IA-MCNC: 55 NG/ML (ref 10–291)
HBA1C MFR BLD HPLC: 5.9 % (ref 4.4–6.4)
HCT VFR BLD AUTO: 36.8 % (ref 37–47)
HGB BLD-MCNC: 11.5 GM/DL (ref 12–16)
IMM GRANULOCYTES # BLD AUTO: 0.02 THOU/MM3 (ref 0–0.07)
IMM GRANULOCYTES NFR BLD AUTO: 0.4 %
INR PPP: 3.73 (ref 0.85–1.13)
IRON SATN MFR SERPL: 19 % (ref 20–50)
IRON SERPL-MCNC: 51 UG/DL (ref 50–170)
LYMPHOCYTES ABSOLUTE: 1.2 THOU/MM3 (ref 1–4.8)
LYMPHOCYTES NFR BLD AUTO: 21.6 %
MCH RBC QN AUTO: 28.9 PG (ref 26–33)
MCHC RBC AUTO-ENTMCNC: 31.3 GM/DL (ref 32.2–35.5)
MCV RBC AUTO: 92.5 FL (ref 81–99)
MONOCYTES ABSOLUTE: 0.4 THOU/MM3 (ref 0.4–1.3)
MONOCYTES NFR BLD AUTO: 6.2 %
NEUTROPHILS ABSOLUTE: 3.9 THOU/MM3 (ref 1.8–7.7)
NEUTROPHILS NFR BLD AUTO: 68.5 %
NRBC BLD AUTO-RTO: 0 /100 WBC
PLATELET # BLD AUTO: 206 THOU/MM3 (ref 130–400)
PMV BLD AUTO: 10.3 FL (ref 9.4–12.4)
POC INR: 3.8 (ref 0.8–1.2)
RBC # BLD AUTO: 3.98 MILL/MM3 (ref 4.2–5.4)
TIBC SERPL-MCNC: 274 UG/DL (ref 171–450)
WBC # BLD AUTO: 5.7 THOU/MM3 (ref 4.8–10.8)

## 2024-09-17 PROCEDURE — 83036 HEMOGLOBIN GLYCOSYLATED A1C: CPT

## 2024-09-17 PROCEDURE — 93971 EXTREMITY STUDY: CPT

## 2024-09-17 PROCEDURE — 83540 ASSAY OF IRON: CPT

## 2024-09-17 PROCEDURE — 82728 ASSAY OF FERRITIN: CPT

## 2024-09-17 PROCEDURE — 85025 COMPLETE CBC W/AUTO DIFF WBC: CPT

## 2024-09-17 PROCEDURE — 36415 COLL VENOUS BLD VENIPUNCTURE: CPT

## 2024-09-17 PROCEDURE — 85610 PROTHROMBIN TIME: CPT

## 2024-09-17 PROCEDURE — 83550 IRON BINDING TEST: CPT

## 2024-09-17 PROCEDURE — 82533 TOTAL CORTISOL: CPT

## 2024-09-17 PROCEDURE — 99213 OFFICE O/P EST LOW 20 MIN: CPT

## 2024-09-17 RX ORDER — PREDNISOLONE ACETATE 10 MG/ML
SUSPENSION/ DROPS OPHTHALMIC
COMMUNITY

## 2024-09-17 RX ORDER — WARFARIN SODIUM 7.5 MG/1
TABLET ORAL
Qty: 90 TABLET | Refills: 1 | Status: SHIPPED | OUTPATIENT
Start: 2024-09-17

## 2024-09-21 ENCOUNTER — HOSPITAL ENCOUNTER (OUTPATIENT)
Age: 69
Discharge: HOME | End: 2024-09-21
Payer: MEDICARE

## 2024-09-21 DIAGNOSIS — R19.4 BOWEL HABIT CHANGES: ICD-10-CM

## 2024-09-21 DIAGNOSIS — R19.5 LOOSE STOOLS: ICD-10-CM

## 2024-09-21 LAB
C CAYETANENSIS DNA SPEC QL NAA+PROBE: NOT DETECTED
CAMPY SP DNA.DIARRHEA STL QL NAA+PROBE: NOT DETECTED
CRYPTOSP DNA SPEC QL NAA+PROBE: NOT DETECTED
E COLI O157H7 DNA SPEC QL NAA+PROBE: NORMAL
E HISTOLYT DNA SPEC QL NAA+PROBE: NOT DETECTED
EAEC PAA PLAS AGGR+AATA ST NAA+NON-PRB: NOT DETECTED
EC STX1+STX2 + H7 FLIC SPEC NAA+PROBE: NOT DETECTED
EPEC EAE GENE STL QL NAA+NON-PROBE: NOT DETECTED
ETEC LTA+ST1A+ST1B TOX ST NAA+NON-PROBE: NOT DETECTED
G LAMBLIA DNA SPEC QL NAA+PROBE: NOT DETECTED
HADV DNA SPEC QL NAA+PROBE: NOT DETECTED
HASTV RNA SPEC QL NAA+PROBE: NOT DETECTED
NOROVIRUS GI + GII RNA STL NAA+PROBE: NOT DETECTED
P SHIGELLOIDES DNA STL QL NAA+PROBE: NOT DETECTED
RV RNA SPEC QL NAA+PROBE: NOT DETECTED
SALMONELLA DNA SPEC QL NAA+PROBE: NOT DETECTED
SAPOVIRUS RNA SPEC QL NAA+PROBE: NOT DETECTED
SHIGELLA SP+EIEC IPAH ST NAA+NON-PROBE: NOT DETECTED
V CHOLERAE DNA SPEC QL NAA+PROBE: NOT DETECTED
VIBRIO DNA SPEC NAA+PROBE: NOT DETECTED
Y ENTERO RECN STL QL NAA+PROBE: NOT DETECTED

## 2024-09-21 PROCEDURE — 87507 IADNA-DNA/RNA PROBE TQ 12-25: CPT

## 2024-09-24 ENCOUNTER — APPOINTMENT (OUTPATIENT)
Dept: GENERAL RADIOLOGY | Age: 69
End: 2024-09-24
Payer: MEDICARE

## 2024-09-24 ENCOUNTER — HOSPITAL ENCOUNTER (EMERGENCY)
Age: 69
Discharge: HOME | End: 2024-09-24
Payer: MEDICARE

## 2024-09-24 ENCOUNTER — ANTI-COAG VISIT (OUTPATIENT)
Age: 69
End: 2024-09-24
Payer: MEDICARE

## 2024-09-24 VITALS
SYSTOLIC BLOOD PRESSURE: 132 MMHG | RESPIRATION RATE: 18 BRPM | HEIGHT: 67 IN | HEART RATE: 94 BPM | OXYGEN SATURATION: 96 % | DIASTOLIC BLOOD PRESSURE: 62 MMHG | BODY MASS INDEX: 32.18 KG/M2 | WEIGHT: 205 LBS | TEMPERATURE: 98 F

## 2024-09-24 DIAGNOSIS — Z86.718 HISTORY OF DVT (DEEP VEIN THROMBOSIS): Primary | ICD-10-CM

## 2024-09-24 DIAGNOSIS — Z51.81 ENCOUNTER FOR THERAPEUTIC DRUG LEVEL MONITORING: ICD-10-CM

## 2024-09-24 DIAGNOSIS — S93.402A SPRAIN OF LEFT ANKLE, UNSPECIFIED LIGAMENT, INITIAL ENCOUNTER: Primary | ICD-10-CM

## 2024-09-24 DIAGNOSIS — Z79.01 ANTICOAGULATED ON COUMADIN: ICD-10-CM

## 2024-09-24 LAB — POC INR: 2.5 (ref 0.8–1.2)

## 2024-09-24 PROCEDURE — 99211 OFF/OP EST MAY X REQ PHY/QHP: CPT

## 2024-09-24 PROCEDURE — 99213 OFFICE O/P EST LOW 20 MIN: CPT

## 2024-09-24 PROCEDURE — 73610 X-RAY EXAM OF ANKLE: CPT

## 2024-09-24 PROCEDURE — 99213 OFFICE O/P EST LOW 20 MIN: CPT | Performed by: NURSE PRACTITIONER

## 2024-09-24 PROCEDURE — 85610 PROTHROMBIN TIME: CPT

## 2024-09-24 ASSESSMENT — ENCOUNTER SYMPTOMS
NAUSEA: 0
DIARRHEA: 0
SHORTNESS OF BREATH: 0
VOMITING: 0
RHINORRHEA: 0
COUGH: 0
CHEST TIGHTNESS: 0
SORE THROAT: 0

## 2024-09-24 ASSESSMENT — PAIN DESCRIPTION - LOCATION: LOCATION: ANKLE

## 2024-09-24 ASSESSMENT — PAIN DESCRIPTION - DESCRIPTORS: DESCRIPTORS: ACHING;STABBING

## 2024-09-24 ASSESSMENT — PAIN - FUNCTIONAL ASSESSMENT
PAIN_FUNCTIONAL_ASSESSMENT: 0-10
PAIN_FUNCTIONAL_ASSESSMENT: PREVENTS OR INTERFERES SOME ACTIVE ACTIVITIES AND ADLS

## 2024-09-24 ASSESSMENT — PAIN DESCRIPTION - ORIENTATION: ORIENTATION: LEFT

## 2024-09-24 ASSESSMENT — PAIN DESCRIPTION - FREQUENCY: FREQUENCY: CONTINUOUS

## 2024-09-24 ASSESSMENT — PAIN DESCRIPTION - PAIN TYPE: TYPE: ACUTE PAIN

## 2024-09-24 ASSESSMENT — PAIN DESCRIPTION - ONSET: ONSET: SUDDEN

## 2024-09-24 ASSESSMENT — PAIN SCALES - GENERAL: PAINLEVEL_OUTOF10: 7

## 2024-09-24 NOTE — ED PROVIDER NOTES
[facility]  Urgent Care Encounter       CHIEF COMPLAINT       Chief Complaint   Patient presents with    Ankle Injury     Missed a step about 10 days ago  and twisted left ankle       Nurses Notes reviewed and I agree except as noted in the HPI.  HISTORY OF PRESENT ILLNESS   Sofya Lemos is a 69 y.o. female who presents to the [facility] for evaluation of a left ankle injury.  She reports that she missed a step and twisted her left ankle roughly 10 days ago.  She reports continued pain with edema.  She reports a previous tendon surgery.    The history is provided by the patient. No  was used.       REVIEW OF SYSTEMS     Review of Systems   Constitutional:  Negative for activity change, appetite change, chills, fatigue and fever.   HENT:  Negative for ear discharge, ear pain, rhinorrhea and sore throat.    Respiratory:  Negative for cough, chest tightness and shortness of breath.    Cardiovascular:  Negative for chest pain.   Gastrointestinal:  Negative for diarrhea, nausea and vomiting.   Genitourinary:  Negative for dysuria.   Musculoskeletal:  Positive for arthralgias.   Skin:  Negative for rash.   Allergic/Immunologic: Negative for environmental allergies and food allergies.   Neurological:  Negative for dizziness and headaches.       PAST MEDICAL HISTORY         Diagnosis Date    Arthritis of left knee 02/08/2022    Hiatal hernia     History of blood transfusion 08/2022    low Iron    History of kidney stones     Hypertension     Hypothyroidism 09/09/2013    Mastoiditis 12/30/2012    Medication reaction 05/20/2013    Multinodular goiter 02/17/2016    Personal history of COVID-19 09/20/2022    Renal cyst 02/28/2022    Subjective tinnitus 11/24/2012       SURGICALHISTORY     Patient  has a past surgical history that includes Tonsillectomy; Hysterectomy (2009); Thyroid surgery (01/05/2011); Ankle arthroscopy (Left, 05/15/2017); Endoscopy, colon,

## 2024-09-25 ENCOUNTER — TELEPHONE (OUTPATIENT)
Age: 69
End: 2024-09-25

## 2024-09-30 ENCOUNTER — APPOINTMENT (OUTPATIENT)
Age: 69
End: 2024-09-30
Payer: MEDICARE

## 2024-10-18 ENCOUNTER — HOSPITAL ENCOUNTER (OUTPATIENT)
Age: 69
Discharge: HOME | End: 2024-10-18
Payer: MEDICARE

## 2024-10-18 PROCEDURE — 87449 NOS EACH ORGANISM AG IA: CPT

## 2024-10-18 PROCEDURE — 82705 FATS/LIPIDS FECES QUAL: CPT

## 2024-10-18 PROCEDURE — 83520 IMMUNOASSAY QUANT NOS NONAB: CPT

## 2024-10-18 PROCEDURE — 83993 ASSAY FOR CALPROTECTIN FECAL: CPT

## 2024-10-19 LAB — C DIFF GDH STL QL: NEGATIVE

## 2024-10-21 LAB — FECAL FAT, QUALITATIVE: NORMAL

## 2024-10-22 LAB
CALPROTECTIN STL-MCNT: 21 UG/G
ELASTASE PANC STL-MCNT: 369 UG/G

## 2024-10-29 ENCOUNTER — HOSPITAL ENCOUNTER (OUTPATIENT)
Age: 69
Discharge: HOME | End: 2024-10-29
Payer: MEDICARE

## 2024-10-29 LAB
BASOPHILS ABSOLUTE: 0 THOU/MM3 (ref 0–0.1)
BASOPHILS NFR BLD AUTO: 0.5 %
DEPRECATED RDW RBC AUTO: 48.2 FL (ref 35–45)
EOSINOPHIL NFR BLD AUTO: 2.9 %
EOSINOPHILS ABSOLUTE: 0.2 THOU/MM3 (ref 0–0.4)
ERYTHROCYTE [DISTWIDTH] IN BLOOD BY AUTOMATED COUNT: 14.2 % (ref 11.5–14.5)
HCT VFR BLD AUTO: 40.4 % (ref 37–47)
HGB BLD-MCNC: 12.7 GM/DL (ref 12–16)
IMM GRANULOCYTES # BLD AUTO: 0.01 THOU/MM3 (ref 0–0.07)
IMM GRANULOCYTES NFR BLD AUTO: 0.2 %
LYMPHOCYTES ABSOLUTE: 1.2 THOU/MM3 (ref 1–4.8)
LYMPHOCYTES NFR BLD AUTO: 20.5 %
MCH RBC QN AUTO: 29.3 PG (ref 26–33)
MCHC RBC AUTO-ENTMCNC: 31.4 GM/DL (ref 32.2–35.5)
MCV RBC AUTO: 93.3 FL (ref 81–99)
MONOCYTES ABSOLUTE: 0.6 THOU/MM3 (ref 0.4–1.3)
MONOCYTES NFR BLD AUTO: 9.5 %
NEUTROPHILS ABSOLUTE: 3.9 THOU/MM3 (ref 1.8–7.7)
NEUTROPHILS NFR BLD AUTO: 66.4 %
NRBC BLD AUTO-RTO: 0 /100 WBC
PLATELET # BLD AUTO: 205 THOU/MM3 (ref 130–400)
PMV BLD AUTO: 10.6 FL (ref 9.4–12.4)
RBC # BLD AUTO: 4.33 MILL/MM3 (ref 4.2–5.4)
WBC # BLD AUTO: 5.8 THOU/MM3 (ref 4.8–10.8)

## 2024-10-29 PROCEDURE — 82728 ASSAY OF FERRITIN: CPT

## 2024-10-29 PROCEDURE — 83540 ASSAY OF IRON: CPT

## 2024-10-29 PROCEDURE — 83550 IRON BINDING TEST: CPT

## 2024-10-29 PROCEDURE — 36415 COLL VENOUS BLD VENIPUNCTURE: CPT

## 2024-10-29 PROCEDURE — 85025 COMPLETE CBC W/AUTO DIFF WBC: CPT

## 2024-10-30 LAB
FERRITIN SERPL IA-MCNC: 593 NG/ML (ref 10–291)
IRON SATN MFR SERPL: 32 % (ref 20–50)
IRON SERPL-MCNC: 72 UG/DL (ref 50–170)
TIBC SERPL-MCNC: 224 UG/DL (ref 171–450)

## 2024-11-05 ENCOUNTER — HOSPITAL ENCOUNTER (OUTPATIENT)
Dept: INTERVENTIONAL RADIOLOGY/VASCULAR | Age: 69
Discharge: HOME | End: 2024-11-07
Attending: PODIATRIST
Payer: MEDICARE

## 2024-11-05 DIAGNOSIS — M79.605 LEFT LEG PAIN: ICD-10-CM

## 2024-11-05 DIAGNOSIS — Z86.718 PERSONAL HISTORY OF DVT (DEEP VEIN THROMBOSIS): ICD-10-CM

## 2024-11-05 PROCEDURE — 93971 EXTREMITY STUDY: CPT

## 2024-11-25 ENCOUNTER — HOSPITAL ENCOUNTER (OUTPATIENT)
Dept: MRI IMAGING | Age: 69
Discharge: HOME | End: 2024-11-25
Payer: MEDICARE

## 2024-11-25 DIAGNOSIS — D17.71 ANGIOMYOLIPOMA OF RIGHT KIDNEY: ICD-10-CM

## 2024-11-25 LAB — POC CREATININE WHOLE BLOOD: 1 MG/DL (ref 0.5–1.2)

## 2024-11-25 PROCEDURE — 74183 MRI ABD W/O CNTR FLWD CNTR: CPT

## 2024-11-25 PROCEDURE — 6360000004 HC RX CONTRAST MEDICATION

## 2024-11-25 PROCEDURE — 82565 ASSAY OF CREATININE: CPT

## 2024-11-25 PROCEDURE — A9579 GAD-BASE MR CONTRAST NOS,1ML: HCPCS

## 2024-11-25 RX ADMIN — GADOTERIDOL 20 ML: 279.3 INJECTION, SOLUTION INTRAVENOUS at 10:20

## 2024-11-27 ENCOUNTER — HOSPITAL ENCOUNTER (OUTPATIENT)
Dept: GENERAL RADIOLOGY | Age: 69
Discharge: HOME | End: 2024-11-27
Payer: MEDICARE

## 2024-11-27 ENCOUNTER — HOSPITAL ENCOUNTER (OUTPATIENT)
Dept: ULTRASOUND IMAGING | Age: 69
Discharge: HOME | End: 2024-11-27
Payer: MEDICARE

## 2024-11-27 ENCOUNTER — HOSPITAL ENCOUNTER (OUTPATIENT)
Dept: MRI IMAGING | Age: 69
Discharge: HOME | End: 2024-11-27
Attending: PODIATRIST
Payer: MEDICARE

## 2024-11-27 DIAGNOSIS — R19.5 GREEN STOOL: ICD-10-CM

## 2024-11-27 DIAGNOSIS — S93.402D SPRAIN OF LIGAMENT OF LEFT ANKLE, SUBSEQUENT ENCOUNTER: ICD-10-CM

## 2024-11-27 DIAGNOSIS — R19.4 CHANGE IN BOWEL HABITS: ICD-10-CM

## 2024-11-27 DIAGNOSIS — Z86.718 HISTORY OF THROMBOEMBOLISM: ICD-10-CM

## 2024-11-27 DIAGNOSIS — R10.32 ABDOMINAL PAIN, LEFT LOWER QUADRANT: ICD-10-CM

## 2024-11-27 DIAGNOSIS — M79.605 LEFT LEG PAIN: ICD-10-CM

## 2024-11-27 DIAGNOSIS — R14.0 BLOATING: ICD-10-CM

## 2024-11-27 PROCEDURE — 74018 RADEX ABDOMEN 1 VIEW: CPT

## 2024-11-27 PROCEDURE — 76705 ECHO EXAM OF ABDOMEN: CPT

## 2024-11-27 PROCEDURE — 73721 MRI JNT OF LWR EXTRE W/O DYE: CPT

## 2024-12-04 ENCOUNTER — OFFICE VISIT (OUTPATIENT)
Dept: UROLOGY | Age: 69
End: 2024-12-04
Payer: MEDICARE

## 2024-12-04 VITALS — HEIGHT: 67 IN | WEIGHT: 205 LBS | BODY MASS INDEX: 32.18 KG/M2 | RESPIRATION RATE: 18 BRPM

## 2024-12-04 DIAGNOSIS — N28.9 LESION OF RIGHT NATIVE KIDNEY: ICD-10-CM

## 2024-12-04 DIAGNOSIS — N28.1 BILATERAL RENAL CYSTS: Primary | ICD-10-CM

## 2024-12-04 DIAGNOSIS — Z87.442 PERSONAL HISTORY OF KIDNEY STONES: ICD-10-CM

## 2024-12-04 PROCEDURE — G8484 FLU IMMUNIZE NO ADMIN: HCPCS

## 2024-12-04 PROCEDURE — G8417 CALC BMI ABV UP PARAM F/U: HCPCS

## 2024-12-04 PROCEDURE — 3017F COLORECTAL CA SCREEN DOC REV: CPT

## 2024-12-04 PROCEDURE — 1090F PRES/ABSN URINE INCON ASSESS: CPT

## 2024-12-04 PROCEDURE — G8427 DOCREV CUR MEDS BY ELIG CLIN: HCPCS

## 2024-12-04 PROCEDURE — 1123F ACP DISCUSS/DSCN MKR DOCD: CPT

## 2024-12-04 PROCEDURE — G8399 PT W/DXA RESULTS DOCUMENT: HCPCS

## 2024-12-04 PROCEDURE — 1159F MED LIST DOCD IN RCRD: CPT

## 2024-12-04 PROCEDURE — 1036F TOBACCO NON-USER: CPT

## 2024-12-04 PROCEDURE — 99214 OFFICE O/P EST MOD 30 MIN: CPT

## 2024-12-04 NOTE — PROGRESS NOTES
[facility] [place] [facility]  [facility] UROLOGY  [address]  [address]  [facility] [address]  Dept: [phone]  Loc: [phone]  Visit Date: 12/4/2024      HPI:   Sofya Lemos is a 69 y.o. female with past medical history as listed below who presents today for angiomyolipoma surveillance.    1.5 cm \"AML\" in the right upper pole of the kidney... not observed on MRI ABDOMEN W WO (11/25/24), but rather an 8 x 6 mm upper pole lesions grading as Bosniak 2F as well as additional Bosniak I cysts in bilateral kidneys.    Definitive left-sided kidney stone treatment in July 2023. Patient has done well since that. Doing well with conservative stone prevention. No new stone formation noted on surveillance KUB.      I independently reviewed images and their accompanying reports from the following studies:    MRI ABDOMEN W WO CONTRAST  Result Date: 11/25/24    FINDINGS:  LOWER THORAX: No significant lower thoracic findings.     HEPATOBILIARY: Mild hepatomegaly. Marked loss of signal on unopposed phased  imaging. Moderate loss of signal on opposed phase imaging. A 9 mm nonenhancing  cyst is seen in segment 2 of the liver. Unremarkable hepatic surface morphology.  The gallbladder, and biliary tree are unremarkable.     PANCREAS AND SPLEEN: Moderate atrophy of the pancreas. No peripancreatic  abnormality.  The spleen is not enlarged     RETROPERITONEUM: Multiple nonenhancing cystic lesions are seen in the both  kidneys. A 6 x 8 mm T1 hypointense and T2 hypointense lesion is without  enhancement in the superior pole of the right kidney. The adrenal glands are not  enlarged     BOWEL AND PERITONEUM:  No bowel obstruction or acute inflammatory bowel process.     VASCULAR: The abdominal aorta is not aneurysmal.  The inferior vena cava is  patent.  The main portal vein, superior mesenteric vein, and splenic vein are  patent.       LYMPH NODES: No significantly enlarged lymph nodes are seen.

## 2024-12-13 ENCOUNTER — HOSPITAL ENCOUNTER (OUTPATIENT)
Dept: MAMMOGRAPHY | Age: 69
Discharge: HOME | End: 2024-12-13
Payer: MEDICARE

## 2024-12-13 DIAGNOSIS — Z12.31 ENCOUNTER FOR SCREENING MAMMOGRAM FOR MALIGNANT NEOPLASM OF BREAST: ICD-10-CM

## 2024-12-13 PROCEDURE — 77063 BREAST TOMOSYNTHESIS BI: CPT

## 2025-02-10 ENCOUNTER — HOSPITAL ENCOUNTER (OUTPATIENT)
Dept: NUCLEAR MEDICINE | Age: 70
Discharge: HOME | End: 2025-02-10
Payer: MEDICARE

## 2025-02-10 DIAGNOSIS — R14.0 BLOATING: ICD-10-CM

## 2025-02-10 DIAGNOSIS — R14.3 FLATUS: ICD-10-CM

## 2025-02-10 DIAGNOSIS — K90.49 FAT MALABSORPTION: ICD-10-CM

## 2025-02-10 PROCEDURE — 3430000000 HC RX DIAGNOSTIC RADIOPHARMACEUTICAL: Performed by: NURSE PRACTITIONER

## 2025-02-10 PROCEDURE — A9537 TC99M MEBROFENIN: HCPCS | Performed by: NURSE PRACTITIONER

## 2025-02-10 PROCEDURE — 78227 HEPATOBIL SYST IMAGE W/DRUG: CPT

## 2025-02-10 RX ADMIN — Medication 8.6 MILLICURIE: at 09:55

## 2025-02-19 ENCOUNTER — HOSPITAL ENCOUNTER (OUTPATIENT)
Age: 70
Discharge: HOME | End: 2025-02-19
Payer: MEDICARE

## 2025-02-19 LAB
ALBUMIN SERPL BCG-MCNC: 4.5 G/DL (ref 3.5–5.1)
ALP SERPL-CCNC: 91 U/L (ref 38–126)
ALT SERPL W/O P-5'-P-CCNC: 7 U/L (ref 11–66)
ANION GAP SERPL CALC-SCNC: 17 MEQ/L (ref 8–16)
AST SERPL-CCNC: 12 U/L (ref 5–40)
BASOPHILS ABSOLUTE: 0 THOU/MM3 (ref 0–0.1)
BASOPHILS NFR BLD AUTO: 0.5 %
BILIRUB SERPL-MCNC: 0.4 MG/DL (ref 0.3–1.2)
BUN SERPL-MCNC: 21 MG/DL (ref 7–22)
CALCIUM SERPL-MCNC: 9.8 MG/DL (ref 8.2–9.6)
CHLORIDE SERPL-SCNC: 102 MEQ/L (ref 98–111)
CO2 SERPL-SCNC: 26 MEQ/L (ref 23–33)
CREAT SERPL-MCNC: 0.8 MG/DL (ref 0.4–1.2)
DEPRECATED RDW RBC AUTO: 43.8 FL (ref 35–45)
EOSINOPHIL NFR BLD AUTO: 2.9 %
EOSINOPHILS ABSOLUTE: 0.2 THOU/MM3 (ref 0–0.4)
ERYTHROCYTE [DISTWIDTH] IN BLOOD BY AUTOMATED COUNT: 12.6 % (ref 11.5–14.5)
FERRITIN SERPL IA-MCNC: 517 NG/ML (ref 13–150)
GFR SERPL CREATININE-BSD FRML MDRD: 80 ML/MIN/1.73M2
GLUCOSE SERPL-MCNC: 99 MG/DL (ref 70–108)
HCT VFR BLD AUTO: 41.4 % (ref 37–47)
HGB BLD-MCNC: 13.3 GM/DL (ref 12–16)
IMM GRANULOCYTES # BLD AUTO: 0.01 THOU/MM3 (ref 0–0.07)
IMM GRANULOCYTES NFR BLD AUTO: 0.2 %
IRON SATN MFR SERPL: 32 % (ref 20–50)
IRON SERPL-MCNC: 76 UG/DL (ref 50–170)
LYMPHOCYTES ABSOLUTE: 0.8 THOU/MM3 (ref 1–4.8)
LYMPHOCYTES NFR BLD AUTO: 14.4 %
MCH RBC QN AUTO: 30.5 PG (ref 26–33)
MCHC RBC AUTO-ENTMCNC: 32.1 GM/DL (ref 32.2–35.5)
MCV RBC AUTO: 95 FL (ref 81–99)
MONOCYTES ABSOLUTE: 0.4 THOU/MM3 (ref 0.4–1.3)
MONOCYTES NFR BLD AUTO: 7.7 %
NEUTROPHILS ABSOLUTE: 4.1 THOU/MM3 (ref 1.8–7.7)
NEUTROPHILS NFR BLD AUTO: 74.3 %
NRBC BLD AUTO-RTO: 0 /100 WBC
PLATELET # BLD AUTO: 218 THOU/MM3 (ref 130–400)
PMV BLD AUTO: 10.1 FL (ref 9.4–12.4)
POTASSIUM SERPL-SCNC: 5.1 MEQ/L (ref 3.5–5.2)
PROT SERPL-MCNC: 6.7 G/DL (ref 6.1–8)
RBC # BLD AUTO: 4.36 MILL/MM3 (ref 4.2–5.4)
SODIUM SERPL-SCNC: 145 MEQ/L (ref 135–145)
TIBC SERPL-MCNC: 234 UG/DL (ref 171–450)
WBC # BLD AUTO: 5.5 THOU/MM3 (ref 4.8–10.8)

## 2025-02-19 PROCEDURE — 80053 COMPREHEN METABOLIC PANEL: CPT

## 2025-02-19 PROCEDURE — 36415 COLL VENOUS BLD VENIPUNCTURE: CPT

## 2025-02-19 PROCEDURE — 83540 ASSAY OF IRON: CPT

## 2025-02-19 PROCEDURE — 83550 IRON BINDING TEST: CPT

## 2025-02-19 PROCEDURE — 85025 COMPLETE CBC W/AUTO DIFF WBC: CPT

## 2025-02-19 PROCEDURE — 82728 ASSAY OF FERRITIN: CPT

## 2025-03-21 ENCOUNTER — TRANSCRIBE ORDERS (OUTPATIENT)
Dept: ADMINISTRATIVE | Age: 70
End: 2025-03-21

## 2025-03-21 DIAGNOSIS — R19.4 CHANGE IN BOWEL HABITS: ICD-10-CM

## 2025-03-21 DIAGNOSIS — R14.0 BLOATING: ICD-10-CM

## 2025-03-21 DIAGNOSIS — R14.3 FLATUS: ICD-10-CM

## 2025-03-21 DIAGNOSIS — K90.49 FAT MALABSORPTION: Primary | ICD-10-CM

## 2025-04-03 ENCOUNTER — TELEPHONE (OUTPATIENT)
Dept: UROLOGY | Age: 70
End: 2025-04-03

## 2025-04-03 NOTE — TELEPHONE ENCOUNTER
Patient scheduled for MRI  at [facility] on June 4th .  Arrival of 8am for a 8;30 scan time.  Order mailed with instructions

## 2025-05-09 ENCOUNTER — HOSPITAL ENCOUNTER (OUTPATIENT)
Dept: CT IMAGING | Age: 70
Discharge: HOME | End: 2025-05-09
Payer: MEDICARE

## 2025-05-09 DIAGNOSIS — R14.3 FLATUS: ICD-10-CM

## 2025-05-09 DIAGNOSIS — R19.4 CHANGE IN BOWEL HABITS: ICD-10-CM

## 2025-05-09 DIAGNOSIS — K90.49 FAT MALABSORPTION: ICD-10-CM

## 2025-05-09 DIAGNOSIS — R14.0 BLOATING: ICD-10-CM

## 2025-05-09 LAB
CREAT BLD-MCNC: 1 MG/DL (ref 0.5–1.2)
GFR SERPL CREATININE-BSD FRML MDRD: 61 ML/MIN/1.73M2

## 2025-05-09 PROCEDURE — 74170 CT ABD WO CNTRST FLWD CNTRST: CPT

## 2025-05-09 PROCEDURE — 82565 ASSAY OF CREATININE: CPT

## 2025-05-09 PROCEDURE — 6360000004 HC RX CONTRAST MEDICATION: Performed by: NURSE PRACTITIONER

## 2025-05-09 RX ORDER — IOPAMIDOL 755 MG/ML
85 INJECTION, SOLUTION INTRAVASCULAR
Status: COMPLETED | OUTPATIENT
Start: 2025-05-09 | End: 2025-05-09

## 2025-05-09 RX ADMIN — IOPAMIDOL 85 ML: 755 INJECTION, SOLUTION INTRAVENOUS at 09:12

## 2025-05-12 PROBLEM — Z51.81 ENCOUNTER FOR THERAPEUTIC DRUG LEVEL MONITORING: Status: RESOLVED | Noted: 2024-07-18 | Resolved: 2025-05-12

## 2025-05-12 PROBLEM — Z86.718 HISTORY OF DVT (DEEP VEIN THROMBOSIS): Status: RESOLVED | Noted: 2024-07-18 | Resolved: 2025-05-12

## 2025-05-12 PROBLEM — M76.829 TIBIALIS TENDINITIS: Status: RESOLVED | Noted: 2022-02-25 | Resolved: 2025-05-12

## 2025-05-12 PROBLEM — I82.409 POSTOPERATIVE ACUTE DEEP VEIN THROMBOSIS OF LOWER EXTREMITY (HCC): Status: RESOLVED | Noted: 2024-03-25 | Resolved: 2025-05-12

## 2025-05-12 PROBLEM — Z79.01 ANTICOAGULATED ON COUMADIN: Status: RESOLVED | Noted: 2024-07-18 | Resolved: 2025-05-12

## 2025-05-12 PROBLEM — T81.72XA POSTOPERATIVE ACUTE DEEP VEIN THROMBOSIS OF LOWER EXTREMITY (HCC): Status: RESOLVED | Noted: 2024-03-25 | Resolved: 2025-05-12

## 2025-06-04 ENCOUNTER — HOSPITAL ENCOUNTER (OUTPATIENT)
Dept: MRI IMAGING | Age: 70
Discharge: HOME | End: 2025-06-04
Payer: MEDICARE

## 2025-06-04 DIAGNOSIS — N28.9 LESION OF RIGHT NATIVE KIDNEY: ICD-10-CM

## 2025-06-04 DIAGNOSIS — N28.1 BILATERAL RENAL CYSTS: ICD-10-CM

## 2025-06-04 PROCEDURE — A9579 GAD-BASE MR CONTRAST NOS,1ML: HCPCS

## 2025-06-04 PROCEDURE — 6360000004 HC RX CONTRAST MEDICATION

## 2025-06-04 PROCEDURE — 74183 MRI ABD W/O CNTR FLWD CNTR: CPT

## 2025-06-04 RX ADMIN — GADOTERIDOL 15 ML: 279.3 INJECTION, SOLUTION INTRAVENOUS at 08:53

## 2025-06-23 ENCOUNTER — HOSPITAL ENCOUNTER (OUTPATIENT)
Dept: GENERAL RADIOLOGY | Age: 70
Discharge: HOME | End: 2025-06-23
Payer: MEDICARE

## 2025-06-23 ENCOUNTER — HOSPITAL ENCOUNTER (OUTPATIENT)
Age: 70
Discharge: HOME | End: 2025-06-23
Payer: MEDICARE

## 2025-06-23 DIAGNOSIS — S09.92XA INJURY OF NOSE, INITIAL ENCOUNTER: ICD-10-CM

## 2025-06-23 PROCEDURE — 70160 X-RAY EXAM OF NASAL BONES: CPT

## 2025-06-25 ENCOUNTER — OFFICE VISIT (OUTPATIENT)
Dept: UROLOGY | Age: 70
End: 2025-06-25
Payer: MEDICARE

## 2025-06-25 VITALS — RESPIRATION RATE: 20 BRPM | WEIGHT: 194 LBS | BODY MASS INDEX: 32.32 KG/M2 | HEIGHT: 65 IN

## 2025-06-25 DIAGNOSIS — N28.1 BILATERAL RENAL CYSTS: Primary | ICD-10-CM

## 2025-06-25 DIAGNOSIS — D17.71 ANGIOMYOLIPOMA OF RIGHT KIDNEY: ICD-10-CM

## 2025-06-25 DIAGNOSIS — Z87.442 PERSONAL HISTORY OF KIDNEY STONES: ICD-10-CM

## 2025-06-25 LAB
BILIRUBIN, URINE: NEGATIVE
BLOOD URINE, POC: NORMAL
CHARACTER, URINE: CLEAR
COLOR, UA: YELLOW
GLUCOSE URINE: NEGATIVE MG/DL
KETONES, URINE: NEGATIVE
LEUKOCYTE CLUMPS, URINE: NEGATIVE
NITRITE, URINE: NEGATIVE
PH, URINE: 5.5 (ref 5–9)
PROTEIN, URINE: NEGATIVE MG/DL
SPECIFIC GRAVITY UA: 1.02 (ref 1–1.03)
UROBILINOGEN, URINE: 0.2 EU/DL (ref 0–1)

## 2025-06-25 PROCEDURE — 1090F PRES/ABSN URINE INCON ASSESS: CPT

## 2025-06-25 PROCEDURE — 1036F TOBACCO NON-USER: CPT

## 2025-06-25 PROCEDURE — G8399 PT W/DXA RESULTS DOCUMENT: HCPCS

## 2025-06-25 PROCEDURE — 1123F ACP DISCUSS/DSCN MKR DOCD: CPT

## 2025-06-25 PROCEDURE — 99214 OFFICE O/P EST MOD 30 MIN: CPT

## 2025-06-25 PROCEDURE — 81003 URINALYSIS AUTO W/O SCOPE: CPT

## 2025-06-25 PROCEDURE — 1159F MED LIST DOCD IN RCRD: CPT

## 2025-06-25 PROCEDURE — G8427 DOCREV CUR MEDS BY ELIG CLIN: HCPCS

## 2025-06-25 PROCEDURE — G8417 CALC BMI ABV UP PARAM F/U: HCPCS

## 2025-06-25 PROCEDURE — 3017F COLORECTAL CA SCREEN DOC REV: CPT

## 2025-06-25 NOTE — PROGRESS NOTES
Endoscopy, colon, diagnostic (08/09/2022); Colonoscopy (08/23/2022); Knee arthroscopy (Left, 07/20/2021); Foot surgery (Right, 04/2022); Cystoscopy (Bilateral, 09/27/2022); hernia repair (10/2023); Ureter surgery (Left, 07/03/2023); and joint replacement.    Family History  This patient's family history includes Arthritis in her mother; Birth Defects in her brother and father; Breast Cancer (age of onset: 56) in her maternal cousin; Breast Cancer (age of onset: 83) in her mother; Diabetes in her maternal aunt; High Blood Pressure in her mother.    Social History  Sofya  reports that she has never smoked. She has never used smokeless tobacco. She reports current alcohol use. She reports that she does not use drugs.    Subjective:   REVIEW OF SYSTEMS:  Constitutional: negative  Eyes: negative  Respiratory: negative  Cardiovascular: negative  Gastrointestinal: negative  Musculoskeletal: negative  Genitourinary: negative except for what is in HPI  Skin: negative   Neurological: negative  Hematological/Lymphatic: negative  Psychological: negative    Objective:     PE:   Vitals:    06/25/25 1143   Resp: 20   Weight: 88 kg (194 lb)   Height: 1.651 m (5' 5\")       Constitutional:       No acute distress, cooperative and answering questions appropriately.  Cardiovascular:        Normal rate and regular rhythm.  Pulmonary/Chest:       Normal respiratory rate and rhthym.  No use of accessory muscles.   Abdominal:        Soft. No tenderness. Bowel sounds present.  Neurological:        Alert and oriented to person, place, time, and situation.  Psychiatric:        Normal mood and affect.  Genitourinary:       Bladder non-tender and non-distended.          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 Deferred potassium citrate.  - No new stones noted on

## 2025-09-02 ENCOUNTER — HOSPITAL ENCOUNTER (OUTPATIENT)
Dept: GENERAL RADIOLOGY | Age: 70
Discharge: HOME | End: 2025-09-02
Payer: MEDICARE

## 2025-09-02 LAB
ALBUMIN SERPL BCG-MCNC: 4.1 G/DL (ref 3.4–4.9)
ALP SERPL-CCNC: 89 U/L (ref 38–126)
ALT SERPL W/O P-5'-P-CCNC: 15 U/L (ref 10–35)
ANION GAP SERPL CALC-SCNC: 12 MEQ/L (ref 8–16)
AST SERPL-CCNC: 18 U/L (ref 10–35)
BASOPHILS ABSOLUTE: 0 THOU/MM3 (ref 0–0.1)
BASOPHILS NFR BLD AUTO: 0.5 %
BILIRUB SERPL-MCNC: 0.4 MG/DL (ref 0.3–1.2)
BUN SERPL-MCNC: 23 MG/DL (ref 8–23)
CALCIUM SERPL-MCNC: 9.6 MG/DL (ref 8.5–10.5)
CHLORIDE SERPL-SCNC: 105 MEQ/L (ref 98–111)
CO2 SERPL-SCNC: 26 MEQ/L (ref 22–29)
CREAT SERPL-MCNC: 0.9 MG/DL (ref 0.5–0.9)
DEPRECATED RDW RBC AUTO: 46.5 FL (ref 35–45)
EOSINOPHIL NFR BLD AUTO: 3.9 %
EOSINOPHILS ABSOLUTE: 0.2 THOU/MM3 (ref 0–0.4)
ERYTHROCYTE [DISTWIDTH] IN BLOOD BY AUTOMATED COUNT: 12.9 % (ref 11.5–14.5)
FERRITIN SERPL IA-MCNC: 300 NG/ML (ref 13–150)
GFR SERPL CREATININE-BSD FRML MDRD: 70 ML/MIN/1.73M2
GLUCOSE SERPL-MCNC: 99 MG/DL (ref 74–109)
HCT VFR BLD AUTO: 39.7 % (ref 37–47)
HGB BLD-MCNC: 12.4 GM/DL (ref 12–16)
IMM GRANULOCYTES # BLD AUTO: 0.02 THOU/MM3 (ref 0–0.07)
IMM GRANULOCYTES NFR BLD AUTO: 0.4 %
IRON SATN MFR SERPL: 32 % (ref 20–50)
IRON SERPL-MCNC: 74 UG/DL (ref 37–145)
LYMPHOCYTES ABSOLUTE: 1.3 THOU/MM3 (ref 1–4.8)
LYMPHOCYTES NFR BLD AUTO: 22.5 %
MCH RBC QN AUTO: 30.5 PG (ref 26–33)
MCHC RBC AUTO-ENTMCNC: 31.2 GM/DL (ref 32.2–35.5)
MCV RBC AUTO: 97.8 FL (ref 81–99)
MONOCYTES ABSOLUTE: 0.4 THOU/MM3 (ref 0.4–1.3)
MONOCYTES NFR BLD AUTO: 7.3 %
NEUTROPHILS ABSOLUTE: 3.7 THOU/MM3 (ref 1.8–7.7)
NEUTROPHILS NFR BLD AUTO: 65.4 %
NRBC BLD AUTO-RTO: 0 /100 WBC
PLATELET # BLD AUTO: 215 THOU/MM3 (ref 130–400)
PMV BLD AUTO: 10.5 FL (ref 9.4–12.4)
POTASSIUM SERPL-SCNC: 4.5 MEQ/L (ref 3.5–5.2)
PROT SERPL-MCNC: 6.6 G/DL (ref 6.4–8.3)
RBC # BLD AUTO: 4.06 MILL/MM3 (ref 4.2–5.4)
SODIUM SERPL-SCNC: 143 MEQ/L (ref 135–145)
TIBC SERPL-MCNC: 234 UG/DL (ref 171–450)
WBC # BLD AUTO: 5.6 THOU/MM3 (ref 4.8–10.8)

## 2025-09-02 PROCEDURE — 83540 ASSAY OF IRON: CPT

## 2025-09-02 PROCEDURE — 85025 COMPLETE CBC W/AUTO DIFF WBC: CPT

## 2025-09-02 PROCEDURE — 83550 IRON BINDING TEST: CPT

## 2025-09-02 PROCEDURE — 80053 COMPREHEN METABOLIC PANEL: CPT

## 2025-09-02 PROCEDURE — 82728 ASSAY OF FERRITIN: CPT

## 2025-09-02 PROCEDURE — 36415 COLL VENOUS BLD VENIPUNCTURE: CPT

## (undated) DEVICE — SOLUTION IRRIG 1000ML STRL H2O USP PLAS POUR BTL

## (undated) DEVICE — CONE TIP URETERAL CATHETER WITH OPEN-END: Brand: CONE TIP

## (undated) DEVICE — Device

## (undated) DEVICE — SOLUTION IRRIG 3000ML 0.9% SOD CHL USP UROMATIC PLAS CONT

## (undated) DEVICE — CYSTO: Brand: MEDLINE INDUSTRIES, INC.

## (undated) DEVICE — DUAL LUMEN URETERAL CATHETER

## (undated) DEVICE — GUIDEWIRE URO L150CM DIA0.035IN STIFF NIT HYDRPHLC STR TIP

## (undated) DEVICE — ADAPTER URO SCP UROLOK LL

## (undated) DEVICE — SOLUTION IV IRRIG WATER 1000ML POUR BRL 2F7114

## (undated) DEVICE — CYSTO PACK: Brand: MEDLINE INDUSTRIES, INC.

## (undated) DEVICE — SINGLE-USE DIGITAL FLEXIBLE URETEROSCOPE: Brand: LITHOVUE

## (undated) DEVICE — SYSTEM PMP SGL ACT W/ 10CC VAC SYR 1 W VLV FOR ENDOSCP SURG

## (undated) DEVICE — SOLUTION SCRB 4OZ 4% CHG H2O AIDED FOR PREOPERATIVE SKIN

## (undated) DEVICE — GUIDEWIRE URO L150CM DIA .035IN STIFF NIT HYDRPHL STR TIP